# Patient Record
Sex: FEMALE | Race: OTHER | Employment: UNEMPLOYED | URBAN - METROPOLITAN AREA
[De-identification: names, ages, dates, MRNs, and addresses within clinical notes are randomized per-mention and may not be internally consistent; named-entity substitution may affect disease eponyms.]

---

## 2022-06-06 ENCOUNTER — OFFICE VISIT (OUTPATIENT)
Dept: ORTHOPEDIC SURGERY | Age: 37
End: 2022-06-06
Payer: COMMERCIAL

## 2022-06-06 VITALS — BODY MASS INDEX: 26.06 KG/M2 | WEIGHT: 166 LBS | HEIGHT: 67 IN

## 2022-06-06 DIAGNOSIS — S46.011A TRAUMATIC COMPLETE TEAR OF RIGHT ROTATOR CUFF, INITIAL ENCOUNTER: Primary | ICD-10-CM

## 2022-06-06 DIAGNOSIS — M19.011 DEGENERATIVE JOINT DISEASE OF RIGHT ACROMIOCLAVICULAR JOINT: ICD-10-CM

## 2022-06-06 DIAGNOSIS — S46.111A STRAIN OF MUSCLE, FASCIA AND TENDON OF LONG HEAD OF BICEPS, RIGHT ARM, INITIAL ENCOUNTER: ICD-10-CM

## 2022-06-06 PROCEDURE — 99204 OFFICE O/P NEW MOD 45 MIN: CPT | Performed by: ORTHOPAEDIC SURGERY

## 2022-06-06 RX ORDER — ALPRAZOLAM 0.5 MG/1
0.5 TABLET ORAL NIGHTLY PRN
COMMUNITY

## 2022-06-06 RX ORDER — ORPHENADRINE CITRATE 100 MG/1
100 TABLET, EXTENDED RELEASE ORAL 2 TIMES DAILY
COMMUNITY

## 2022-06-06 RX ORDER — OMEPRAZOLE 40 MG/1
40 CAPSULE, DELAYED RELEASE ORAL DAILY
COMMUNITY
Start: 2022-03-24
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
## 2022-06-07 PROBLEM — S46.011A TRAUMATIC COMPLETE TEAR OF RIGHT ROTATOR CUFF: Status: ACTIVE | Noted: 2022-06-07

## 2022-06-07 PROBLEM — M19.011 DEGENERATIVE JOINT DISEASE OF RIGHT ACROMIOCLAVICULAR JOINT: Status: ACTIVE | Noted: 2022-06-07

## 2022-06-07 PROBLEM — S46.111A STRAIN OF MUSCLE, FASCIA AND TENDON OF LONG HEAD OF BICEPS, RIGHT ARM, INITIAL ENCOUNTER: Status: ACTIVE | Noted: 2022-06-07

## 2022-06-10 ENCOUNTER — TELEPHONE (OUTPATIENT)
Dept: ORTHOPEDIC SURGERY | Age: 37
End: 2022-06-10

## 2022-06-10 NOTE — TELEPHONE ENCOUNTER
Patient calling to say was told surgery was 6/17 however hospital is telling her 16th and she can't do that day

## 2022-06-14 RX ORDER — LISDEXAMFETAMINE DIMESYLATE 10 MG/1
CAPSULE ORAL DAILY
COMMUNITY

## 2022-06-14 RX ORDER — NAPROXEN 500 MG/1
500 TABLET ORAL 2 TIMES DAILY WITH MEALS
COMMUNITY

## 2022-06-14 RX ORDER — HYDROXYZINE HYDROCHLORIDE 25 MG/1
TABLET, FILM COATED ORAL NIGHTLY
COMMUNITY
Start: 2022-06-02

## 2022-06-14 RX ORDER — ONDANSETRON 4 MG/1
4 TABLET, ORALLY DISINTEGRATING ORAL NIGHTLY
COMMUNITY

## 2022-06-14 RX ORDER — BUTALBITAL AND ACETAMINOPHEN 25; 325 MG/1; MG/1
TABLET ORAL AS NEEDED
COMMUNITY

## 2022-06-14 RX ORDER — FLUTICASONE PROPIONATE 50 MCG
SPRAY, SUSPENSION (ML) NASAL AS NEEDED
COMMUNITY
Start: 2021-10-28

## 2022-06-14 NOTE — PROGRESS NOTES
Patient verified name and . Order for consent  found in EHR and matches case posting; patient verifies procedure. Type 1b surgery, phone assessment complete. Orders  received. Labs per surgeon: none  Labs per anesthesia protocol: none    Patient answered medical/surgical history questions at their best of ability. All prior to admission medications documented in [facility]. Patient instructed to take the following medications the day of surgery according to anesthesia guidelines with a small sip of water: use Flonase nasal spray, if needed; take:  Omeprazole, Norflex, Gabapentin On the day before surgery please take Acetaminophen 1000mg in the morning and then again before bed. You may substitute for Tylenol 650 mg. Hold all vitamins 7 days prior to surgery and NSAIDS 5 days prior to surgery. Prescription meds to hold :  Naproxen hold for 5 days prior to surgery  Patient instructed on the following:    > Arrive at A Entrance, time of arrival to be called the day before by 1700  > NPO after midnight including gum, mints, and ice chips  > Responsible adult must drive patient to the hospital, stay during surgery, and patient will need supervision 24 hours after anesthesia  > Use antibacterial soap in shower the night before surgery and on the morning of surgery  > All piercings must be removed prior to arrival.    > Leave all valuables (money and jewelry) at home but bring insurance card and ID on DOS.   > You may be required to pay a deductible or co-pay on the day of your procedure. You can pre-pay by calling [phone] if your surgery is at the [facility] or [phone] if your surgery is at the [facility]. > Do not wear make-up, nail polish, lotions, cologne, perfumes, powders, or oil on skin. Artificial nails are not permitted.
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
## 2022-06-17 ENCOUNTER — PREP FOR PROCEDURE (OUTPATIENT)
Dept: ORTHOPEDIC SURGERY | Age: 37
End: 2022-06-17

## 2022-06-17 ENCOUNTER — APPOINTMENT (OUTPATIENT)
Dept: GENERAL RADIOLOGY | Age: 37
End: 2022-06-17
Attending: ORTHOPAEDIC SURGERY
Payer: COMMERCIAL

## 2022-06-17 ENCOUNTER — HOSPITAL ENCOUNTER (OUTPATIENT)
Age: 37
Setting detail: OUTPATIENT SURGERY
Discharge: HOME | End: 2022-06-17
Attending: ORTHOPAEDIC SURGERY | Admitting: ORTHOPAEDIC SURGERY
Payer: COMMERCIAL

## 2022-06-17 ENCOUNTER — ANESTHESIA (OUTPATIENT)
Dept: SURGERY | Age: 37
End: 2022-06-17
Payer: COMMERCIAL

## 2022-06-17 ENCOUNTER — ANESTHESIA EVENT (OUTPATIENT)
Dept: SURGERY | Age: 37
End: 2022-06-17
Payer: COMMERCIAL

## 2022-06-17 VITALS
WEIGHT: 164 LBS | BODY MASS INDEX: 25.74 KG/M2 | HEIGHT: 67 IN | OXYGEN SATURATION: 94 % | SYSTOLIC BLOOD PRESSURE: 119 MMHG | DIASTOLIC BLOOD PRESSURE: 68 MMHG | RESPIRATION RATE: 18 BRPM | HEART RATE: 75 BPM | TEMPERATURE: 97.8 F

## 2022-06-17 DIAGNOSIS — S46.011D TRAUMATIC COMPLETE TEAR OF RIGHT ROTATOR CUFF, SUBSEQUENT ENCOUNTER: Primary | ICD-10-CM

## 2022-06-17 DIAGNOSIS — S46.111A STRAIN OF MUSCLE, FASCIA AND TENDON OF LONG HEAD OF BICEPS, RIGHT ARM, INITIAL ENCOUNTER: ICD-10-CM

## 2022-06-17 DIAGNOSIS — S46.011A TRAUMATIC COMPLETE TEAR OF RIGHT ROTATOR CUFF, INITIAL ENCOUNTER: Primary | ICD-10-CM

## 2022-06-17 DIAGNOSIS — M19.011 DEGENERATIVE JOINT DISEASE OF RIGHT ACROMIOCLAVICULAR JOINT: ICD-10-CM

## 2022-06-17 PROBLEM — S43.431A SUPERIOR GLENOID LABRUM LESION OF RIGHT SHOULDER: Status: ACTIVE | Noted: 2022-06-17

## 2022-06-17 LAB
EST. AVERAGE GLUCOSE BLD GHB EST-MCNC: 108 MG/DL
GLUCOSE BLD STRIP.AUTO-MCNC: 100 MG/DL (ref 65–100)
HBA1C MFR BLD: 5.4 % (ref 4.2–6.3)
HCG UR QL: NEGATIVE
SERVICE CMNT-IMP: NORMAL

## 2022-06-17 PROCEDURE — 7100000000 HC PACU RECOVERY - FIRST 15 MIN: Performed by: ORTHOPAEDIC SURGERY

## 2022-06-17 PROCEDURE — 23412 REPAIR ROTATOR CUFF CHRONIC: CPT | Performed by: ORTHOPAEDIC SURGERY

## 2022-06-17 PROCEDURE — 29823 SHO ARTHRS SRG XTNSV DBRDMT: CPT | Performed by: ORTHOPAEDIC SURGERY

## 2022-06-17 PROCEDURE — 83036 HEMOGLOBIN GLYCOSYLATED A1C: CPT

## 2022-06-17 PROCEDURE — 82962 GLUCOSE BLOOD TEST: CPT

## 2022-06-17 PROCEDURE — 3700000000 HC ANESTHESIA ATTENDED CARE: Performed by: ORTHOPAEDIC SURGERY

## 2022-06-17 PROCEDURE — 2500000003 HC RX 250 WO HCPCS: Performed by: ORTHOPAEDIC SURGERY

## 2022-06-17 PROCEDURE — 3600000014 HC SURGERY LEVEL 4 ADDTL 15MIN: Performed by: ORTHOPAEDIC SURGERY

## 2022-06-17 PROCEDURE — 7100000001 HC PACU RECOVERY - ADDTL 15 MIN: Performed by: ORTHOPAEDIC SURGERY

## 2022-06-17 PROCEDURE — 3600000004 HC SURGERY LEVEL 4 BASE: Performed by: ORTHOPAEDIC SURGERY

## 2022-06-17 PROCEDURE — 2709999900 HC NON-CHARGEABLE SUPPLY: Performed by: ORTHOPAEDIC SURGERY

## 2022-06-17 PROCEDURE — 2500000003 HC RX 250 WO HCPCS: Performed by: NURSE ANESTHETIST, CERTIFIED REGISTERED

## 2022-06-17 PROCEDURE — 2580000003 HC RX 258: Performed by: ANESTHESIOLOGY

## 2022-06-17 PROCEDURE — 3700000001 HC ADD 15 MINUTES (ANESTHESIA): Performed by: ORTHOPAEDIC SURGERY

## 2022-06-17 PROCEDURE — 23430 REPAIR BICEPS TENDON: CPT | Performed by: ORTHOPAEDIC SURGERY

## 2022-06-17 PROCEDURE — 6360000002 HC RX W HCPCS: Performed by: NURSE ANESTHETIST, CERTIFIED REGISTERED

## 2022-06-17 PROCEDURE — 6360000002 HC RX W HCPCS: Performed by: ORTHOPAEDIC SURGERY

## 2022-06-17 PROCEDURE — 29824 SHO ARTHRS SRG DSTL CLAVICLC: CPT | Performed by: ORTHOPAEDIC SURGERY

## 2022-06-17 PROCEDURE — 81025 URINE PREGNANCY TEST: CPT

## 2022-06-17 PROCEDURE — C1713 ANCHOR/SCREW BN/BN,TIS/BN: HCPCS | Performed by: ORTHOPAEDIC SURGERY

## 2022-06-17 PROCEDURE — 6360000002 HC RX W HCPCS: Performed by: ANESTHESIOLOGY

## 2022-06-17 PROCEDURE — 7100000010 HC PHASE II RECOVERY - FIRST 15 MIN: Performed by: ORTHOPAEDIC SURGERY

## 2022-06-17 PROCEDURE — 6370000000 HC RX 637 (ALT 250 FOR IP): Performed by: ANESTHESIOLOGY

## 2022-06-17 PROCEDURE — 64415 NJX AA&/STRD BRCH PLXS IMG: CPT | Performed by: ANESTHESIOLOGY

## 2022-06-17 PROCEDURE — 73030 X-RAY EXAM OF SHOULDER: CPT

## 2022-06-17 DEVICE — OMEGA 4.75MM PEEK KNOTLESS ANCHOR SYSTEM, SINGLE
Type: IMPLANTABLE DEVICE | Site: SHOULDER | Status: FUNCTIONAL
Brand: OMEGA

## 2022-06-17 DEVICE — ICONIX 2 NEEDLES WITH INTELLIBRAID TECHNOLOGY, 2.3MM ANCHOR WITH 2 STRANDS #2 FORCE FIBER
Type: IMPLANTABLE DEVICE | Site: SHOULDER | Status: FUNCTIONAL
Brand: ICONIX

## 2022-06-17 DEVICE — 5.5MM PEEK ZIP SUTURE ANCHOR WITH ¿ CIRCLE TAPER NEEDLES, #2 FORCE FIBER
Type: IMPLANTABLE DEVICE | Site: SHOULDER | Status: FUNCTIONAL
Brand: PEEK ZIP

## 2022-06-17 RX ORDER — SODIUM CHLORIDE 9 MG/ML
INJECTION, SOLUTION INTRAVENOUS PRN
Status: DISCONTINUED | OUTPATIENT
Start: 2022-06-17 | End: 2022-06-17

## 2022-06-17 RX ORDER — HYDROMORPHONE HYDROCHLORIDE 2 MG/1
2 TABLET ORAL EVERY 6 HOURS PRN
Qty: 40 TABLET | Refills: 0 | Status: SHIPPED | OUTPATIENT
Start: 2022-06-17 | End: 2022-06-27

## 2022-06-17 RX ORDER — SODIUM CHLORIDE 0.9 % (FLUSH) 0.9 %
5-40 SYRINGE (ML) INJECTION PRN
Status: DISCONTINUED | OUTPATIENT
Start: 2022-06-17 | End: 2022-06-17 | Stop reason: HOSPADM

## 2022-06-17 RX ORDER — KETOROLAC TROMETHAMINE 10 MG/1
TABLET, FILM COATED ORAL
Qty: 20 TABLET | Refills: 0 | Status: SHIPPED | OUTPATIENT
Start: 2022-06-17

## 2022-06-17 RX ORDER — EPHEDRINE SULFATE/0.9% NACL/PF 50 MG/5 ML
SYRINGE (ML) INTRAVENOUS PRN
Status: DISCONTINUED | OUTPATIENT
Start: 2022-06-17 | End: 2022-06-17 | Stop reason: SDUPTHER

## 2022-06-17 RX ORDER — LIDOCAINE HYDROCHLORIDE AND EPINEPHRINE 10; 10 MG/ML; UG/ML
INJECTION, SOLUTION INFILTRATION; PERINEURAL PRN
Status: DISCONTINUED | OUTPATIENT
Start: 2022-06-17 | End: 2022-06-17 | Stop reason: ALTCHOICE

## 2022-06-17 RX ORDER — ROPIVACAINE HYDROCHLORIDE 5 MG/ML
INJECTION, SOLUTION EPIDURAL; INFILTRATION; PERINEURAL
Status: COMPLETED | OUTPATIENT
Start: 2022-06-17 | End: 2022-06-17

## 2022-06-17 RX ORDER — MIDAZOLAM HYDROCHLORIDE 2 MG/2ML
2 INJECTION, SOLUTION INTRAMUSCULAR; INTRAVENOUS
Status: COMPLETED | OUTPATIENT
Start: 2022-06-17 | End: 2022-06-17

## 2022-06-17 RX ORDER — SODIUM CHLORIDE 9 MG/ML
INJECTION, SOLUTION INTRAVENOUS PRN
Status: DISCONTINUED | OUTPATIENT
Start: 2022-06-17 | End: 2022-06-17 | Stop reason: HOSPADM

## 2022-06-17 RX ORDER — LIDOCAINE HYDROCHLORIDE 20 MG/ML
INJECTION, SOLUTION EPIDURAL; INFILTRATION; INTRACAUDAL; PERINEURAL PRN
Status: DISCONTINUED | OUTPATIENT
Start: 2022-06-17 | End: 2022-06-17 | Stop reason: SDUPTHER

## 2022-06-17 RX ORDER — NEOSTIGMINE METHYLSULFATE 1 MG/ML
INJECTION, SOLUTION INTRAVENOUS PRN
Status: DISCONTINUED | OUTPATIENT
Start: 2022-06-17 | End: 2022-06-17 | Stop reason: SDUPTHER

## 2022-06-17 RX ORDER — DEXAMETHASONE SODIUM PHOSPHATE 10 MG/ML
INJECTION INTRAMUSCULAR; INTRAVENOUS PRN
Status: DISCONTINUED | OUTPATIENT
Start: 2022-06-17 | End: 2022-06-17 | Stop reason: SDUPTHER

## 2022-06-17 RX ORDER — FENTANYL CITRATE 50 UG/ML
100 INJECTION, SOLUTION INTRAMUSCULAR; INTRAVENOUS
Status: COMPLETED | OUTPATIENT
Start: 2022-06-17 | End: 2022-06-17

## 2022-06-17 RX ORDER — HYDROMORPHONE HYDROCHLORIDE 1 MG/ML
0.5 INJECTION, SOLUTION INTRAMUSCULAR; INTRAVENOUS; SUBCUTANEOUS EVERY 5 MIN PRN
Status: DISCONTINUED | OUTPATIENT
Start: 2022-06-17 | End: 2022-06-17 | Stop reason: HOSPADM

## 2022-06-17 RX ORDER — SODIUM CHLORIDE 0.9 % (FLUSH) 0.9 %
5-40 SYRINGE (ML) INJECTION PRN
Status: CANCELLED | OUTPATIENT
Start: 2022-06-17

## 2022-06-17 RX ORDER — ONDANSETRON 2 MG/ML
INJECTION INTRAMUSCULAR; INTRAVENOUS PRN
Status: DISCONTINUED | OUTPATIENT
Start: 2022-06-17 | End: 2022-06-17 | Stop reason: SDUPTHER

## 2022-06-17 RX ORDER — SODIUM CHLORIDE, SODIUM LACTATE, POTASSIUM CHLORIDE, CALCIUM CHLORIDE 600; 310; 30; 20 MG/100ML; MG/100ML; MG/100ML; MG/100ML
INJECTION, SOLUTION INTRAVENOUS CONTINUOUS
Status: DISCONTINUED | OUTPATIENT
Start: 2022-06-17 | End: 2022-06-17 | Stop reason: HOSPADM

## 2022-06-17 RX ORDER — SODIUM CHLORIDE 0.9 % (FLUSH) 0.9 %
5-40 SYRINGE (ML) INJECTION EVERY 12 HOURS SCHEDULED
Status: DISCONTINUED | OUTPATIENT
Start: 2022-06-17 | End: 2022-06-17

## 2022-06-17 RX ORDER — ACETAMINOPHEN 500 MG
1000 TABLET ORAL ONCE
Status: COMPLETED | OUTPATIENT
Start: 2022-06-17 | End: 2022-06-17

## 2022-06-17 RX ORDER — SODIUM CHLORIDE 0.9 % (FLUSH) 0.9 %
5-40 SYRINGE (ML) INJECTION EVERY 12 HOURS SCHEDULED
Status: CANCELLED | OUTPATIENT
Start: 2022-06-17

## 2022-06-17 RX ORDER — PROCHLORPERAZINE EDISYLATE 5 MG/ML
5 INJECTION INTRAMUSCULAR; INTRAVENOUS
Status: DISCONTINUED | OUTPATIENT
Start: 2022-06-17 | End: 2022-06-17 | Stop reason: HOSPADM

## 2022-06-17 RX ORDER — OXYCODONE HYDROCHLORIDE 5 MG/1
5 TABLET ORAL
Status: DISCONTINUED | OUTPATIENT
Start: 2022-06-17 | End: 2022-06-17 | Stop reason: HOSPADM

## 2022-06-17 RX ORDER — SODIUM CHLORIDE 0.9 % (FLUSH) 0.9 %
5-40 SYRINGE (ML) INJECTION EVERY 12 HOURS SCHEDULED
Status: DISCONTINUED | OUTPATIENT
Start: 2022-06-17 | End: 2022-06-17 | Stop reason: HOSPADM

## 2022-06-17 RX ORDER — IPRATROPIUM BROMIDE AND ALBUTEROL SULFATE 2.5; .5 MG/3ML; MG/3ML
1 SOLUTION RESPIRATORY (INHALATION)
Status: DISCONTINUED | OUTPATIENT
Start: 2022-06-17 | End: 2022-06-17 | Stop reason: HOSPADM

## 2022-06-17 RX ORDER — SODIUM CHLORIDE 9 MG/ML
INJECTION, SOLUTION INTRAVENOUS PRN
Status: CANCELLED | OUTPATIENT
Start: 2022-06-17

## 2022-06-17 RX ORDER — PROPOFOL 10 MG/ML
INJECTION, EMULSION INTRAVENOUS PRN
Status: DISCONTINUED | OUTPATIENT
Start: 2022-06-17 | End: 2022-06-17 | Stop reason: SDUPTHER

## 2022-06-17 RX ORDER — HALOPERIDOL 5 MG/ML
1 INJECTION INTRAMUSCULAR
Status: DISCONTINUED | OUTPATIENT
Start: 2022-06-17 | End: 2022-06-17 | Stop reason: HOSPADM

## 2022-06-17 RX ORDER — GLYCOPYRROLATE 0.2 MG/ML
INJECTION INTRAMUSCULAR; INTRAVENOUS PRN
Status: DISCONTINUED | OUTPATIENT
Start: 2022-06-17 | End: 2022-06-17 | Stop reason: SDUPTHER

## 2022-06-17 RX ORDER — SODIUM CHLORIDE 0.9 % (FLUSH) 0.9 %
5-40 SYRINGE (ML) INJECTION PRN
Status: DISCONTINUED | OUTPATIENT
Start: 2022-06-17 | End: 2022-06-17

## 2022-06-17 RX ORDER — PROMETHAZINE HYDROCHLORIDE 25 MG/1
25 TABLET ORAL EVERY 6 HOURS PRN
Qty: 20 TABLET | Refills: 0 | Status: SHIPPED | OUTPATIENT
Start: 2022-06-17 | End: 2022-06-22

## 2022-06-17 RX ORDER — SCOLOPAMINE TRANSDERMAL SYSTEM 1 MG/1
1 PATCH, EXTENDED RELEASE TRANSDERMAL ONCE
Status: CANCELLED | OUTPATIENT
Start: 2022-06-17

## 2022-06-17 RX ORDER — ROCURONIUM BROMIDE 10 MG/ML
INJECTION, SOLUTION INTRAVENOUS PRN
Status: DISCONTINUED | OUTPATIENT
Start: 2022-06-17 | End: 2022-06-17 | Stop reason: SDUPTHER

## 2022-06-17 RX ADMIN — SODIUM CHLORIDE, SODIUM LACTATE, POTASSIUM CHLORIDE, AND CALCIUM CHLORIDE: 600; 310; 30; 20 INJECTION, SOLUTION INTRAVENOUS at 07:45

## 2022-06-17 RX ADMIN — PROPOFOL 150 MG: 10 INJECTION, EMULSION INTRAVENOUS at 09:07

## 2022-06-17 RX ADMIN — SODIUM CHLORIDE, SODIUM LACTATE, POTASSIUM CHLORIDE, AND CALCIUM CHLORIDE: 600; 310; 30; 20 INJECTION, SOLUTION INTRAVENOUS at 08:57

## 2022-06-17 RX ADMIN — ONDANSETRON 4 MG: 2 INJECTION INTRAMUSCULAR; INTRAVENOUS at 09:15

## 2022-06-17 RX ADMIN — MIDAZOLAM 2 MG: 1 INJECTION, SOLUTION INTRAMUSCULAR; INTRAVENOUS at 08:55

## 2022-06-17 RX ADMIN — Medication 3 MG: at 10:15

## 2022-06-17 RX ADMIN — ROCURONIUM BROMIDE 40 MG: 50 INJECTION, SOLUTION INTRAVENOUS at 09:07

## 2022-06-17 RX ADMIN — Medication 5 MG: at 09:21

## 2022-06-17 RX ADMIN — ACETAMINOPHEN 1000 MG: 500 TABLET, FILM COATED ORAL at 07:44

## 2022-06-17 RX ADMIN — DEXAMETHASONE SODIUM PHOSPHATE 8 MG: 10 INJECTION INTRAMUSCULAR; INTRAVENOUS at 09:15

## 2022-06-17 RX ADMIN — ROPIVACAINE HYDROCHLORIDE 30 ML: 5 INJECTION, SOLUTION EPIDURAL; INFILTRATION; PERINEURAL at 08:53

## 2022-06-17 RX ADMIN — ROCURONIUM BROMIDE 40 MG: 50 INJECTION, SOLUTION INTRAVENOUS at 09:08

## 2022-06-17 RX ADMIN — LIDOCAINE HYDROCHLORIDE 60 MG: 20 INJECTION, SOLUTION EPIDURAL; INFILTRATION; INTRACAUDAL; PERINEURAL at 09:07

## 2022-06-17 RX ADMIN — Medication 2 MG: at 10:16

## 2022-06-17 RX ADMIN — FENTANYL CITRATE 100 MCG: 50 INJECTION INTRAMUSCULAR; INTRAVENOUS at 08:55

## 2022-06-17 RX ADMIN — GLYCOPYRROLATE 0.2 MG: 0.2 INJECTION, SOLUTION INTRAMUSCULAR; INTRAVENOUS at 10:16

## 2022-06-17 RX ADMIN — Medication 2000 MG: at 09:05

## 2022-06-17 RX ADMIN — GLYCOPYRROLATE 0.4 MG: 0.2 INJECTION, SOLUTION INTRAMUSCULAR; INTRAVENOUS at 10:15

## 2022-06-17 ASSESSMENT — PAIN SCALES - GENERAL
PAINLEVEL_OUTOF10: 0
PAINLEVEL_OUTOF10: 0

## 2022-06-17 NOTE — ANESTHESIA PROCEDURE NOTES
Peripheral Block    Patient location during procedure: pre-op  Reason for block: post-op pain management and at surgeon's request  Start time: 6/17/2022 8:53 AM  End time: 6/17/2022 8:57 AM  Staffing  Performed: anesthesiologist   Anesthesiologist: Lord Antionette MD  Preanesthetic Checklist  Completed: patient identified, IV checked, site marked, risks and benefits discussed, surgical/procedural consents, equipment checked, pre-op evaluation, timeout performed, anesthesia consent given, oxygen available and monitors applied/VS acknowledged  Peripheral Block   Patient position: supine  Prep: ChloraPrep  Provider prep: mask  Patient monitoring: cardiac monitor, continuous pulse ox, frequent blood pressure checks, IV access and oxygen  Block type: Brachial plexus  Interscalene  Laterality: right  Injection technique: single-shot  Guidance: ultrasound guided  Local infiltration: decadron (4 mg adjuvant; and epi 150 mcg)  Local infiltration: decadron (4 mg adjuvant; and epi 150 mcg)    Needle   Needle type: insulated echogenic nerve stimulator needle   Needle gauge: 21 G  Needle localization: ultrasound guidance  Needle length: 10 cm  Assessment   Injection assessment: negative aspiration for heme, no paresthesia on injection, local visualized surrounding nerve on ultrasound and no intravascular symptoms  Slow fractionated injection: yes  Hemodynamics: stable  Real-time US image taken/store: yes  Outcomes: uncomplicated    Medications Administered  Ropivacaine (NAROPIN) injection 0.5%, 30 mL

## 2022-06-17 NOTE — ANESTHESIA POSTPROCEDURE EVALUATION
Department of Anesthesiology  Postprocedure Note    Patient: Marcello Randle  MRN: [id number]  YOB: 1985  Date of evaluation: 6/17/2022  Time:  10:46 AM     Procedure Summary     Date: 06/17/22 Room / Location: [facility] 06 / [facility]    Anesthesia Start: 0902 Anesthesia Stop: 1024    Procedure: ARTHROSCOPY RIGHT SHOULDER ARTHROSCOPIC SUBACROMIAL DECOMPRESSION, DISTAL CLAVICLE RESECTION, EXTENSIVE DEBRIDEMENT SLAP TEAR, BICEPS LABRAL COMPLEX, GLENOHUMERAL JOINT CAPSULE, SUBACROMIAL SPACE, MINI OPEN ROTATOR CUFF REPAIR, BICEPS TENODESIS (Right Shoulder) Diagnosis:       Traumatic complete tear of right rotator cuff, initial encounter      Strain of muscle, fascia and tendon of long head of biceps, right arm, initial encounter      Degenerative joint disease of right acromioclavicular joint      (Traumatic complete tear of right rotator cuff, initial encounter [S46.011A])    Surgeons: Mir Rosenbaum MD Responsible Provider: London Arce MD    Anesthesia Type: general ASA Status: 2          Anesthesia Type: No value filed. Arsenio Phase I: Arsenio Score: 8    Arsenio Phase II:      Last vitals: Reviewed and per EMR flowsheets.        Anesthesia Post Evaluation    Patient location during evaluation: PACU  Patient participation: complete - patient participated  Level of consciousness: awake  Airway patency: patent  Nausea & Vomiting: no nausea  Complications: no  Cardiovascular status: hemodynamically stable  Respiratory status: acceptable and nonlabored ventilation  Hydration status: stable  Multimodal analgesia pain management approach
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
## 2022-06-17 NOTE — H&P
Update History & Physical    The patient's History and Physical of June 6, 2022 was reviewed with the patient and I examined the patient. There was no change. The surgical site was confirmed by the patient and me. Plan: The risks, benefits, expected outcome, and alternative to the recommended procedure have been discussed with the patient. Patient understands and wants to proceed with the procedure.      Electronically signed by Isak Moore MD on 6/17/2022 at 7:04 AM

## 2022-06-17 NOTE — BRIEF OP NOTE
BRIEF OPERATIVE NOTE    Date of Procedure: 6/17/2022    Preoperative Diagnosis:  ROTATOR CUFF TEAR RIGHT SHOULDER      BICEPITAL STRAIN RIGHT SHOULDER      AC OA RIGHT SHOULDER    Postoperative Diagnosis:  SAME      SLAP TEAR RIGHT SHOULDER    Procedure(s):  ARTHROSCOPY RIGHT SHOULDER ARTHROSCOPIC SUBACROMIAL DECOMPRESSION, DISTAL CLAVICLE RESECTION, EXTENSIVE DEBRIDEMENT SLAP TEAR, BICEPS LABRAL COMPLEX, GLENOHUMERAL JOINT CAPSULE, SUBACROMIAL SPACE, MINI OPEN ROTATOR CUFF REPAIR, BICEPS TENODESIS    Surgeon(s) and Role:     * Chava Beckford MD - Primary         Assistant Staff:  NONE    Surgical Staff:  Circulator: Dhaval Maldonado RN  Surgical Assistant: Tiffanie Bonner  Scrub Person First: Mic Santana  Scrub Person Second: Hyacinth Varela      * Missing procedure start or end time(s) *    Anesthesia:  GENERAL WITH INTERSCALENE BLOCK    Estimated Blood Loss: 30 CC. Complications: NONE    Implants:   Implant Name Type Inv. Item Serial No.  Lot No. LRB No. Used Action   ANCHOR SUTURE SINGLE 4.75 MM PEEK KNOTLESS SYS OMEGA - [id number]  ANCHOR SUTURE SINGLE 4.75 MM PEEK KNOTLESS SYS OMEGA  [employer] [id number] Right 2 Implanted   ANCHOR SUT DIA5. 5MM PEEK ZIP W/ NDL - [id number]  ANCHOR SUT DIA5. 5MM PEEK ZIP W/ NDL  [employer] [id number] Right 4 Implanted   ANCHOR SUT DIA2.3MM W/ NDL 2 STRND NO2 FOR FBR - [id number]  ANCHOR SUT DIA2.3MM W/ NDL 2 STRND NO2 FORC FBR  [employer] [id number] Right 1 Implanted       Gloria Roberts MD

## 2022-06-23 ENCOUNTER — TELEPHONE (OUTPATIENT)
Dept: ORTHOPEDIC SURGERY | Age: 37
End: 2022-06-23

## 2022-06-23 NOTE — TELEPHONE ENCOUNTER
Her mom is calling regarding the surgery she had last Friday on her right shoulder. She would like a call back to discuss some questions. Pt out and pleasant on interaction with staff and peers  No irritability noted  Encouraged to wear own clothing  Attending groups

## 2022-06-23 NOTE — TELEPHONE ENCOUNTER
Harijt Leonard  1985  female     Patient is 6 days post op Right shoulder arthroscopy for RTC repair. History of DVT. Reports swelling bilateral lower extremities with leg pain. Advised to go to ED to have doppler ultrasound to eval for DVT. Neva Hurley PA-C  06/23/22      Elements of this note were created using speech recognition software. As such, errors of speech recognition may be present.

## 2022-06-24 NOTE — TELEPHONE ENCOUNTER
Called and spoke with pt who states that she went to ER in [facility] last night where they performed blood work. She was told that swelling was likely from fluid retention due to sodium levels and was sent home with Lasix for 5 days and told to follow up with her primary care. Pt states she has not picked up the Lasix yet. After speaking with LBF, I advised pt to  Lasix and begin taking it as prescribed. I advised her to call her PCP to set up a follow up appt. I also advised her to closely watch the swelling in her legs and if it seems to get worse or has not gotten better with Lasix to go back to ER for further evaluation. Pt agreed and will update us if anything happens.

## 2022-06-28 ENCOUNTER — OFFICE VISIT (OUTPATIENT)
Dept: ORTHOPEDIC SURGERY | Age: 37
End: 2022-06-28

## 2022-06-28 DIAGNOSIS — Z48.89 ENCOUNTER FOR POSTOPERATIVE CARE: Primary | ICD-10-CM

## 2022-06-28 DIAGNOSIS — S46.011A TRAUMATIC COMPLETE TEAR OF RIGHT ROTATOR CUFF, INITIAL ENCOUNTER: Primary | ICD-10-CM

## 2022-06-28 RX ORDER — HYDROMORPHONE HYDROCHLORIDE 2 MG/1
2 TABLET ORAL EVERY 6 HOURS PRN
Qty: 20 TABLET | Refills: 0 | Status: SHIPPED | OUTPATIENT
Start: 2022-06-28 | End: 2022-07-03

## 2022-06-28 NOTE — PROGRESS NOTES
Today: 22    Name: Harjit Leonard  : 1985  MRN: [id number]    Patient comes in today for her post-op appointment. She is 11 days status post arthroscopy of right shoulder, arthroscopic subacromial decompression, arthroscopic distal clavicle resection, extensive debridement of SLAP tear, biceps labral complex, glenohumeral joint, subacromial space, mini open rotator cuff repair and biceps tenodesis. Bandage was removed from the right shoulder, incisions healing nicely. Sutures were removed, incisions intact. Patient did feel a little nauseous after sutures were removed. Patient laid down, ice water given. After a few minutes patient felt better and was sitting upright. Patient was given post op wound instructions. Patient stated the sling has been a security blanket for her, encouraged her to be out of the sling as much as possible at home and to wear the sling when out in the community. Yamile Aguirre NP will send in a refill of Dilaudid for the patient. Patient will get started in outpatient physical therapy working on passive motion per Dr. Meena Fried protocol. She will return in 2 weeks. Patient instructed to call our office with any additional questions or needs. Patient was only seen by Meenakshi Lovelace CST to maintain post operative protocol, all orders for this visit were received verbally by Dr. Stanley Rachel prior to appointment.

## 2022-06-28 NOTE — LETTER
6/28/2022     Name: Marino Szymanski    Patient is unable to work until next appointment    Work Restrictions include:     Duration: 2 weeks    Ashok Cartagena.  Jesu Go MD        Electronically Signed

## 2022-06-29 ENCOUNTER — HOSPITAL ENCOUNTER (OUTPATIENT)
Dept: PHYSICAL THERAPY | Age: 37
Setting detail: RECURRING SERIES
Discharge: HOME | End: 2022-07-02
Payer: COMMERCIAL

## 2022-06-29 PROCEDURE — 97110 THERAPEUTIC EXERCISES: CPT

## 2022-06-29 PROCEDURE — 97162 PT EVAL MOD COMPLEX 30 MIN: CPT

## 2022-06-29 ASSESSMENT — PAIN SCALES - GENERAL: PAINLEVEL_OUTOF10: 6

## 2022-06-29 NOTE — PROGRESS NOTES
Em Patteneger  : 1985  Primary: Austin Aneta Allison  Secondary:  [address] [address]  Phone: [phone]  Fax: [phone] Plan Frequency: 2x/week (x2 weeks initially with plan to follow 12 weeks)    Plan of Care/Certification Expiration Date: 22      PT Visit Info: Total # of Visits to Date: 1      OUTPATIENT PHYSICAL THERAPY:OP NOTE TYPE: Treatment Note 2022       Episode  }Appt Desk       Treatment Diagnosis:  Pain in Right Shoulder (M25.511)  Stiffness of Right Shoulder, Not elsewhere classified (M25.611)   Strain of muscle(s) and tendon(s) of the rotator cuff of right shoulder, sequela (S46.011S)  Medical/Referring Diagnosis: s/p  Arthroscopy of right shoulder, arthroscopic subacromial decompression, arthroscopic distal clavicle resection, extensive debridement of SLAP tear, biceps labral complex, glenohumeral joint, subacromial space, mini-open rotator cuff repair and biceps tenodesis. Strain of muscle(s) and tendon(s) of the rotator cuff of right shoulder, initial encounter [S46.011A]  Referring Physician:  Elizabeth Zazueta MD MD Orders:  Eval & treat, home exercise program and passive motion; 2x/week x2 weeks. (22)  Date of Onset:  Onset Date: 22 (surgery; 22 injury)  Allergies:   Duloxetine and Erythromycin  Restrictions/Precautions: Post op restrictions and precautions R shoulder. Interventions Planned (Treatment may consist of any combination of the following):    Current Treatment Recommendations: Strengthening; ROM; Manual Therapy - Soft Tissue Mobilization; Manual Therapy - Joint Manipulation; Pain management; Home exercise program; Modalities     Subjective Comments:    Initial:}    6/10  Post Session:        /10  Medications Last Reviewed:  2022  Updated Objective Findings:    See evaluation note from today  Treatment   THERAPEUTIC EXERCISE: (15 minutes):  PROM R shoulder while in supine
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
## 2022-07-01 ENCOUNTER — HOSPITAL ENCOUNTER (OUTPATIENT)
Dept: PHYSICAL THERAPY | Age: 37
Setting detail: RECURRING SERIES
Discharge: HOME | End: 2022-07-04

## 2022-07-01 PROCEDURE — 97110 THERAPEUTIC EXERCISES: CPT

## 2022-07-01 ASSESSMENT — PAIN SCALES - GENERAL: PAINLEVEL_OUTOF10: 5

## 2022-07-01 NOTE — PROGRESS NOTES
Stephie Barrientos  : 1985  Primary: Pelayo Omar Allison  Secondary:  [address] @ [address] [address]  Phone: [phone]  Fax: [phone] Plan Frequency: 2x/week (x2 weeks initially with plan to follow 12 weeks)    Plan of Care/Certification Expiration Date: 22      PT Visit Info: Total # of Visits to Date: 2      OUTPATIENT PHYSICAL THERAPY:OP NOTE TYPE: Treatment Note 2022       Episode  }Appt Desk       Treatment Diagnosis:  Pain in Right Shoulder (M25.511)  Stiffness of Right Shoulder, Not elsewhere classified (M25.611)   Strain of muscle(s) and tendon(s) of the rotator cuff of right shoulder, sequela (S46.011S)  Medical/Referring Diagnosis: s/p  Arthroscopy of right shoulder, arthroscopic subacromial decompression, arthroscopic distal clavicle resection, extensive debridement of SLAP tear, biceps labral complex, glenohumeral joint, subacromial space, mini-open rotator cuff repair and biceps tenodesis. Strain of muscle(s) and tendon(s) of the rotator cuff of right shoulder, initial encounter [S46.011A]  Referring Physician:  Dustin Ceballos MD MD Orders:  Eval & treat, home exercise program and passive motion; 2x/week x2 weeks. (22)  Date of Onset:  Onset Date: 22 (surgery; 22 injury)  Allergies:   Duloxetine and Erythromycin  Restrictions/Precautions: Post op restrictions and precautions R shoulder. Interventions Planned (Treatment may consist of any combination of the following):    Current Treatment Recommendations: Strengthening; ROM; Manual Therapy - Soft Tissue Mobilization; Manual Therapy - Joint Manipulation; Pain management; Home exercise program; Modalities     Subjective Comments: Pt states \"The shoulder is tight. \"  Initial:}    5/10  Post Session:        /10, no increase  Medications Last Reviewed:  2022  Updated Objective Findings:    Pt is tight and guarded during ambulation.  She is wearing the sling but states that she is weaning from the sling. Treatment   THERAPEUTIC EXERCISE: (35 minutes): PROM R shoulder while in supine for motion within protocol limits. The scapula was stabilized for all motions and pt practice diaphramatic breathing during PROM. ER, scaption and flexion to protocol limits. Pendulums x10 each  Scapula retraction in sitting x20  Chin tucks x10 seated    MODALITIES: (15 minutes, unbilable): Cold and compression for post treatment soreness to R shoulder using GameReady, low pressure, 40-deg F, x10' while in sitting UE supported. Good relief with this. HEP: Continue current HEP. Treatment/Session Summary:    · Treatment Assessment: Pt has limited ER passively. She has scaption and flexion to protocol limits but she is tight and guarded. · Communication/Consultation:  None today  · Equipment provided today:  None  · Recommendations/Intent for next treatment session: We will continue with modalities for pain modulation, initial R shoulder PROM treatment. Will review and update HEP as appropriate.      Total Treatment Billable Duration:  35 minutes   Time In: 7222  Time Out: 1040    Heather Eye, PTA       Charge Capture  }Post Session Pain  PT Visit Info  [employer] Portal  MD Guidelines  Scanned Media  Benefits  MyChart    Future Appointments   Date Time Provider Naedr Jameson   7/5/2022  2:45 PM Laurine Hoit [place], PT [facility]   7/7/2022  1:00 PM Kurt Doshi, PT [facility]   7/11/2022 11:15 AM Heather Eye, PTA [facility]   7/11/2022  1:00 PM Kay Archer MD [facility]   7/13/2022  1:00 PM Kurt Doshi, PT [facility]   7/18/2022  2:30 PM Kurt Doshi, PT [facility]   7/21/2022  2:30 PM Heather Eye, PTA [facility]   7/25/2022  2:30 PM Heather Eye, PTA [facility]   7/28/2022  2:30 PM Heather Eye, PTA [facility]   8/1/2022  2:30 PM Kurt Doshi, PT [facility]

## 2022-07-05 ENCOUNTER — HOSPITAL ENCOUNTER (OUTPATIENT)
Dept: PHYSICAL THERAPY | Age: 37
Setting detail: RECURRING SERIES
Discharge: HOME | End: 2022-07-08

## 2022-07-05 PROCEDURE — 97110 THERAPEUTIC EXERCISES: CPT

## 2022-07-05 PROCEDURE — 97140 MANUAL THERAPY 1/> REGIONS: CPT

## 2022-07-05 NOTE — PROGRESS NOTES
Tish July  : 1985  Primary: Floyd Allison  Secondary:  Rosario Beltre @ [address] [address]  Phone: [phone]  Fax: [phone] Plan Frequency: 2x/week (x2 weeks initially with plan to follow 12 weeks)    Plan of Care/Certification Expiration Date: 22      PT Visit Info: Total # of Visits to Date: 3      OUTPATIENT PHYSICAL THERAPY:OP NOTE TYPE: Treatment Note 2022       Episode  }Appt Desk       Treatment Diagnosis:  Pain in Right Shoulder (M25.511)  Stiffness of Right Shoulder, Not elsewhere classified (M25.611)   Strain of muscle(s) and tendon(s) of the rotator cuff of right shoulder, sequela (S46.011S)  Medical/Referring Diagnosis: s/p  Arthroscopy of right shoulder, arthroscopic subacromial decompression, arthroscopic distal clavicle resection, extensive debridement of SLAP tear, biceps labral complex, glenohumeral joint, subacromial space, mini-open rotator cuff repair and biceps tenodesis. Strain of muscle(s) and tendon(s) of the rotator cuff of right shoulder, initial encounter [S46.011A]  Referring Physician:  Tres Acuña MD MD Orders:  Eval & treat, home exercise program and passive motion; 2x/week x2 weeks. (22)  Date of Onset:  Onset Date: 22 (surgery; 22 injury)  Allergies:   Duloxetine and Erythromycin  Restrictions/Precautions: Post op restrictions and precautions R shoulder. Interventions Planned (Treatment may consist of any combination of the following):    Current Treatment Recommendations: Strengthening; ROM; Manual Therapy - Soft Tissue Mobilization; Manual Therapy - Joint Manipulation; Pain management; Home exercise program; Modalities     Subjective Comments: Continues have pain, but managing with her meds and ice. Has been doing the HEP and has been getting out of the sling more through day. Initial:}     No VAS.    Post Session:        /  Medications Last Reviewed: 7/5/2022  Updated Objective Findings:   ROM:  PROM RUE (degrees)  R Shoulder Flex  0-180: 90 (forward elevation)  R Shoulder ABduction 0-180: 85 (with scapula stabilized)  R Shoulder Ext Rotation  0-90: 15 (in shoulder neutral and 45 deg abd)  Treatment   MANUAL THERAPY: (15 minutes): Positional Release technique to R upper trap, pec, subscapularis, and pronator teres for muscle restrictions to motion. Grade 1-2 physiologic mobilizations to R glenohumeral distraction and long axis traction for pain modulation,   THERAPEUTIC EXERCISE: (30 minutes): Exercise for R shoulder motion with PROM x10 each and gentle physiologic shoulder motions with hold/relax stretch 30\"x3 in supine to ER in shoulder neutral and 45 deg abd/scaption, abduction, IR stabilized at 30 and 45 deg abd/scaption, and forward elevation; assisted active stretching to elbow flexors, wrist flexors and extensors, and forearm pronators, 3\"x10 each. Added prone arm hang with shoulder at 90-deg flexion over side of bed. Reviewed HEP, including supine wand ER in shoulder neutral. Verbal and manual cues for corrected alignment and movement. MODALITIES: (15 minutes, unbilable): Cold and compression for post treatment soreness to R shoulder using GameReady, low pressure, 40-deg F, x15' while in sitting UE supported. Good relief with this. HEP: Provided a written HEP at her request for the initial program already discussed. She is to continue with these. She verbalizes understanding. [employer] Portal  7/5/22: [id number]    Treatment/Session Summary:    · Treatment Assessment: She is making gains in her R shoulder PROM. Quite tender with active trigger points to the muscles addressed with the positional release. Pain dominant and stiff to the shoulder. Appears to be compliant with her HEP. Good treatment relief with the cold/compression post treatment.    · Communication/Consultation:  None today  · Equipment provided today: None  · Recommendations/Intent for next treatment session: We will continue with modalities for pain modulation, initial R shoulder PROM treatment. Will review and update HEP as appropriate.      Total Treatment Billable Duration:  45 minutes   Time In: 0093  Time Out: 2415 Rober Stearns, PT       Charge Capture  }Post Session Pain  PT Visit Info  [employer] Portal  MD Guidelines  Scanned Media  Benefits  MyChart    Future Appointments   Date Time Provider Nader Jameson   7/7/2022  1:[facility] [address]   7/11/2022 11:15 AM Roly Clifton, PTA [facility]   7/11/2022  1:00 PM Lyndsay Ferguson MD [facility]   7/13/2022  1:[facility] [address], PT [facility]   7/18/2022  2:30  [facility] [address], PT [facility]   7/21/2022  2:30 PM Roly Clifton, PTA [facility]   7/25/2022  2:30 PM Roly Clifton, PTA [facility]   7/28/2022  2:30 PM Roly Clifton, PTA [facility]   8/1/2022  2:30  [facility] [address], PT [facility]

## 2022-07-07 ENCOUNTER — HOSPITAL ENCOUNTER (OUTPATIENT)
Dept: PHYSICAL THERAPY | Age: 37
Setting detail: RECURRING SERIES
Discharge: HOME | End: 2022-07-10

## 2022-07-07 PROCEDURE — 97110 THERAPEUTIC EXERCISES: CPT

## 2022-07-07 PROCEDURE — 97140 MANUAL THERAPY 1/> REGIONS: CPT

## 2022-07-07 NOTE — PROGRESS NOTES
Thalia Gilbert  : 1985  Primary: Dom Cervantes Sc  Secondary:  [address] @ [address] [address]  Phone: [phone]  Fax: [phone] Plan Frequency: 2x/week (x2 weeks initially with plan to follow 12 weeks)    Plan of Care/Certification Expiration Date: 22      PT Visit Info: Total # of Visits to Date: 4      OUTPATIENT PHYSICAL THERAPY:OP NOTE TYPE: Treatment Note 2022       Episode  }Appt Desk       Treatment Diagnosis:  Pain in Right Shoulder (M25.511)  Stiffness of Right Shoulder, Not elsewhere classified (M25.611)   Strain of muscle(s) and tendon(s) of the rotator cuff of right shoulder, sequela (S46.011S)  Medical/Referring Diagnosis: s/p  Arthroscopy of right shoulder, arthroscopic subacromial decompression, arthroscopic distal clavicle resection, extensive debridement of SLAP tear, biceps labral complex, glenohumeral joint, subacromial space, mini-open rotator cuff repair and biceps tenodesis. Strain of muscle(s) and tendon(s) of the rotator cuff of right shoulder, initial encounter [S46.011A]  Referring Physician:  Adriana Macias MD MD Orders:  Eval & treat, home exercise program and passive motion; 2x/week x2 weeks. (22)  Date of Onset:  Onset Date: 22 (surgery; 22 injury)  Allergies:   Duloxetine and Erythromycin  Restrictions/Precautions: Post op restrictions and precautions R shoulder. Interventions Planned (Treatment may consist of any combination of the following):    Current Treatment Recommendations: Strengthening; ROM; Manual Therapy - Soft Tissue Mobilization; Manual Therapy - Joint Manipulation; Pain management; Home exercise program; Modalities     Subjective Comments: Shoulder is still painful, but doing better. Has been working on her HEP, and trying to use her R hand with ADL's while her arm is by her side. Initial:}     No VAS.    Post Session:          Medications Last Reviewed: 7/7/2022  Updated Objective Findings:  Not measured. Treatment   MANUAL THERAPY: (15 minutes): Positional Release technique to R upper trap, pec, subscapularis, infraspinatus, and pronator teres for muscle restrictions to motion. Grade 1-2 physiologic mobilizations to R glenohumeral distraction and long axis traction for pain modulation,   THERAPEUTIC EXERCISE: (25 minutes): Exercise for R shoulder motion with PROM x10 each and gentle physiologic shoulder motions with hold/relax stretch 30\"x3 in supine to ER in shoulder neutral and 45 deg abd/scaption, abduction, IR stabilized at 30 and 45 deg abd/scaption, and forward elevation; assisted active stretching to elbow flexors, wrist flexors and extensors, and forearm pronators, 3\"x10 each. MODALITIES: (15 minutes, unbilable): Cold and compression for post treatment soreness to R shoulder using GameReady, low pressure, 40-deg F, x15' while in sitting UE supported. Good relief with this. HEP: She is to continue with her existing HEP. She verbalizes understanding. [employer]  7/5/22: [id number]    Treatment/Session Summary:    · Treatment Assessment: She appears to be improving with pain and shoulder PROM. She is 3 weeks post op. · Communication/Consultation:  None today  · Equipment provided today:  None  · Recommendations/Intent for next treatment session: We will continue with modalities for pain modulation, initial R shoulder PROM treatment. Will review and update HEP as appropriate.      Total Treatment Billable Duration:  40 minutes   Time In: 5285  Time Out: [address], PT       Charge Capture  }Post Session Pain  PT Visit Info  [employer]  MD Guidelines  Scanned Media  Benefits  MyChart    Future Appointments   Date Time Provider Nader Gisell   7/11/2022 11:15 AM Milton Chappell, PTA [facility]   7/11/2022  1:00 PM Leron Mohs., MD [facility] AMB   7/13/2022  1:00 PM Melissa Monet, PT [facility] 7/18/2022  2:30 PM Abby [place], PT [facility]   7/21/2022  2:30 PM Brenda Sicard, PTA [facility]   7/25/2022  2:30 PM Brenda Sicard, PTA [facility]   7/28/2022  2:30 PM Brenda Sicard, [place] [facility]   8/1/2022  2:30 PM Abby [place], PT [facility]

## 2022-07-11 ENCOUNTER — HOSPITAL ENCOUNTER (OUTPATIENT)
Dept: PHYSICAL THERAPY | Age: 37
Setting detail: RECURRING SERIES
Discharge: HOME | End: 2022-07-14

## 2022-07-11 ENCOUNTER — OFFICE VISIT (OUTPATIENT)
Dept: ORTHOPEDIC SURGERY | Age: 37
End: 2022-07-11

## 2022-07-11 DIAGNOSIS — Z48.89 ENCOUNTER FOR POSTOPERATIVE CARE: Primary | ICD-10-CM

## 2022-07-11 PROCEDURE — 99024 POSTOP FOLLOW-UP VISIT: CPT | Performed by: ORTHOPAEDIC SURGERY

## 2022-07-11 PROCEDURE — 97110 THERAPEUTIC EXERCISES: CPT

## 2022-07-11 NOTE — PROGRESS NOTES
Rosalie Cardosodonn  : 1985  Primary: Lisa Otero Sc  Secondary:  [address] @ [address] [address]  Phone: [phone]  Fax: [phone] Plan Frequency: 2x/week (x2 weeks initially with plan to follow 12 weeks)    Plan of Care/Certification Expiration Date: 22      PT Visit Info: Total # of Visits to Date: 5      OUTPATIENT PHYSICAL THERAPY:OP NOTE TYPE: Treatment Note 2022       Episode  }Appt Desk       Treatment Diagnosis:  Pain in Right Shoulder (M25.511)  Stiffness of Right Shoulder, Not elsewhere classified (M25.611)   Strain of muscle(s) and tendon(s) of the rotator cuff of right shoulder, sequela (S46.011S)  Medical/Referring Diagnosis: s/p  Arthroscopy of right shoulder, arthroscopic subacromial decompression, arthroscopic distal clavicle resection, extensive debridement of SLAP tear, biceps labral complex, glenohumeral joint, subacromial space, mini-open rotator cuff repair and biceps tenodesis. Strain of muscle(s) and tendon(s) of the rotator cuff of right shoulder, initial encounter [S46.011A]  Referring Physician:  Casey Wilkinson MD MD Orders:  Eval & treat, home exercise program and passive motion; 2x/week x2 weeks. (22)  Date of Onset:  Onset Date: 22 (surgery; 22 injury)  Allergies:   Duloxetine and Erythromycin  Restrictions/Precautions: Post op restrictions and precautions R shoulder. Interventions Planned (Treatment may consist of any combination of the following):    Current Treatment Recommendations: Strengthening; ROM; Manual Therapy - Soft Tissue Mobilization; Manual Therapy - Joint Manipulation; Pain management; Home exercise program; Modalities     Subjective Comments: Pt states \"I have a little bit of pain and tightness. \"  Initial:}     (Mild pain reported.)   Post Session:        no increase reported  Medications Last Reviewed:  2022  Updated Objective Findings:  Not measured.   Treatment THERAPEUTIC EXERCISE: (40 minutes): Exercise for R shoulder motion with PROM x10 each and gentle physiologic shoulder motions with hold/relax stretch 30\"x3 in supine to ER in shoulder neutral and 45 deg abd/scaption, abduction, IR stabilized at 30 and 45 deg abd/scaption, and forward elevation; assisted active stretching to elbow flexors, wrist flexors and extensors, and forearm pronators, 3\"x10 each. MODALITIES: (15 minutes, unbilable): Cold and compression for post treatment soreness to R shoulder using GameReady, low pressure, 40-deg F, x15' while in sitting UE supported. HEP: She is to continue with her existing HEP. She verbalizes understanding. [employer] Portal  7/5/22: [id number]    Treatment/Session Summary:    · Treatment Assessment: Pt has flexion witin protocol limits but ER is still limited passively. She will see the MD today. · Communication/Consultation:  None today  · Equipment provided today:  None  · Recommendations/Intent for next treatment session: We will continue with modalities for pain modulation, initial R shoulder PROM treatment. Will review and update HEP as appropriate.      Total Treatment Billable Duration:  40 minutes   Time In: 2396  Time Out: Lyric Cerrato, PTA       Charge Capture  }Post Session Pain  PT Visit Info  [employer] Portal  MD Guidelines  Scanned Media  Benefits  MyChart    Future Appointments   Date Time Provider Nader Jameson   7/11/2022  1:00 PM MD BUBBA Welch AMB   7/13/2022  1:00 PM Amos Moreira PT [facility]   7/18/2022  2:30 PM Amos Moreira PT [facility]   7/21/2022  2:30 PM Randi Hatfield PTA [facility]   7/25/2022  2:30 PM Randi Hatfield PTA [facility]   7/28/2022  2:30 PM Randi Hatfield PTA [facility]   8/1/2022  2:30 PM Amos Moreira PT [facility]

## 2022-07-11 NOTE — PROGRESS NOTES
Name: Audrey Blackburn  YOB: 1985  Gender: female  MRN: [id number]          HPI: Audrey Blackburn is a 40 y.o. right-hand-dominant female 3.5 weeks status post arthroscopy right shoulder ASD, ADCR, extensive debridement SLAP tear, biceps labral complex, glenohumeral joint, subacromial space, mini open rotator cuff repair and biceps tenodesis. Operative findings are notable for a 3 cm rotator cuff tear. She returns noting she is doing better      ROS/Meds/PSH/PMH/FH/SH: A ten system review of systems was performed and is negative other than what is in the HPI. Tobacco:  reports that she quit smoking about 9 months ago. Her smoking use included cigarettes. She has a 10.00 pack-year smoking history. She has never used smokeless tobacco.  There were no vitals taken for this visit. Physical Examination:  She is an awake alert pleasant female ambulating without difficulty  She has restricted range of cervical spine motion with bilateral trapezial tenderness right greater than left    The left shoulder has 0 to 180 degrees of active and 0 to 180 degrees passive forward elevation. Pain at extremes of motion and in the overhead position  Internal rotation is to T6. External rotation is to 60 degrees at the side. In the 90 degree abducted position 90 degrees of external and 90 degrees internal rotation  The AC joint is tender  SC joint is non-tender. Greater tuberosity is non-tender. negative biceps  Negative O'Briens sign  negative lift-off sign  Negative belly press sign  Negative bear huggers sign  negative drop sign  negative hornblower's sign  No posterior glenohumeral joint line tenderness. No evident excessive external rotation  Rotator cuff strength is 5/5.  negative external rotation stress test.   Negative empty can sign  There is no evident anterior or posterior apprehension with a negative sulcus sign.    No instability  negative external and internal Rotation lag sign  Neurovascularly intact. The right shoulder has well-healed incisions  Passive forward elevation is 0-1 30  ER to 30 degrees  Biceps has good cosmetic appearance  No erythema  She is neurovascularly intact      Data Reviewed:      Impression:   1. Encounter for postoperative care       · status post arthroscopy right shoulder ASD, ADCR, extensive debridement SLAP tear, biceps labral complex, glenohumeral joint, subacromial space, mini open rotator cuff repair and biceps tenodesis 3.5 weeks   Left shoulder pain   History of right-sided DVT  · History of seizure disorder   · history of RSD  · Throat pain  · Back pain  · Neck spasm      Plan:   I discussed the plan with the patient. She will continue physical therapy working on passive motion. I will recheck her back in 2 weeks    Follow up: Return in about 2 weeks (around 7/25/2022).              Hiro Ocampo MD

## 2022-07-13 ENCOUNTER — HOSPITAL ENCOUNTER (OUTPATIENT)
Dept: PHYSICAL THERAPY | Age: 37
Setting detail: RECURRING SERIES
Discharge: HOME | End: 2022-07-16

## 2022-07-13 PROCEDURE — 97140 MANUAL THERAPY 1/> REGIONS: CPT

## 2022-07-13 PROCEDURE — 97110 THERAPEUTIC EXERCISES: CPT

## 2022-07-13 NOTE — PROGRESS NOTES
Viviane Ermias  : 1985  Primary: Zahira Hodgkin Sc  Secondary:  [address] @ [address] [address]  Phone: [phone]  Fax: [phone] Plan Frequency: 2x/week (x2 weeks initially with plan to follow 12 weeks)    Plan of Care/Certification Expiration Date: 22      PT Visit Info: Total # of Visits to Date: 6      OUTPATIENT PHYSICAL THERAPY:OP NOTE TYPE: Treatment Note 2022       Episode  }Appt Desk       Treatment Diagnosis:  Pain in Right Shoulder (M25.511)  Stiffness of Right Shoulder, Not elsewhere classified (M25.611)   Strain of muscle(s) and tendon(s) of the rotator cuff of right shoulder, sequela (S46.011S)  Medical/Referring Diagnosis: s/p  Arthroscopy of right shoulder, arthroscopic subacromial decompression, arthroscopic distal clavicle resection, extensive debridement of SLAP tear, biceps labral complex, glenohumeral joint, subacromial space, mini-open rotator cuff repair and biceps tenodesis. Strain of muscle(s) and tendon(s) of the rotator cuff of right shoulder, initial encounter [S46.011A]  Referring Physician:  Stephanie Ceja MD MD Orders:  Eval & treat, home exercise program and passive motion; 2x/week x2 weeks. (22)  Date of Onset:  Onset Date: 22 (surgery; 22 injury)  Allergies:   Duloxetine and Erythromycin  Restrictions/Precautions: Post op restrictions and precautions R shoulder. Interventions Planned (Treatment may consist of any combination of the following):    Current Treatment Recommendations: Strengthening; ROM; Manual Therapy - Soft Tissue Mobilization; Manual Therapy - Joint Manipulation; Pain management; Home exercise program; Modalities     Subjective Comments: Pt reports mild pain. Initial:}     (Mild pain reported.)   Post Session:        no increase reported  Medications Last Reviewed:  2022  Updated Objective Findings:  Not measured.   Treatment   MANUAL THERAPY: (10 minutes): Soft tissue mobilization was utilized and necessary because of the patient's painful spasm and restricted motion of soft tissue. Pt was positioned in prone with soft tissue mobilization to R upper traps, teres and rhomboids. To decrease pain and muscle guarding. THERAPEUTIC EXERCISE: (30 minutes): Exercise for R shoulder motion with PROM x10 each and gentle physiologic shoulder motions with hold/relax stretch 30\"x3 in supine to ER in shoulder neutral and 45 deg abd/scaption, abduction, IR stabilized at 30 and 45 deg abd/scaption, and forward elevation; assisted active stretching to elbow flexors, wrist flexors and extensors, and forearm pronators, 3\"x10 each. Cervical AROM x10 ea direction, holding lateral flexion 10 sec to stretch R upper trapezius  Chin tucks x10  Pendulums x20 each direction  Table walk out x3 (into flexion)    MODALITIES: (15 minutes, unbilable): Cold and compression for post treatment soreness to R shoulder using GameReady, low pressure, 40-deg F, x15' while in sitting UE supported. HEP: She is to continue with her existing HEP. She verbalizes understanding. [employer]  7/5/22: [id number]    Treatment/Session Summary:    · Treatment Assessment: Pt is tight and guarded into ER passively. She had improved flexion with STM prior to PROM. Will continue to PROM per MD for 2 weeks. · Communication/Consultation:  None today  · Equipment provided today:  None  · Recommendations/Intent for next treatment session: We will continue with modalities for pain modulation, initial R shoulder PROM treatment. Will review and update HEP as appropriate.      Total Treatment Billable Duration:  40 minutes   Time In: 7252  Time Out: 240 [facility]       Charge Capture  }Post Session Pain  PT Visit Info  [employer] Portal  MD Guidelines  Scanned Media  Benefits  [employer]    Future Appointments   Date Time Provider Nader Gisell   7/18/2022  2:30 PM Roly Clifton PTA [facility]   7/21/2022  2:30 PM Jewel Shed, PTA [facility]   7/25/2022  2:30 PM Jewel Shed, PTA [facility]   7/25/2022  3:30 PM MD BUBBA DesouzaL AMB   7/28/2022  2:30 PM Jewel Shed, PTA [facility]   8/1/2022  2:30 PM [facility]

## 2022-07-18 ENCOUNTER — HOSPITAL ENCOUNTER (OUTPATIENT)
Dept: PHYSICAL THERAPY | Age: 37
Setting detail: RECURRING SERIES
Discharge: HOME | End: 2022-07-21

## 2022-07-18 PROCEDURE — 97110 THERAPEUTIC EXERCISES: CPT

## 2022-07-18 NOTE — PROGRESS NOTES
Michaelle Pena  : 1985  Primary: Wil Fox Sc  Secondary:  [address] @ [address] [address]  Phone: [phone]  Fax: [phone] Plan Frequency: 2x/week (x2 weeks initially with plan to follow 12 weeks)    Plan of Care/Certification Expiration Date: 22      PT Visit Info: Total # of Visits to Date: 7      OUTPATIENT PHYSICAL THERAPY:OP NOTE TYPE: Treatment Note 2022       Episode  }Appt Desk       Treatment Diagnosis:  Pain in Right Shoulder (M25.511)  Stiffness of Right Shoulder, Not elsewhere classified (M25.611)   Strain of muscle(s) and tendon(s) of the rotator cuff of right shoulder, sequela (S46.011S)  Medical/Referring Diagnosis: s/p  Arthroscopy of right shoulder, arthroscopic subacromial decompression, arthroscopic distal clavicle resection, extensive debridement of SLAP tear, biceps labral complex, glenohumeral joint, subacromial space, mini-open rotator cuff repair and biceps tenodesis. Strain of muscle(s) and tendon(s) of the rotator cuff of right shoulder, initial encounter [S46.011A]  Referring Physician:  Mike Uribe MD MD Orders:  Eval & treat, home exercise program and passive motion; 2x/week x2 weeks. (22)  Date of Onset:  Onset Date: 22 (surgery; 22 injury)  Allergies:   Duloxetine and Erythromycin  Restrictions/Precautions: Post op restrictions and precautions R shoulder. Interventions Planned (Treatment may consist of any combination of the following):    Current Treatment Recommendations: Strengthening; ROM; Manual Therapy - Soft Tissue Mobilization; Manual Therapy - Joint Manipulation; Pain management; Home exercise program; Modalities     Subjective Comments: Pt reports mild pain and tightness. Initial:}     (mild to moderate pain)   Post Session:        no increase reported  Medications Last Reviewed:  2022  Updated Objective Findings:  Not measured.   Treatment   MANUAL THERAPY: (5 minutes): Soft tissue mobilization was utilized and necessary because of the patient's painful spasm and restricted motion of soft tissue. Pt was positioned in sitting with soft tissue mobilization to R upper traps, teres and rhomboids. To decrease pain and muscle guarding. THERAPEUTIC EXERCISE: (30 minutes): Exercise for R shoulder motion with PROM x10 each and gentle physiologic shoulder motions with hold/relax stretch 30\"x3 in supine to ER in shoulder neutral and 45 deg abd/scaption, abduction, IR stabilized at 30 and 45 deg abd/scaption, and forward elevation; assisted active stretching to elbow flexors, wrist flexors and extensors, and forearm pronators, 3\"x10 each. Supine wand ER 2x10  Table walk out x3 (into flexion)    MODALITIES: (15 minutes, unbilable): Cold and compression for post treatment soreness to R shoulder using GameReady, low pressure, 40-deg F, x15' while in sitting UE supported. HEP: She is to continue with her existing HEP. She verbalizes understanding. [employer]  7/5/22: [id number]    Treatment/Session Summary:    Treatment Assessment: Pt was able to relax after soft tissue mobilization and the wand ER. She is still limited with external rotation. Continue to progress per MD protocol. Communication/Consultation:  None today  Equipment provided today:  None  Recommendations/Intent for next treatment session: We will continue with modalities for pain modulation, initial R shoulder PROM treatment. Will review and update HEP as appropriate.      Total Treatment Billable Duration:  35 minutes (pt was 10 min late)  Time In: 1440  Time Out: 1530    Roxine Meth, PTA       Charge Capture  }Post Session Pain  PT Visit Info  [employer]  MD Guidelines  Scanned Media  Benefits  MyChart    Future Appointments   Date Time Provider Nader Jameson   7/21/2022  2:30 PM Roxine Meth, [place] [facility]   7/25/2022  2:30 PM Roxine Meth, PTA [facility]   7/25/2022  3:30 PM MD BUBBA Bryson [facility]   7/28/2022  2:30 PM Bassam Hurt, [facility]   8/1/2022  2:30 PM Gokul Hahn, PT [facility]

## 2022-07-21 ENCOUNTER — HOSPITAL ENCOUNTER (OUTPATIENT)
Dept: PHYSICAL THERAPY | Age: 37
Setting detail: RECURRING SERIES
Discharge: HOME | End: 2022-07-24

## 2022-07-21 PROCEDURE — 97110 THERAPEUTIC EXERCISES: CPT

## 2022-07-21 NOTE — PROGRESS NOTES
Rosalie Warner  : 1985  Primary: Teddy Pb Sc  Secondary:  [address] @ [address] [address]  Phone: [phone]  Fax: [phone] Plan Frequency: 2x/week (x2 weeks initially with plan to follow 12 weeks)    Plan of Care/Certification Expiration Date: 22      PT Visit Info: Total # of Visits to Date: 8      OUTPATIENT PHYSICAL THERAPY:OP NOTE TYPE: Treatment Note 2022       Episode  }Appt Desk       Treatment Diagnosis:  Pain in Right Shoulder (M25.511)  Stiffness of Right Shoulder, Not elsewhere classified (M25.611)   Strain of muscle(s) and tendon(s) of the rotator cuff of right shoulder, sequela (S46.011S)  Medical/Referring Diagnosis: s/p  Arthroscopy of right shoulder, arthroscopic subacromial decompression, arthroscopic distal clavicle resection, extensive debridement of SLAP tear, biceps labral complex, glenohumeral joint, subacromial space, mini-open rotator cuff repair and biceps tenodesis. Strain of muscle(s) and tendon(s) of the rotator cuff of right shoulder, initial encounter [S46.011A]  Referring Physician:  Casey Wilkinson MD MD Orders:  Eval & treat, home exercise program and passive motion; 2x/week x2 weeks. (22)  Date of Onset:  Onset Date: 22 (surgery; 22 injury)  Allergies:   Duloxetine and Erythromycin  Restrictions/Precautions: Post op restrictions and precautions R shoulder. Interventions Planned (Treatment may consist of any combination of the following):    Current Treatment Recommendations: Strengthening; ROM; Manual Therapy - Soft Tissue Mobilization; Manual Therapy - Joint Manipulation; Pain management; Home exercise program; Modalities     Subjective Comments: Pt states \"The shoulder is really sore and achy. I didn't sleep well because it started to spasm. \"  Initial:}     (Moderate to high levels of pain)   Post Session:        no increase reported  Medications Last Reviewed: 7/21/2022  Updated Objective Findings:  Not measured. Treatment     THERAPEUTIC EXERCISE: (40 minutes): Exercise for R shoulder motion with PROM x10 each and gentle physiologic shoulder motions with hold/relax stretch 30\"x3 in supine to ER in shoulder neutral and 45 deg abd/scaption, abduction, IR stabilized at 30 and 45 deg abd/scaption, and forward elevation; assisted active stretching to elbow flexors, wrist flexors and extensors, and forearm pronators, 3\"x10 each. (Performed prior to passive ROM)  Supine wand ER 3x10  Table walk out x10 (into flexion)  Pendulums x 20 each    MODALITIES: (15 minutes, unbilable): Cold and compression for post treatment soreness to R shoulder using GameReady, low pressure, 40-deg F, x15' while in sitting UE supported. HEP: She is to continue with her existing HEP. She verbalizes understanding. [employer]  7/5/22: [id number]    Treatment/Session Summary:    Treatment Assessment: Pt has improved flexion but she is tight and guarded with ER. She has difficulty reaching ER past neutral. Pt reports icing when she has a muscle spasm. Communication/Consultation:  None today  Equipment provided today:  None  Recommendations/Intent for next treatment session: We will continue with modalities for pain modulation, initial R shoulder PROM treatment. Will review and update HEP as appropriate.      Total Treatment Billable Duration:  40 minutes  Time In: 1430  Time Out: 1530    Carlos Young PTA       Charge Capture  }Post Session Pain  PT Visit Info  [employer] Portal  MD Guidelines  Scanned Media  Benefits  MyChart    Future Appointments   Date Time Provider Nader Jameson   7/25/2022  2:30 PM Pricilla Khoury [facility]   7/25/2022  3:30 PM MD BUBBA Figueroa [facility]   7/28/2022  2:30 PM [facility]   8/1/2022  2:30 PM Emmanuel Reyes, PT [facility]

## 2022-07-25 ENCOUNTER — HOSPITAL ENCOUNTER (OUTPATIENT)
Dept: PHYSICAL THERAPY | Age: 37
Setting detail: RECURRING SERIES
Discharge: HOME | End: 2022-07-28

## 2022-07-25 ENCOUNTER — OFFICE VISIT (OUTPATIENT)
Dept: ORTHOPEDIC SURGERY | Age: 37
End: 2022-07-25

## 2022-07-25 DIAGNOSIS — Z48.89 ENCOUNTER FOR POSTOPERATIVE CARE: Primary | ICD-10-CM

## 2022-07-25 PROCEDURE — 97140 MANUAL THERAPY 1/> REGIONS: CPT

## 2022-07-25 PROCEDURE — 97110 THERAPEUTIC EXERCISES: CPT

## 2022-07-25 PROCEDURE — 99024 POSTOP FOLLOW-UP VISIT: CPT | Performed by: ORTHOPAEDIC SURGERY

## 2022-07-25 ASSESSMENT — PAIN SCALES - GENERAL: PAINLEVEL_OUTOF10: 3

## 2022-07-25 NOTE — PROGRESS NOTES
Perfecto Espinosa  : 1985  Primary: Cynthia Allison  Secondary:  [address] @ [address] [facility] [address]  [place] [address]  Phone: [phone]  Fax: [phone] Plan Frequency: 2x/week (x2 weeks initially with plan to follow 12 weeks)    Plan of Care/Certification Expiration Date: 22      PT Visit Info: Total # of Visits to Date: 9      OUTPATIENT PHYSICAL THERAPY:OP NOTE TYPE: Treatment Note 2022       Episode  }Appt Desk       Treatment Diagnosis:  Pain in Right Shoulder (M25.511)  Stiffness of Right Shoulder, Not elsewhere classified (M25.611)   Strain of muscle(s) and tendon(s) of the rotator cuff of right shoulder, sequela (S46.011S)  Medical/Referring Diagnosis: s/p  Arthroscopy of right shoulder, arthroscopic subacromial decompression, arthroscopic distal clavicle resection, extensive debridement of SLAP tear, biceps labral complex, glenohumeral joint, subacromial space, mini-open rotator cuff repair and biceps tenodesis. Strain of muscle(s) and tendon(s) of the rotator cuff of right shoulder, initial encounter [S46.011A]  Referring Physician:  Selena Bowers MD MD Orders:  Eval & treat, home exercise program and passive motion; 2x/week x2 weeks. (22)  Date of Onset:  Onset Date: 22 (surgery; 22 injury)  Allergies:   Duloxetine and Erythromycin  Restrictions/Precautions: Post op restrictions and precautions R shoulder. Interventions Planned (Treatment may consist of any combination of the following):    Current Treatment Recommendations: Strengthening; ROM; Manual Therapy - Soft Tissue Mobilization; Manual Therapy - Joint Manipulation; Pain management; Home exercise program; Modalities     Subjective Comments: Pt states \"The pain is a little better. \"  Initial:}    3 /10  Post Session:       3/10  Medications Last Reviewed:  2022  Updated Objective Findings:  Not measured.   Treatment   MANUAL THERAPY: (15 minutes): soft tissue mobilization to R upper trapezius, rhomboids and subscapularis to decrease pain and muscle guarding. Performed prior to PROM. THERAPEUTIC EXERCISE: (30 minutes): Exercise for R shoulder motion with PROM x10 each and gentle physiologic shoulder motions with hold/relax stretch 30\"x3 in supine to ER in shoulder neutral and 45 deg abd/scaption, abduction, IR stabilized at 30 and 45 deg abd/scaption, and forward elevation; assisted active stretching to elbow flexors, wrist flexors and extensors, and forearm pronators, 3\"x10 each. HEP: She is to continue with her existing HEP. She verbalizes understanding. [employer] Portal  7/5/22: [id number]    Treatment/Session Summary:    Treatment Assessment: Pt reported less tightness after manual therapy. She is still tight and guarded into external rotation. Communication/Consultation:  None today  Equipment provided today:  None  Recommendations/Intent for next treatment session: We will continue with modalities for pain modulation, initial R shoulder PROM treatment. Will review and update HEP as appropriate.      Total Treatment Billable Duration:  45 minutes  Time In: 1430  Time Out: 1515    Bassam Hurt PTA       Charge Capture  }Post Session Pain  PT Visit Info  [employer]  MD Guidelines  Scanned Media  Benefits  MyChart    Future Appointments   Date Time Provider Nader Jameson   7/28/2022  2:30 PM Pricilla Wilson [facility]   8/1/2022  2:30 PM Jone Galen [place], PT [facility]   8/30/2022  2:45 PM MD BUBBA Bryson [facility]

## 2022-07-25 NOTE — PROGRESS NOTES
sign  Neurovascularly intact. The right shoulder has well-healed incisions  Active forward elevation is 0-90  Passive forward elevation is 0-1 60  ER to 30 degrees  Biceps has good cosmetic appearance  No erythema  She is neurovascularly intact      Data Reviewed:      Impression:   1. Encounter for postoperative care       status post arthroscopy right shoulder ASD, ADCR, extensive debridement SLAP tear, biceps labral complex, glenohumeral joint, subacromial space, mini open rotator cuff repair and biceps tenodesis 5.5 weeks  Left shoulder pain  History of right-sided DVT  History of seizure disorder   history of RSD  Throat pain  Back pain  Neck spasm      Plan:   I discussed the plan with the patient. We will now advance her to full motion and full strength in physical therapy. I will recheck her back in 1 month    Follow up: Return in about 1 month (around 8/25/2022).              Suhail Daniel MD

## 2022-07-28 ENCOUNTER — HOSPITAL ENCOUNTER (OUTPATIENT)
Dept: PHYSICAL THERAPY | Age: 37
Setting detail: RECURRING SERIES
Discharge: HOME | End: 2022-07-31

## 2022-07-28 PROCEDURE — 97110 THERAPEUTIC EXERCISES: CPT

## 2022-07-28 PROCEDURE — 97140 MANUAL THERAPY 1/> REGIONS: CPT

## 2022-07-28 ASSESSMENT — PAIN SCALES - GENERAL: PAINLEVEL_OUTOF10: 5

## 2022-07-28 NOTE — PROGRESS NOTES
Leotis Meigs  : 1985  Primary: Geneva Chloeariel Sc  Secondary:  [address] @ [address] [address]  Phone: [phone]  Fax: [phone] Plan Frequency: 2x/week (x2 weeks initially with plan to follow 12 weeks)    Plan of Care/Certification Expiration Date: 22      PT Visit Info: Total # of Visits to Date: 10      OUTPATIENT PHYSICAL THERAPY:OP NOTE TYPE: Treatment Note 2022       Episode  }Appt Desk       Treatment Diagnosis:  Pain in Right Shoulder (M25.511)  Stiffness of Right Shoulder, Not elsewhere classified (M25.611)   Strain of muscle(s) and tendon(s) of the rotator cuff of right shoulder, sequela (S46.011S)  Medical/Referring Diagnosis: s/p  Arthroscopy of right shoulder, arthroscopic subacromial decompression, arthroscopic distal clavicle resection, extensive debridement of SLAP tear, biceps labral complex, glenohumeral joint, subacromial space, mini-open rotator cuff repair and biceps tenodesis. Strain of muscle(s) and tendon(s) of the rotator cuff of right shoulder, initial encounter [S46.011A]  Referring Physician:  Katie Tinsley.MD STOVALL Orders:  Eval & treat, home exercise program and passive motion; 2x/week x2 weeks. (22)  Date of Onset:  Onset Date: 22 (surgery; 22 injury)  Allergies:   Duloxetine and Erythromycin  Restrictions/Precautions: Post op restrictions and precautions R shoulder. Interventions Planned (Treatment may consist of any combination of the following):    Current Treatment Recommendations: Strengthening; ROM; Manual Therapy - Soft Tissue Mobilization; Manual Therapy - Joint Manipulation; Pain management; Home exercise program; Modalities     Subjective Comments: Pt states that she had a lot of pain yesterday. She took a pain pill and feels better. Initial:}    5 /10  Post Session:       5/10  Medications Last Reviewed:  2022  Updated Objective Findings:  Not measured.   Treatment   MANUAL THERAPY: (15 minutes): soft tissue mobilization to R upper trapezius, rhomboids and subscapularis to decrease pain and muscle guarding. Performed prior to PROM. THERAPEUTIC EXERCISE: (30 minutes): Exercise for R shoulder motion with PROM x10 each and gentle physiologic shoulder motions with hold/relax stretch 30\"x3 in supine to ER in shoulder neutral and 45 deg abd/scaption, abduction, IR stabilized at 30 and 45 deg abd/scaption, and forward elevation; assisted active stretching to elbow flexors, wrist flexors and extensors, and forearm pronators, 3\"x10 each. Pulleys x20 each flexion and scaption in front of mirror, seated  Rows with yellow band 2x10 B  Shoulder extension yellow 2x10 B   Side lying abduction 2x10 with assist  Side lying middle trap 2x10 with assist    HEP: Pt was issued pulleys for home. [employer]  7/5/22: [id number]    Treatment/Session Summary:    Treatment Assessment: Pt had increased ER passively and she states that she has been using the wand for ER at home. She required frequent verbal and visual cues to depress the scapula during exercise. Communication/Consultation:  None today  Equipment provided today: pulleys  Recommendations/Intent for next treatment session: We will continue with modalities for pain modulation, initial R shoulder PROM treatment. Will review and update HEP as appropriate.      Total Treatment Billable Duration:  45 minutes  Time In: 1430  Time Out: 1515    Leroy Chandler PTA       Charge Capture  }Post Session Pain  PT Visit Info  [employer] Portal  MD Guidelines  Scanned Media  Benefits  MyChart    Future Appointments   Date Time Provider Nader Jameson   8/1/2022  2:30 PM [facility]ing [facility]   8/4/2022  1:45 PM Leroy Chandler PTA [facility]   8/8/2022  1:45 PM Efren Files, PT [facility]   8/10/2022  1:45 PM Efren Files, PT [facility]   8/15/2022  1:45 PM Efren Files, PT [facility]   8/18/2022 1:45 PM [facility]   8/22/2022  1:45 PM Keegan Sears, PT [facility]   8/25/2022  1:45 PM Keegan Sears, PT [facility]   8/30/2022  1:45 PM [facility] Eastman[facility]   8/30/2022  2:45 PM MD BUBBA Morrison [facility]   9/2/2022 11:15 AM Keegan Sears, PT [facility]

## 2022-08-01 ENCOUNTER — HOSPITAL ENCOUNTER (OUTPATIENT)
Dept: PHYSICAL THERAPY | Age: 37
Setting detail: RECURRING SERIES
Discharge: HOME | End: 2022-08-04

## 2022-08-01 PROCEDURE — 97110 THERAPEUTIC EXERCISES: CPT

## 2022-08-01 ASSESSMENT — PAIN SCALES - GENERAL: PAINLEVEL_OUTOF10: 5

## 2022-08-01 NOTE — PROGRESS NOTES
Violeta Joni  : 1985  Primary: Nitza Sow Sc  Secondary:  [address] @ [address] [address]  Phone: [phone]  Fax: [phone] Plan Frequency: 2x/week (x2 weeks initially with plan to follow 12 weeks)    Plan of Care/Certification Expiration Date: 22      PT Visit Info: Total # of Visits to Date: 6      OUTPATIENT PHYSICAL THERAPY:OP NOTE TYPE: Progress Report 2022               Episode  Appt Desk         Treatment Diagnosis:  Pain in Right Shoulder (M25.511)  Stiffness of Right Shoulder, Not elsewhere classified (M25.611)   Strain of muscle(s) and tendon(s) of the rotator cuff of right shoulder, sequela (S46.011S)  Medical/Referring Diagnosis: s/p  Arthroscopy of right shoulder, arthroscopic subacromial decompression, arthroscopic distal clavicle resection, extensive debridement of SLAP tear, biceps labral complex, glenohumeral joint, subacromial space, mini-open rotator cuff repair and biceps tenodesis. Strain of muscle(s) and tendon(s) of the rotator cuff of right shoulder, initial encounter [S46.011A]  Referring Physician:  Collin Bae MD MD Orders:  Eval & treat, home exercise program and passive motion; 2x/week x2 weeks. (22)  Return MD Appt:  22  Date of Onset:  Onset Date: 22 (surgery; 22 injury)     Allergies:  Duloxetine and Erythromycin  Restrictions/Precautions: Post op restrictions and precautions R shoulder  Medications Last Reviewed:  2022      OBJECTIVE   Observation/Orthostatic Postural Assessment: Walks into the clinic with R UE in sling, no significant distress. Palpation: Tender to R superior and anterior shoulder, subscap, distal pecs, upper trap, scalenes.    ROM:  PROM RUE (degrees)  R Shoulder Flex  0-180: 105 (forward elevation)  R Shoulder ABduction 0-180: 80 (with scapula stabilized)  R Shoulder Int Rotation  0-70: 60 (stabilized at 45 and 60-80 deg abd)  R Shoulder Ext Rotation  0-90: 40 (in shoulder neutral, 55 at 45 and 60 abd)  AROM RUE (degrees)  R Shoulder Flexion 0-180: 80 (forward elevation)  R Shoulder Int Rotation  0-70:  (to posterior hip behind back)  R Shoulder Ext Rotation 0-90: 35 (by side and supported in abduction)  Strength:   R Shoulder: 3- against gravity to flex, abd, ER  ASSESSMENT   Progress Assessment: Ms. Nora Preciado is now 6 weeks s/p arthroscopy of right shoulder with arthroscopic subacromial decompression, arthroscopic distal clavicle resection, extensive debridement of a type 2 SLAP tear, biceps labral complex, glenohumeral joint, subacromial space, mini-open rotator cuff repair for a 3 cm full-thickness subscapularis and infraspinatus rotator cuff tear, and biceps tenodesis on 6/17/22. She is making slow, steady progress. She continues to be quite pain dominant and stiff to the shoulder. She is just starting into active phase after cuff repair, and is quite weak to the cuff and shoulder girdle as expected. She has not yet achieved her short term goal, but making gains toward them. 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 We will plan to continue with her existing treatment plan at the time. Problem List: (Impacting functional limitations): Body Structures, Functions, Activity Limitations Requiring Skilled Therapeutic Intervention: Decreased ROM; Decreased tolerance to work activity; Increased pain      Therapy Prognosis:   Therapy Prognosis: Fair (to good due to injury and co-morbidities)     PLAN   Effective Dates: TO Plan of Care/Certification Expiration Date: 09/27/22     Frequency/Duration: Plan Frequency: 2x/week with plan to follow 12 weeks     Interventions Planned (Treatment may consist of any combination of the following):    Current Treatment Recommendations: ROM;  Manual Therapy - Soft Tissue Mobilization; Manual Therapy - Joint Manipulation; Pain management; Home exercise program; Modalities; progressing to Strengthening when appropriate. Goals: (Goals have been discussed and agreed upon with patient.)  Short-Term Functional Goals: Time Frame: 4-6 weeks  Report no more than 5/10 intermittent pain to R shoulder with compensatory use during basic functional activities, and score less than 50% on the DASH. Ongoing 8/1/22  R shoulder PROM forward elevation greater than 135 degrees and external rotation greater than 60 degrees to progress into functional ranges. Ongoing, progressing 8/1/22  Demonstrate good R shoulder isometric strength with manual testing to progress into strength phase. Met 8/1/22  Independent with initial HEP. Met 8/1/22  Discharge Goals: Time Frame:12 weeks  No more than 3/10 intermittent pain R shoulder with return to normalized household and work-related activities, and score less than 30% on the DASH. R shoulder AROM forward elevation greater than 135 degrees, external rotation greater than 60 degrees, and strength to shoulder are grossly WNL's for safe use with normalized activities. Demonstrate good functional shoulder strength and endurance for return to normalized household and work activities. Independent with advanced shoulder HEP for continued self-management. Medical Necessity:   She is 6 weeks post op R shoulder. Impairments listed above are limiting comfort and basic functional use her R/dominant UE. Reason For Services/Other Comments:  Patient continues to require skilled intervention due to the complexity fo the surgical procedure and need for safe, progressive rehab to return to functional use of her R/dominant UE. Co-morbid cervical pain, strain and L shoulder pain and cuff pathology, and history of CRPS to B LE will mostly likely make progress slower.      Nupur Fierro, PT, MSPT, OCS       Post Session Pain  Charge Capture  PT Visit Info  POC Link  Treatment Note Link  MD Guidelines  Gail

## 2022-08-01 NOTE — PROGRESS NOTES
Uriel Splinter  : 1985  Primary: Gabriel García Sc  Secondary:  [address] @ [address] [address]  Phone: [phone]  Fax: [phone] Plan Frequency: 2x/week (x2 weeks initially with plan to follow 12 weeks)    Plan of Care/Certification Expiration Date: 22      PT Visit Info: Total # of Visits to Date: 11     Visit Count:  11   OUTPATIENT PHYSICAL THERAPY:OP NOTE TYPE: Treatment Note 2022       Episode  Appt Desk             Treatment Diagnosis:  Pain in Right Shoulder (M25.511)  Stiffness of Right Shoulder, Not elsewhere classified (M25.611)   Strain of muscle(s) and tendon(s) of the rotator cuff of right shoulder, sequela (S46.011S)  Medical/Referring Diagnosis: s/p  Arthroscopy of right shoulder, arthroscopic subacromial decompression, arthroscopic distal clavicle resection, extensive debridement of SLAP tear, biceps labral complex, glenohumeral joint, subacromial space, mini-open rotator cuff repair and biceps tenodesis. Strain of muscle(s) and tendon(s) of the rotator cuff of right shoulder, initial encounter [S46.011A]  Referring Physician:  Rico Flores MD MD Orders:  eval & treat, home exercise program and Full Motion, Full Strength; 2x/week 4 weeks (22)  Date of Onset:  Onset Date: 22 (surgery; 22 injury)  Allergies:   Duloxetine and Erythromycin  Restrictions/Precautions: Post op restrictions and precautions R shoulder. Interventions Planned (Treatment may consist of any combination of the following):    Current Treatment Recommendations: Strengthening; ROM; Manual Therapy - Soft Tissue Mobilization; Manual Therapy - Joint Manipulation; Pain management; Home exercise program; Modalities     Subjective Comments: Say her shoulder was very painful yesterday. Had to take her pain meds. Was not sure why it was more painful. Says she had her follow up with Dr. Rosette lira Inteirina.  He gave her a new order to move the active phase. Has been working on her HEP she has been given so far, especailly the pulleys. Initial:    5/10Post Session:       9/10  Medications Last Reviewed:  8/1/2022  Updated Objective Findings:    See MA (8/1/22)  Treatment   MANUAL THERAPY: (5 minutes): Positional Release technique to R subscapularis, distal pecs for muscle restriction. THERAPEUTIC EXERCISE: (40 minutes): Exercise for R shoulder motion with guided AAROM without and with light over-pressure, 3\"x10 each to ER at 30, 45, and 60-80 deg abd, abduction, IR stabilized at 45 and 60 deg abd, ER and IR at 90 deg scaption, and forward elevation; assisted Active Isolated Stretch to wrist flexors and extensors and modified Median and Radial nerve tensionors, 3\"x10 each. Reviewed and instructed in supine wand ER to be done at 30, 45, and 80 deg abd with HEP. Good understanding post.   Exercises for shoulder muscle activation with light resisted alternating isometrics in supine 0-deg ER and place and hold at  deg flex, x3 bouts each. MODALITIES: (15 minutes, un-billable): Cold and compression for post treatment soreness to R shoulder using GameReady, low pressure, 40-deg F, x15' while in sitting UE supported. Good relief with this. HEP: She is to continue with her existing HEP. Verbal instruction to work on pulleys in scapion, flexion, and modified abd as pain allows; and for the supine wan ER as above. She verbalizes understanding. [employer] Portal  7/5/22: [id number]    Treatment/Session Summary:    Treatment Assessment: She continues to be quite painful to the shoulder. Shoulder PROM is stiff with pain at end ranges and active guarding. Cuff weakness and pain related to muscle activation at this point is limiting. She is 6 weeks post op. Communication/Consultation:   New order from Dr. Nenita Justin to continue PT was reviewed. Equipment provided today:  None  Recommendations/Intent for next treatment session: We will continue with motion treatment R shoulder as pain and guarding allow; continue with initial, low level scapular and cuff muscle activation exercises; and modalities for pain as needed.     Total Treatment Billable Duration:  45 minutes  Time In: 1440  Time Out: [address], PT       Charge Capture Post Session Pain  PT Visit Info  [employer] Portal  MD Guidelines  Scanned Media  Benefits  MyChart    Future Appointments   Date Time Provider Nader Jameson   8/4/2022  1:45 PM [facility]   8/8/2022  1:45 PM Amos Hale, PT [facility]   8/10/2022  1:45 PM Amos Hale, PT [facility]   8/15/2022  1:45 PM Amos Hale, PT [facility]   8/18/2022  1:45 PM Amos Harish, PT [facility]   8/22/2022  1:45 PM Amos Harish, PT [facility]   8/25/2022  1:45 PM Amos Hale, PT [facility]   8/30/2022  1:45 PM Randi Hatfield, DERRICK [facility]   8/30/2022  2:45 PM MD BUBBA Welch [facility]   9/2/2022 11:15 AM Amos Harish, PT [facility]

## 2022-08-03 NOTE — PROGRESS NOTES
I am accessing Ms. Bhakta's chart as a part of our department's internal chart auditing process. I certify that Ms. Dwayne Patel is, or was, a patient in our department.   Thank you,  Jhon Chance, PT  8/3/2022

## 2022-08-04 ENCOUNTER — HOSPITAL ENCOUNTER (OUTPATIENT)
Dept: PHYSICAL THERAPY | Age: 37
Setting detail: RECURRING SERIES
Discharge: HOME | End: 2022-08-07

## 2022-08-04 PROCEDURE — 97110 THERAPEUTIC EXERCISES: CPT

## 2022-08-04 ASSESSMENT — PAIN SCALES - GENERAL: PAINLEVEL_OUTOF10: 6

## 2022-08-04 NOTE — PROGRESS NOTES
Thalia Gilbert  : 1985  Primary: Valentino Guambersalima Sc  Secondary:  [address] @ [address] [address]  Phone: [phone]  Fax: [phone] Plan Frequency: 2x/week (x2 weeks initially with plan to follow 12 weeks)    Plan of Care/Certification Expiration Date: 22      PT Visit Info: Total # of Visits to Date: 15      OUTPATIENT PHYSICAL THERAPY:OP NOTE TYPE: Treatment Note 2022       Episode  }Appt Desk       Treatment Diagnosis:  Pain in Right Shoulder (M25.511)  Stiffness of Right Shoulder, Not elsewhere classified (M25.611)   Strain of muscle(s) and tendon(s) of the rotator cuff of right shoulder, sequela (S46.011S)  Medical/Referring Diagnosis: s/p  Arthroscopy of right shoulder, arthroscopic subacromial decompression, arthroscopic distal clavicle resection, extensive debridement of SLAP tear, biceps labral complex, glenohumeral joint, subacromial space, mini-open rotator cuff repair and biceps tenodesis. Strain of muscle(s) and tendon(s) of the rotator cuff of right shoulder, initial encounter [S46.011A]  Referring Physician:  Adriana Macias MD MD Orders:  eval & treat, home exercise program and Full Motion, Full Strength; 2x/week 4 weeks (22  Date of Onset:  Onset Date: 22 (surgery; 22 injury)  Allergies:   Duloxetine and Erythromycin  Restrictions/Precautions: Post op restrictions and precautions R shoulder. Interventions Planned (Treatment may consist of any combination of the following):    Current Treatment Recommendations: Strengthening; ROM; Manual Therapy - Soft Tissue Mobilization; Manual Therapy - Joint Manipulation; Pain management; Home exercise program; Modalities     Subjective Comments: Pt reports increased soreness and pain. Initial:}    6 /10  Post Session:       6/10  Medications Last Reviewed:  2022  Updated Objective Findings:  Not measured.   Treatment     THERAPEUTIC EXERCISE: (40 minutes): Exercise for R shoulder motion with PROM x10 each and gentle physiologic shoulder motions with hold/relax stretch 30\"x3 in supine to ER in shoulder neutral and 45 deg abd/scaption, abduction, IR stabilized at 30 and 45 deg abd/scaption, and forward elevation; assisted active stretching to elbow flexors, wrist flexors and extensors, and forearm pronators, 3\"x10 each. Pulleys x20 each flexion and scaption, seated  Rows with yellow band 2x10 B  Shoulder extension yellow 2x10 B   Side lying abduction 2x10 with assist  Side lying middle trap 2x10 with assist  Side lying ER 2x10 with tactile cues    HEP: Pt was issued pulleys for home. [employer]  22: [id number]    Treatment/Session Summary:    Treatment Assessment: Pt requires tactile cues to perform activities with correct body mechanics. Communication/Consultation:  None today  Equipment provided today: pulleys  Recommendations/Intent for next treatment session: We will continue with modalities for pain modulation, initial R shoulder PROM treatment. Will review and update HEP as appropriate.      Total Treatment Billable Duration:  40 minutes  Time In: 3075  Time Out: [address], [facility]       Charge Capture  }Post Session Pain  PT Visit Info  [employer] Portal  MD Guidelines  Scanned Media  Benefits  MyChart    Future Appointments   Date Time Provider Nader Jameson   2022  1:45 PM Jone Galen Reyesside, PT [facility]   8/10/2022  1:45 PM Gara Lust, PT [facility]   8/15/2022 11:15 AM Gara Lust, PT [facility]   2022  1:45 PM Gara Lust, PT [facility]   2022  1:45 PM Gara Lust, PT [facility]   2022  1:45 PM Gara Lust, PT [facility]   2022  1:45 PM [facility]   2022  2:45 PM Onesimo Ferris MD [facility]   2022 11:15 AM Gara Lust, PT [facility]           Patsi Pin  : 1985  Primary: Simón Allison  Secondary:   [facility] @ [address] [place] [address]  Phone: [phone]  Fax: [phone] Plan Frequency: 2x/week (x2 weeks initially with plan to follow 12 weeks)    Plan of Care/Certification Expiration Date: 09/27/22      PT Visit Info: Total # of Visits to Date: 12     Visit Count:  12   OUTPATIENT PHYSICAL THERAPY:OP NOTE TYPE: Treatment Note 8/4/2022       Episode  Appt Desk             Treatment Diagnosis:  Pain in Right Shoulder (M25.511)  Stiffness of Right Shoulder, Not elsewhere classified (M25.611)   Strain of muscle(s) and tendon(s) of the rotator cuff of right shoulder, sequela (S46.011S)  Medical/Referring Diagnosis: s/p  Arthroscopy of right shoulder, arthroscopic subacromial decompression, arthroscopic distal clavicle resection, extensive debridement of SLAP tear, biceps labral complex, glenohumeral joint, subacromial space, mini-open rotator cuff repair and biceps tenodesis. Strain of muscle(s) and tendon(s) of the rotator cuff of right shoulder, initial encounter [S46.011A]  Referring Physician:  Kaleb Harvey MD MD Orders:  eval & treat, home exercise program and Full Motion, Full Strength; 2x/week 4 weeks (7/25/22)  Date of Onset:  Onset Date: 06/17/22 (surgery; 4/30/22 injury)  Allergies:   Duloxetine and Erythromycin  Restrictions/Precautions: Post op restrictions and precautions R shoulder. Interventions Planned (Treatment may consist of any combination of the following):    Current Treatment Recommendations: Strengthening; ROM; Manual Therapy - Soft Tissue Mobilization; Manual Therapy - Joint Manipulation; Pain management; Home exercise program; Modalities     Subjective Comments: Pt reports increased soreness and pain.   Initial:    6/10Post Session:       6/10  Medications Last Reviewed:  8/4/2022  Updated Objective Findings:    See ME (8/1/22)  Treatment   MANUAL THERAPY: (5 minutes): Positional Release technique to R subscapularis, distal pecs for muscle restriction. THERAPEUTIC EXERCISE: (40 minutes): Exercise for R shoulder motion with guided AAROM without and with light over-pressure, 3\"x10 each to ER at 30, 45, and 60-80 deg abd, abduction, IR stabilized at 45 and 60 deg abd, ER and IR at 90 deg scaption, and forward elevation; assisted Active Isolated Stretch to wrist flexors and extensors and modified Median and Radial nerve tensionors, 3\"x10 each. Reviewed and instructed in supine wand ER to be done at 30, 45, and 80 deg abd with HEP. Good understanding post.   Exercises for shoulder muscle activation with light resisted alternating isometrics in supine 0-deg ER and place and hold at  deg flex, x3 bouts each. MODALITIES: (15 minutes, un-billable): Cold and compression for post treatment soreness to R shoulder using GameReady, low pressure, 40-deg F, x15' while in sitting UE supported. Good relief with this. HEP: She is to continue with her existing HEP. Verbal instruction to work on pulleys in scapion, flexion, and modified abd as pain allows; and for the supine wan ER as above. She verbalizes understanding. [employer] Portal  7/5/22: [id number]    Treatment/Session Summary:    Treatment Assessment: Pt requires tactile cues to perform activities with correct body mechanics. Communication/Consultation:   New order from Dr. Víctor Cortez to continue PT was reviewed. Equipment provided today:  None  Recommendations/Intent for next treatment session: We will continue with motion treatment R shoulder as pain and guarding allow; continue with initial, low level scapular and cuff muscle activation exercises; and modalities for pain as needed.     Total Treatment Billable Duration:  45 minutes  Time In: 4472  Time Out: [address], [facility]       Charge Capture Post Session Pain  PT Visit Info  [employer] Portal  MD Guidelines  Scanned Media  Benefits  [employer]    Future Appointments   Date Time Provider Nader Gisell   8/8/2022  1:45 PM Carson Mason, PT [facility]   8/10/2022  1:45 PM Carson Mason, PT [facility]   8/15/2022 11:15 AM Carson Mason, PT [facility]   8/18/2022  1:45 PM [facility]   8/22/2022  1:45 PM Carson Mason, PT [facility]   8/25/2022  1:45 PM Carson Mason, PT [facility]   8/30/2022  1:45 PM Vidhya Parrish, PTA [facility]   8/30/2022  2:45 PM MD BUBBA Ferrer [facility]   9/2/2022 11:15 AM Carson Mason, PT [facility]

## 2022-08-08 ENCOUNTER — HOSPITAL ENCOUNTER (OUTPATIENT)
Dept: PHYSICAL THERAPY | Age: 37
Setting detail: RECURRING SERIES
Discharge: HOME | End: 2022-08-11

## 2022-08-08 PROCEDURE — 97110 THERAPEUTIC EXERCISES: CPT

## 2022-08-08 ASSESSMENT — PAIN SCALES - GENERAL: PAINLEVEL_OUTOF10: 6

## 2022-08-08 NOTE — PROGRESS NOTES
Dank Davila  : 1985  Primary: Jeana Soulier Sc  Secondary:  [address] @ [address] [address]  Phone: [phone]  Fax: [phone] Plan Frequency: 2x/week (x2 weeks initially with plan to follow 12 weeks)    Plan of Care/Certification Expiration Date: 22    PT Visit Info: Total # of Visits to Date: 15     Visit Count:  13   OUTPATIENT PHYSICAL THERAPY:OP NOTE TYPE: Treatment Note 2022       Episode  Appt Desk             Treatment Diagnosis:  Pain in Right Shoulder (M25.511)  Stiffness of Right Shoulder, Not elsewhere classified (M25.611)   Strain of muscle(s) and tendon(s) of the rotator cuff of right shoulder, sequela (S46.011S)  Medical/Referring Diagnosis: s/p  Arthroscopy of right shoulder, arthroscopic subacromial decompression, arthroscopic distal clavicle resection, extensive debridement of SLAP tear, biceps labral complex, glenohumeral joint, subacromial space, mini-open rotator cuff repair and biceps tenodesis. Strain of muscle(s) and tendon(s) of the rotator cuff of right shoulder, initial encounter [S46.011A]  Referring Physician:  Sammi Madison MD MD Orders:  eval & treat, home exercise program and Full Motion, Full Strength; 2x/week 4 weeks (22)  Date of Onset:  Onset Date: 22 (surgery; 22 injury)  Allergies:   Duloxetine and Erythromycin  Restrictions/Precautions: Post op restrictions and precautions R shoulder. Interventions Planned (Treatment may consist of any combination of the following):    Current Treatment Recommendations: Strengthening; ROM; Manual Therapy - Soft Tissue Mobilization; Manual Therapy - Joint Manipulation; Pain management; Home exercise program; Modalities     Subjective Comments: Says her shoulder has been \"hurting a lot\" over the weekend, espeially on  when people want to touch and grab her shoulder. Has been trying to use her R UE with ADL's as she can.  Very painful and diffuclt to do. Initial:    6/10Post Session:       6/10  Medications Last Reviewed:  8/8/2022  Updated Objective Findings:    No new measure. Treatment     MANUAL THERAPY: (5 minutes): Positional Release technique to R subscapularis for muscle restriction. THERAPEUTIC EXERCISE: (40 minutes): Initiated UBE or self-assisted motion warm up to R shoulder, level 1 x6'; followed by 2# arm hang with rotation and pendulums x30 total each. Exercise for R shoulder motion with PROM x10 each and gentle physiologic shoulder motions with hold/relax stretch 30\"x3 in supine to abduction, ER in shoulder neutral, 45 , and 60 deg abd, IR stabilized at 45 and 60 deg abd, and forward elevation. Exercises for shoulder muscle activation with manual resisted alternating isometrics in supine 0-deg ER; AAROM place and hold at  deg flexion with respiration and L LQ anterior interior muscle chain facilitation, 5x 3 breath holds. MODALITIES: (15 minutes, un-billable): Cold and compression for post treatment soreness to R shoulder using GameReady, low pressure, 40-deg F, x15' while in sitting UE supported. Good relief with this. HEP: She is to continue with her existing HEP. She verbalizes understanding. [employer] Portal  7/5/22: [id number]    Treatment/Session Summary:    Treatment Assessment: Continues to be pain dominant and irritable to the R shoulder. She is stiff with active guarding. Improved forwrad elevation movement quality in supine with the L lower quarter chain faciiltation during R EU reach. Communication/Consultation:   None  Equipment provided today:  None  Recommendations/Intent for next treatment session: We will continue with motion treatment R shoulder as pain and guarding allow; continue with initial, low level scapular and cuff muscle activation exercises; and modalities for pain as needed.     Total Treatment Billable Duration:  45 minutes  Time In: 1350  Time Out: [address] Val Fox, PT Charge Capture Post Session Pain  PT Visit Info  [facility] Portal  MD Guidelines  Scanned Media  Benefits  MyChart    Future Appointments   Date Time Provider Nader Jameson   8/10/2022  1:45 PM [facility]odo [facility]   8/15/2022 11:15 AM Christine Erwin, PT [facility]   8/18/2022  1:45 PM Loranabella Erwin, PT [facility]   8/22/2022  1:45 PM Loranabella Erwin, PT [facility]   8/25/2022  1:45 PM Loranabella Erwin, [facility]   8/30/2022  1:45 PM Claudia Reina, [facility]   8/30/2022  2:45 PM Amelie Merritt MD [facility] AM[facility]   9/2/2022 11:15 AM Christine Erwin, PT [facility]

## 2022-08-10 ENCOUNTER — HOSPITAL ENCOUNTER (OUTPATIENT)
Dept: PHYSICAL THERAPY | Age: 37
Setting detail: RECURRING SERIES
Discharge: HOME | End: 2022-08-13

## 2022-08-10 PROCEDURE — 97110 THERAPEUTIC EXERCISES: CPT

## 2022-08-10 NOTE — PROGRESS NOTES
Humberto Brown  : 1985  Primary: Chhayaon Italia Allison  Secondary:  [address] @ [address] [address]  Phone: [phone]  Fax: [phone] Plan Frequency: 2x/week (x2 weeks initially with plan to follow 12 weeks)    Plan of Care/Certification Expiration Date: 22      PT Visit Info: Total # of Visits to Date: 15     Visit Count:  14   OUTPATIENT PHYSICAL THERAPY:OP NOTE TYPE: Treatment Note 8/10/2022       Episode  Appt Desk             Treatment Diagnosis:  Pain in Right Shoulder (M25.511)  Stiffness of Right Shoulder, Not elsewhere classified (M25.611)   Strain of muscle(s) and tendon(s) of the rotator cuff of right shoulder, sequela (S46.011S)  Medical/Referring Diagnosis: s/p  Arthroscopy of right shoulder, arthroscopic subacromial decompression, arthroscopic distal clavicle resection, extensive debridement of SLAP tear, biceps labral complex, glenohumeral joint, subacromial space, mini-open rotator cuff repair and biceps tenodesis. Strain of muscle(s) and tendon(s) of the rotator cuff of right shoulder, initial encounter [S46.011A]  Referring Physician:  Osmin Ahn MD MD Orders:  eval & treat, home exercise program and Full Motion, Full Strength; 2x/week 4 weeks (22)  Date of Onset:  Onset Date: 22 (surgery; 22 injury)  Allergies: Duloxetine and Erythromycin  Restrictions/Precautions: Post op restrictions and precautions R shoulder. Interventions Planned (Treatment may consist of any combination of the following):    Current Treatment Recommendations: Strengthening; ROM; Manual Therapy - Soft Tissue Mobilization; Manual Therapy - Joint Manipulation; Pain management; Home exercise program; Modalities     Subjective Comments: Sore after last time. Has been trying to be out of the sling more. Still painful to the shoulder. Has been working on her HEP.   Initial: No VAS      Post Session:          Medications Last Reviewed: 8/10/2022  Updated Objective Findings:    ROM:  PROM RUE (degrees)  R Shoulder Flex  0-180: 135 (forward elevation. Pain.)  R Shoulder ABduction 0-180: 85 (with scapula stabilized)  R Shoulder Int Rotation  0-70: 60 (stabilized at 45 and 60 deg abd)  R Shoulder Ext Rotation  0-90: 45 (in shoulder neutral, 60 at 45 abd, 45 at 80 abd)    Treatment   Active warm up on UBE level 1 x6'. THERAPEUTIC EXERCISE: (50 minutes): Exercises for shoulder motion warm up with 2# arm hang with rotation and pendulums x15-20 total each; standing wand shoulder ext and sitting pulleys in scaption 2x10 each. Exercises for shoulder girdle muscle activation with side-lying guided AAROM scapular abd and add and with light manual resistance in middle and lower trap positions, x10 each;  and attempts at UE lift in middle trap position x3; side-lying ER AAROM/active eccentrics 1x10; supine manual resisted alternating isometrics in supine 0-deg ER; 2x10 each; and AAROM place and hold at  deg flexion with respiration and L LQ anterior interior muscle chain facilitation, 3x 3 breath holds. Added to HEP with seated short arc bilat ER with yellow band, and supine bilat shoulder flex/forward reach self-AAROM. Trial with kinesio taping to R shoulder with deltoid inhibition, mechanical correction to anterior humeral head, and AC joint correction techniques. Instruction in tape wear and removal if skin is irritated. She verbalizes understanding. MODALITIES: (15 minutes, un-billable): Cold and compression for post treatment soreness to R shoulder using GameReady, low pressure, 40-deg F, x15' while in sitting UE supported. Good relief with this. HEP: She is to continue with her existing HEP, and to add as instructed as above. She is to remove the tape in 24 hours. She verbalizes understanding. [employer] Portal  7/5/22: [id number]    Treatment/Session Summary:    Treatment Assessment: Slowly progressing in shoulder PROM. Still stiff. Still quite painful, irritable. Quite weak to the scapular and cuff muscles. Pain limits her. Trial with kinesio tape for support. Communication/Consultation:   None  Equipment provided today:  None  Recommendations/Intent for next treatment session: We will continue with motion treatment R shoulder as pain and guarding allow; continue with initial, low level scapular and cuff muscle activation exercises; and modalities for pain as needed.     Total Treatment Billable Duration: 50h minutes  Time In: 2760  Time Out: Oscar 860, PT       Charge Capture Post Session Pain  PT Visit Info  [employer] Portal  MD Guidelines  Scanned Media  Benefits  MyChart    Future Appointments   Date Time Provider Nader Jameson   8/15/2022 11:15 AM [facility]   8/18/2022  1:45 PM Arlyne Bel, PT [facility]   8/22/2022  1:45 PM Arlyne Bel, PT [facility]   8/25/2022  1:45 PM [facility] Bel[facility]   8/30/2022  1:45 PM [facility]is Teran[facility]   8/30/2022  2:45 PM MD BUBBA Quintana [facility]   9/2/2022 11:15 AM [facility] Bel[facility]

## 2022-08-15 ENCOUNTER — HOSPITAL ENCOUNTER (OUTPATIENT)
Dept: PHYSICAL THERAPY | Age: 37
Setting detail: RECURRING SERIES
Discharge: HOME | End: 2022-08-18

## 2022-08-15 PROCEDURE — 97110 THERAPEUTIC EXERCISES: CPT

## 2022-08-15 NOTE — PROGRESS NOTES
Abelardo Roberts  : 1985  Primary: Segundo [place] Sc  Secondary:  [address] @ [address] [address]  Phone: [phone]  Fax: [phone] Plan Frequency: 2x/week (x2 weeks initially with plan to follow 12 weeks)    Plan of Care/Certification Expiration Date: 22      PT Visit Info: Total # of Visits to Date: 15     Visit Count:  15   OUTPATIENT PHYSICAL THERAPY:OP NOTE TYPE: Treatment Note 8/15/2022       Episode  Appt Desk             Treatment Diagnosis:  Pain in Right Shoulder (M25.511)  Stiffness of Right Shoulder, Not elsewhere classified (M25.611)   Strain of muscle(s) and tendon(s) of the rotator cuff of right shoulder, sequela (S46.011S)  Medical/Referring Diagnosis: s/p  Arthroscopy of right shoulder, arthroscopic subacromial decompression, arthroscopic distal clavicle resection, extensive debridement of SLAP tear, biceps labral complex, glenohumeral joint, subacromial space, mini-open rotator cuff repair and biceps tenodesis. Strain of muscle(s) and tendon(s) of the rotator cuff of right shoulder, initial encounter [S46.011A]  Referring Physician:  Dio Clarke MD MD Orders:  eval & treat, home exercise program and Full Motion, Full Strength; 2x/week 4 weeks (22)  Date of Onset:  Onset Date: 22 (surgery; 22 injury)  Allergies: Duloxetine and Erythromycin  Restrictions/Precautions: Post op restrictions and precautions R shoulder. Interventions Planned (Treatment may consist of any combination of the following):    Current Treatment Recommendations: Strengthening; ROM; Manual Therapy - Soft Tissue Mobilization; Manual Therapy - Joint Manipulation; Pain management; Home exercise program; Modalities     Subjective Comments: Says the tape has been helpful. Still has it on. Pain is better. Has been working her HEP.   Initial: No VAS      Post Session:          Medications Last Reviewed:  8/15/2022  Updated Objective Findings: ROM:  AROM RUE (degrees)  R Shoulder Flexion 0-180: 110 (forward elevation)    Treatment   Active warm up on UBE level 1 x6'. THERAPEUTIC EXERCISE: (45 minutes): Exercises for shoulder motion warm up with 2# arm hang with rotation and pendulums x15-20 each way; standing wand shoulder ext 3x10, and added double hand ext, ext/add, and ext/add/IR with wand for HEP; and sitting pulleys in scaption 2x10. Exercises for shoulder girdle muscle activation with side-lying guided AAROM scapular abd and add and with light manual resistance in middle and lower trap positions, x10 each, then with guided UE lift off 1x5 each; side-lying ER AAROM/active eccentrics 1x10; and added standing single yellow tubing ER and IR with short arc 1x10+ each, standing bilat shoulder ext with yellow 1x15, and trial with standing UE [place] to forward elevation with assist and cues for scapular positioning, 1x5. MODALITIES: (15 minutes, un-billable): Cold and compression for post treatment soreness to R shoulder using GameReady, low pressure, 40-deg F, x15' while in sitting UE supported. Good relief with this. HEP: She is to remove the Kinesio tape by tomorrow morning at the latest. She can continue with her existing HEP. She verbalizes understanding. [employer]  7/5/22: [id number]    Treatment/Session Summary:    Treatment Assessment: Much better active shoulder motions noted today. The Kinesio tape appears to be beneficial.  Communication/Consultation:   None  Equipment provided today:  None  Recommendations/Intent for next treatment session: We will continue with motion treatment R shoulder as pain and guarding allow; continue with initial, low level scapular and cuff muscle activation exercises; and modalities for pain as needed.     Total Treatment Billable Duration: 45 minutes  Time In: 8085  Time Out: [address], PT       Charge Capture Post Session Pain  PT Visit Info  [employer]  MD Guidelines  Scanned Media  Benefits  MyChart    Future Appointments   Date Time Provider Nader Jameson   8/18/2022  1:45 PM Cleaster Finders, PT [facility]   8/22/2022  1:45 PM Cleaster Finders, PT [facility]   8/25/2022  1:45 PM Cleaster Finders, PT [facility]   8/30/2022  1:45 PM [facility]ann[facility] Cushing[facility]   8/30/2022  2:45 PM MD BUBBA Shah AMB   9/2/2022 11:15 AM Cleaster Finders, PT [facility]

## 2022-08-18 ENCOUNTER — HOSPITAL ENCOUNTER (OUTPATIENT)
Dept: PHYSICAL THERAPY | Age: 37
Setting detail: RECURRING SERIES
Discharge: HOME | End: 2022-08-21

## 2022-08-18 PROCEDURE — 97110 THERAPEUTIC EXERCISES: CPT

## 2022-08-18 NOTE — PROGRESS NOTES
Cas Ayala  : 1985  Primary: Darin Allison  Secondary:  [address] @ [address] [address]  Phone: [phone]  Fax: [phone] Plan Frequency: 2x/week (x2 weeks initially with plan to follow 12 weeks)    Plan of Care/Certification Expiration Date: 22      PT Visit Info: Total # of Visits to Date: 12     Visit Count:  16   OUTPATIENT PHYSICAL THERAPY:OP NOTE TYPE: Treatment Note 2022       Episode  Appt Desk             Treatment Diagnosis:  Pain in Right Shoulder (M25.511)  Stiffness of Right Shoulder, Not elsewhere classified (M25.611)   Strain of muscle(s) and tendon(s) of the rotator cuff of right shoulder, sequela (S46.011S)  Medical/Referring Diagnosis: s/p  Arthroscopy of right shoulder, arthroscopic subacromial decompression, arthroscopic distal clavicle resection, extensive debridement of SLAP tear, biceps labral complex, glenohumeral joint, subacromial space, mini-open rotator cuff repair and biceps tenodesis. Strain of muscle(s) and tendon(s) of the rotator cuff of right shoulder, initial encounter [S46.011A]  Referring Physician:  Kevin Choudhury MD MD Orders:  eval & treat, home exercise program and Full Motion, Full Strength; 2x/week 4 weeks (22)  Date of Onset:  Onset Date: 22 (surgery; 22 injury)  Allergies: Duloxetine and Erythromycin  Restrictions/Precautions: Post op restrictions and precautions R shoulder. Interventions Planned (Treatment may consist of any combination of the following):    Current Treatment Recommendations: Strengthening; ROM; Manual Therapy - Soft Tissue Mobilization; Manual Therapy - Joint Manipulation; Pain management; Home exercise program; Modalities     Subjective Comments: Says her shoulder was sore after last time. She removed the kinesio tape on Tuesday as instructed. Has been doing her HEP.   Initial: No VAS      Post Session:          Medications Last Reviewed: modalities for pain as needed.     Total Treatment Billable Duration: 45 minutes  Time In: 0290  Time Out: [address], PT       Charge Capture Post Session Pain  PT Visit Info  [facility] Portal  MD Guidelines  Scanned Media  Benefits  MyChart    Future Appointments   Date Time Provider Nader Gisell   8/22/2022  1:45 PM Barry Schilling, PT [facility]   8/25/2022  1:45 PM Barry Schilling PT [facility]   8/30/2022  1:45 PM Wilfred Bella PTA [facility]   8/30/2022  2:45 PM Perfecto Tatum MD POAP [facility] AMB   9/2/2022 11:15 AM [facility] Schilling, [facility]

## 2022-08-22 ENCOUNTER — HOSPITAL ENCOUNTER (OUTPATIENT)
Dept: PHYSICAL THERAPY | Age: 37
Setting detail: RECURRING SERIES
Discharge: HOME | End: 2022-08-25

## 2022-08-22 PROCEDURE — 97110 THERAPEUTIC EXERCISES: CPT

## 2022-08-22 NOTE — PROGRESS NOTES
Vandana Chaves  : 1985  Primary: Rani Allison  Secondary:  Wilmar Pierre @ [address] [place] [address] Way [address]  Phone: [phone]  Fax: [phone] Plan Frequency: 2x/week (x2 weeks initially with plan to follow 12 weeks)    Plan of Care/Certification Expiration Date: 22      PT Visit Info: Total # of Visits to Date: 16     Visit Count:  17   OUTPATIENT PHYSICAL THERAPY:OP NOTE TYPE: Treatment Note 2022       Episode  Appt Desk             Treatment Diagnosis:  Pain in Right Shoulder (M25.511)  Stiffness of Right Shoulder, Not elsewhere classified (M25.611)   Strain of muscle(s) and tendon(s) of the rotator cuff of right shoulder, sequela (S46.011S)  Medical/Referring Diagnosis: s/p  Arthroscopy of right shoulder, arthroscopic subacromial decompression, arthroscopic distal clavicle resection, extensive debridement of SLAP tear, biceps labral complex, glenohumeral joint, subacromial space, mini-open rotator cuff repair and biceps tenodesis. Strain of muscle(s) and tendon(s) of the rotator cuff of right shoulder, initial encounter [S46.011A]  Referring Physician:  Annette Lentz MD MD Orders:  eval & treat, home exercise program and Full Motion, Full Strength; 2x/week 4 weeks (22)  Date of Onset:  Onset Date: 22 (surgery; 22 injury)  Allergies: Duloxetine and Erythromycin  Restrictions/Precautions: Post op restrictions and precautions R shoulder. Interventions Planned (Treatment may consist of any combination of the following):    Current Treatment Recommendations: Strengthening; ROM; Manual Therapy - Soft Tissue Mobilization; Manual Therapy - Joint Manipulation; Pain management; Home exercise program; Modalities     Subjective Comments: Feels her shoulder is doing better. Able to reach a little better to shampoo her hair. the tape cointinues to feel helpful. Initial: No VAS      Post Session: No VAS.          Medications Last Reviewed: 8/22/2022  Updated Objective Findings:    No new measure. Treatment   Active warm up on UBE level 1 x6'. THERAPEUTIC EXERCISE: (45 minutes): Exercises for shoulder motion warm up with 2# arm hang rotation and pendulums 1x20 each way; standing wand shoulder ext 1x15-20, double hand ext, ext/add, and ext/add/IR with wand 1x10 each; sitting pulleys in scaption, flexion, abd, 1x15-20 each. In supine, Assisted Active Isolated Stretches to ER at 30, 45, and 80 degrees abduction, IR stabilized at 45 and 80 degrees abduction, ER and IR at 90 degrees flexion, abduction, and forward elevation, 3\"x10 each. Exercises for shoulder girdle muscle activation with side-lying guided AAROM forward reach at 90- and 120-deg flexion positions x10 each; scapular abd and add, add and with light manual resistance in middle and lower trap positions, x10 each, then with guided UE lift off 2x5 each to middel trap and 1x5 to lower trap positions; side-lying ER AROM with manual stab of scapula, 2x10; side-lying abd to 90-deg AAROM x5 and place and hold 5x10\"; in supine, bilat ball squeeze and forward reach 2x5; and in standing, AAROM with place and hold/active return forward elevation 1x5. MODALITIES: (15 minutes, un-billable): Cold and compression for post treatment soreness to R shoulder using GameReady, low pressure, 40-deg F, x15' while in sitting UE supported. Good relief with this. HEP: She is to continue with her existing HEP. She verbalizes understanding. [employer]  7/5/22: [id number]    Treatment/Session Summary:    Treatment Assessment: Good effort with the exercises done. Still quite painful, weak and stiff. Pain with active work to the cuff. Some improved activation, pain and weakness with downgraded humeral elevation moves. She is just over 2 months post op. Communication/Consultation:   None  Equipment provided today:  None  Recommendations/Intent for next treatment session: We will continue with motion treatment R shoulder as pain and guarding allow; continue with initial, low level scapular and cuff muscle activation exercises; and modalities for pain as needed.     Total Treatment Billable Duration: 45 minutes  Time In: 0169  Time Out: Oscar 860, PT       Charge Capture Post Session Pain  PT Visit Info  [facility] Portal  MD Guidelines  Scanned Media  Benefits  MyChart    Future Appointments   Date Time Provider Nader Gisell   8/25/2022  1:45 PM Debbi Costello, SHANNEN [facility]   8/30/2022  1:45 PM Raquel Mcgee PTA [facility]   8/30/2022  2:45 PM Adis Whitley MD POH[facility]CTOR [facility]   9/2/2022 11:15 AM Debbi Costello, SHANNEN [facility]

## 2022-08-25 ENCOUNTER — HOSPITAL ENCOUNTER (OUTPATIENT)
Dept: PHYSICAL THERAPY | Age: 37
Setting detail: RECURRING SERIES
Discharge: HOME | End: 2022-08-28

## 2022-08-25 PROCEDURE — 97110 THERAPEUTIC EXERCISES: CPT

## 2022-08-25 NOTE — PROGRESS NOTES
Kierra Daniel  : 1985  Primary: Jordyn Shanemyrtle Allison  Secondary:  [address] @ [address] [address]  Phone: [phone]  Fax: [phone] Plan Frequency: 2x/week (x2 weeks initially with plan to follow 12 weeks)    Plan of Care/Certification Expiration Date: 22      PT Visit Info: Total # of Visits to Date: 25     Visit Count:  18   OUTPATIENT PHYSICAL THERAPY:OP NOTE TYPE: Treatment Note 2022       Episode  Appt Desk             Treatment Diagnosis:  Pain in Right Shoulder (M25.511)  Stiffness of Right Shoulder, Not elsewhere classified (M25.611)   Strain of muscle(s) and tendon(s) of the rotator cuff of right shoulder, sequela (S46.011S)  Medical/Referring Diagnosis: s/p  Arthroscopy of right shoulder, arthroscopic subacromial decompression, arthroscopic distal clavicle resection, extensive debridement of SLAP tear, biceps labral complex, glenohumeral joint, subacromial space, mini-open rotator cuff repair and biceps tenodesis. Strain of muscle(s) and tendon(s) of the rotator cuff of right shoulder, initial encounter [S46.011A]  Referring Physician:  Chavez Hendricks MD MD Orders:  eval & treat, home exercise program and Full Motion, Full Strength; 2x/week 4 weeks (22)  Date of Onset:  Onset Date: 22 (surgery; 22 injury)  Allergies: Duloxetine and Erythromycin  Restrictions/Precautions: Post op restrictions and precautions R shoulder. Interventions Planned (Treatment may consist of any combination of the following):    Current Treatment Recommendations: Strengthening; ROM; Manual Therapy - Soft Tissue Mobilization; Manual Therapy - Joint Manipulation; Pain management; Home exercise program; Modalities     Subjective Comments: Says she has been having increased pain and a \"catching\" sensation to the anteriror humeral head the past couple days. Tape removed on Tuesday. There is some skin irritation noted.  Has been working on her HEP.  Initial: No VAS      Post Session: No VAS. Medications Last Reviewed:  8/25/2022  Updated Objective Findings:    No new measure. Treatment   Active warm up on UBE level 1 x6'. THERAPEUTIC EXERCISE: (45 minutes): Exercises for shoulder motion warm up with 2# arm hang rotation and pendulums 1x20 each way; standing wand shoulder ext 1x15-20, double hand ext, ext/add, and ext/add/IR with wand 1x10 each; sitting pulleys in scaption, flexion, abd, 1x15-20 each. In supine, Assisted Active Isolated Stretches to ER at 30, 45, and 80 degrees abduction, IR stabilized at 45 and 80 degrees abduction, ER and IR at 90 degrees flexion, abduction, and forward elevation, 3\"x10 each. Exercises for shoulder girdle muscle activation with side-lying guided AAROM forward reach at 90- and 120-deg flexion positions x10 each; scapular abd and add, add and with light manual resistance in middle and lower trap positions, x10 each, then with guided UE lift off 2x5 each to middel trap and lower trap positions; side-lying ER AROM with manual stab of scapula, 2x10; side-lying abd to 90-deg AAROM 2x10 and place and hold 3x10\"; in supine, bilat ER with yellow band 1x10, and bilat forward raise with yellow band pull apart 2x3. MODALITIES: (15 minutes, un-billable): Cold and compression for post treatment soreness to R shoulder using GameReady, low pressure, 40-deg F, x15' while in sitting UE supported. Good relief with this. HEP: She is to continue with her existing HEP. She is to respect the pain and may need to back off active exercises to allow recovery. She verbalizes understanding. [employer]  7/5/22: [id number]    Treatment/Session Summary:    Treatment Assessment: Good effort with the exercises done. Pain to the anterior humeral head. Stiff and weak to the shoulder. She is 2 months post op.   Communication/Consultation:   None  Equipment provided today:  None  Recommendations/Intent for next treatment session: We will continue with motion treatment R shoulder as pain and guarding allow; continue with initial, low level scapular and cuff muscle activation exercises; and modalities for pain as needed. She has her next follow up with Dr. Teague Left next week.      Total Treatment Billable Duration: 45 minutes  Time In: 6978  Time Out: [address], PT       Charge Capture Post Session Pain  PT Visit Info  [facility] Portal  MD Guidelines  Scanned Media  Benefits  MyChart    Future Appointments   Date Time Provider Nader Jameson   8/30/2022  1:45 PM Loree Sicard, [facility]   8/30/2022  2:45 PM Jemal Talavera MD POH[facility]CTOR [facility] AMB   9/2/2022 11:15 AM Abby Saavedra, PT [facility]

## 2022-08-30 ENCOUNTER — HOSPITAL ENCOUNTER (OUTPATIENT)
Dept: PHYSICAL THERAPY | Age: 37
Setting detail: RECURRING SERIES
Discharge: HOME | End: 2022-09-02

## 2022-08-30 ENCOUNTER — OFFICE VISIT (OUTPATIENT)
Dept: ORTHOPEDIC SURGERY | Age: 37
End: 2022-08-30

## 2022-08-30 DIAGNOSIS — Z48.89 ENCOUNTER FOR POSTOPERATIVE CARE: Primary | ICD-10-CM

## 2022-08-30 PROCEDURE — 99024 POSTOP FOLLOW-UP VISIT: CPT | Performed by: ORTHOPAEDIC SURGERY

## 2022-08-30 PROCEDURE — 97110 THERAPEUTIC EXERCISES: CPT

## 2022-08-30 NOTE — PROGRESS NOTES
Diaz Stage  : 1985  Primary: Osbaldo French Sc  Secondary:  [address] @ [address] [address]  Phone: [phone]  Fax: [phone] Plan Frequency: 2x/week (x2 weeks initially with plan to follow 12 weeks)    Plan of Care/Certification Expiration Date: 22      PT Visit Info: Total # of Visits to Date: 23     Visit Count:  23   OUTPATIENT PHYSICAL THERAPY:OP NOTE TYPE: Treatment Note 2022       Episode  Appt Desk             Treatment Diagnosis:  Pain in Right Shoulder (M25.511)  Stiffness of Right Shoulder, Not elsewhere classified (M25.611)   Strain of muscle(s) and tendon(s) of the rotator cuff of right shoulder, sequela (S46.011S)  Medical/Referring Diagnosis: s/p  Arthroscopy of right shoulder, arthroscopic subacromial decompression, arthroscopic distal clavicle resection, extensive debridement of SLAP tear, biceps labral complex, glenohumeral joint, subacromial space, mini-open rotator cuff repair and biceps tenodesis. Strain of muscle(s) and tendon(s) of the rotator cuff of right shoulder, initial encounter [S46.011A]  Referring Physician:  Jassi Pedroza MD MD Orders:  eval & treat, home exercise program and Full Motion, Full Strength; 2x/week 4 weeks (22)  Date of Onset:  Onset Date: 22 (surgery; 22 injury)  Allergies: Duloxetine and Erythromycin  Restrictions/Precautions: Post op restrictions and precautions R shoulder. Interventions Planned (Treatment may consist of any combination of the following):    Current Treatment Recommendations: Strengthening; ROM; Manual Therapy - Soft Tissue Mobilization; Manual Therapy - Joint Manipulation; Pain management; Home exercise program; Modalities     Subjective Comments: Pt reports the \"catching\" pain in the anterior shoulder.   Initial: No VAS    (No number give)  Post Session:        (No increase)  Medications Last Reviewed:  2022  Updated Objective Findings:    No new measure. Treatment   Active warm up on UBE level 1 x6'. THERAPEUTIC EXERCISE: (45 minutes): Exercises for shoulder motion warm up with 2# arm hang rotation and pendulums 1x20 each way. In supine, Assisted Active Isolated Stretches to ER at 30, 45, and 80 degrees abduction, IR stabilized at 45 and 80 degrees abduction, ER and IR at 90 degrees flexion, abduction, and forward elevation, 3\"x10 each. Exercises for shoulder girdle muscle activation with side-lying guided AAROM forward reach at 90- and 120-deg flexion positions x10 each; scapular abd and add, add and with light manual resistance in middle and lower trap positions, x10 each, then with guided UE lift off 2x10 each to middel trap and lower trap positions; side-lying ER AROM with manual stab of scapula, 2x10; side-lying abd to 90-deg AAROM 2x10 and place and hold 3x10\"; in supine, bilat ER with yellow band 1x10, and bilat forward raise with yellow band pull apart x10. MODALITIES: (15 minutes, un-billable): Cold and compression for post treatment soreness to R shoulder using GameReady, low pressure, 40-deg F, x15' while in sitting UE supported. Good relief with this. HEP: She is to continue with her existing HEP. She is to respect the pain and may need to back off active exercises to allow recovery. She verbalizes understanding. [employer] Portal  7/5/22: [id number]    Treatment/Session Summary:    Treatment Assessment: Pt is weak with middle and lower trap exercises and she reported increased fatigue. Pt is eager to increase resistance but was advised not to over do the exercises. She reported relief from taping last visit but wasn't able to tape because she has a court hearing. She had mild dryness/ irritation from the tape. Communication/Consultation:   None  Equipment provided today:  None  Recommendations/Intent for next treatment session: We will continue with motion treatment R shoulder as pain and guarding allow; continue with initial, low level scapular and cuff muscle activation exercises; and modalities for pain as needed. She had a follow up with Dr. Dewey Arita today.     Total Treatment Billable Duration: 45 minutes  Time In: 2861  Time Out: 2624    Martha Comment, PTA       Charge Capture Post Session Pain  PT Visit Info  [facility] Portal  MD Guidelines  Scanned Media  Benefits  MyChart    Future Appointments   Date Time Provider Nader Jameson   9/2/2022 11:15 AM Arelia Sorrel Reyesside, PT [facility]   10/11/2022  2:45 PM MD BUBBA Hernandez [facility] AMB

## 2022-08-30 NOTE — PROGRESS NOTES
sign  Neurovascularly intact. The right shoulder has well-healed incisions  Active forward elevation is 0-140  Passive forward elevation is 0-1 70  ER to 40 degrees  In the 90 degree abductor position she has 70 degrees of external and 60 degrees of internal rotation  Biceps has good cosmetic appearance  No erythema  She is neurovascularly intact      Data Reviewed:      Impression:   1. Encounter for postoperative care       status post arthroscopy right shoulder ASD, ADCR, extensive debridement SLAP tear, biceps labral complex, glenohumeral joint, subacromial space, mini open rotator cuff repair and biceps tenodesis 2.5 months  Left shoulder pain  History of right-sided DVT  History of seizure disorder   history of RSD  Throat pain  Back pain  Neck spasm      Plan:   I discussed the plan with the patient. We we will continue physical therapy working on full range of motion and strengthening. I will recheck her back in 6 weeks    Follow up: Return in about 6 weeks (around 10/11/2022).              Talia Stoddard MD

## 2022-09-02 ENCOUNTER — HOSPITAL ENCOUNTER (OUTPATIENT)
Dept: PHYSICAL THERAPY | Age: 37
Setting detail: RECURRING SERIES
Discharge: HOME | End: 2022-09-05

## 2022-09-02 PROCEDURE — 97110 THERAPEUTIC EXERCISES: CPT

## 2022-09-02 NOTE — PROGRESS NOTES
Uriel Splinter  : 1985  Primary: Gabriel García Sc  Secondary:  [address] @ [address] [address]  Phone: [phone]  Fax: [phone] Plan Frequency: 2x/week (x2 weeks initially with plan to follow 12 weeks)    Plan of Care/Certification Expiration Date: 22      PT Visit Info: Total # of Visits to Date: 21      OUTPATIENT PHYSICAL THERAPY:OP NOTE TYPE: Progress Report 2022               Episode  Appt Desk         Treatment Diagnosis:  Pain in Right Shoulder (M25.511)  Stiffness of Right Shoulder, Not elsewhere classified (M25.611)   Strain of muscle(s) and tendon(s) of the rotator cuff of right shoulder, sequela (S46.011S)  Medical/Referring Diagnosis: s/p  Arthroscopy of right shoulder, arthroscopic subacromial decompression, arthroscopic distal clavicle resection, extensive debridement of SLAP tear, biceps labral complex, glenohumeral joint, subacromial space, mini-open rotator cuff repair and biceps tenodesis. Strain of muscle(s) and tendon(s) of the rotator cuff of right shoulder, initial encounter [S46.011A]  Referring Physician:  Rico Flores MD MD Orders:  eval & treat, home exercise program and Full Motion, Full Strength, Aggressive; 2x/week x6 weeks. (22)   Return MD Appt:  10/11/22  Date of Onset:  Onset Date: 22 (surgery; 22 injury)      OBJECTIVE   Observation/Orthostatic Postural Assessment: Walks into the clinic with R UE in sling, no significant distress.    ROM:  AROM RUE (degrees)  R Shoulder Flexion 0-180: 125 (forward elevation)  R Shoulder Int Rotation  0-70: 40 (supported in abduction; to L3 behind back.)  R Shoulder Ext Rotation 0-90: 60 (by side, 75 supported in abduction.)  PROM LUE (degrees)  L Shoulder Flex  0-170: 155 (forward elevation)  L Shoulder ABduction 0-140: 95 (with scapula stabilized)  L Shoulder Int Rotation  0-70: 65 (stabilized at 45 abd, 55 at 80 abd)  L Shoulder Ext Rotation 0-90: 60 (in shoulder neutral, 70 at 45 abd, 75 at 80 abd)  Strength:   L Shoulder: strong with pain to abd, flex, ER>IR, strong and painless to remainder with Soft Tissue Differential testing; 3- against gravity to flex, abd, ER.   ASSESSMENT   Progress Assessment: Ms. Kaela Kumar is now 2.5 months s/p arthroscopy of right shoulder with arthroscopic subacromial decompression, arthroscopic distal clavicle resection, extensive debridement of a type 2 SLAP tear, biceps labral complex, glenohumeral joint, subacromial space, mini-open rotator cuff repair for a 3 cm full-thickness subscapularis and infraspinatus rotator cuff tear, and biceps tenodesis on 6/17/22. She continues to make slow, steady progress. She continues to have pain to the shoulder, especially with active movements. Shoulder ROM is improving. Shoulder strength is improving. The cuff and shoulder girdle are still weak lifting against gravity, but progressing wth the downgraded positions. She appears to be compliant with her HEP, and making progress toward her goals. Problem List: (Impacting functional limitations): Body Structures, Functions, Activity Limitations Requiring Skilled Therapeutic Intervention: Decreased ROM; Decreased tolerance to work activity; Increased pain      Therapy Prognosis:   Therapy Prognosis: Fair (to good due to injury and co-morbidities)     PLAN   Effective Dates: 6/29/22 TO Plan of Care/Certification Expiration Date: 09/27/22     Frequency/Duration: Plan Frequency: 2x/week with plan to follow 12 weeks     Interventions Planned (Treatment may consist of any combination of the following):    Current Treatment Recommendations: ROM; Manual Therapy - Soft Tissue Mobilization; Manual Therapy - Joint Manipulation; Pain management; Home exercise program; Modalities; progressing to Strengthening when appropriate.     Goals: (Goals have been discussed and agreed upon with patient.)  Short-Term Functional Goals: Time Frame: 4-6 weeks  Report no more than 5/10 intermittent pain to R shoulder with compensatory use during basic functional activities, and score less than 50% on the DASH. Progressed, ongoing 9/2/22  R shoulder PROM forward elevation greater than 135 degrees and external rotation greater than 60 degrees to progress into functional ranges. Met 9/2/22  Demonstrate good R shoulder isometric strength with manual testing to progress into strength phase. Met 8/1/22  Independent with initial HEP. Met 8/1/22  Discharge Goals: Time Frame:12 weeks  No more than 3/10 intermittent pain R shoulder with return to normalized household and work-related activities, and score less than 30% on the DASH. Progressed, ongoing 9/2/22  R shoulder AROM forward elevation greater than 135 degrees, external rotation greater than 60 degrees, and strength to shoulder are grossly WNL's for safe use with normalized activities. Progressed, ongoing 9/2/22  Demonstrate good functional shoulder strength and endurance for return to normalized household and work activities. Progressed, ongoing 9/2/22   Independent with advanced shoulder HEP for continued self-management. Progressed, ongoing 9/2/22       Medical Necessity:   She is 2.5 months post op R shoulder. Impairments listed above are limiting comfort and basic functional use her R/dominant UE. She is progressing. Reason For Services/Other Comments:  Patient continues to require skilled intervention due to the complexity fo the surgical procedure and need for safe, progressive rehab to return to functional use of her R/dominant UE. Co-morbid cervical pain, strain and L shoulder pain and cuff pathology and history of CRPS to B LE will mostly likely make progress slower.      Ashish Parra, PT, MSPT, OCS       Post Session Pain  Charge Capture  PT Visit Info  POC Link  Treatment Note Link  MD Guidelines  [place]

## 2022-09-02 NOTE — PROGRESS NOTES
Audrey Blackburn  : 1985  Primary: Darwin Allison  Secondary:  [address] @ [address] [address]  Phone: [phone]  Fax: [phone] Plan Frequency: 2x/week (x2 weeks initially with plan to follow 12 weeks)    Plan of Care/Certification Expiration Date: 22      PT Visit Info: Total # of Visits to Date: 21     Visit Count:  20   OUTPATIENT PHYSICAL THERAPY:OP NOTE TYPE: Treatment Note 2022       Episode  Appt Desk             Treatment Diagnosis:  Pain in Right Shoulder (M25.511)  Stiffness of Right Shoulder, Not elsewhere classified (M25.611)   Strain of muscle(s) and tendon(s) of the rotator cuff of right shoulder, sequela (S46.011S)  Medical/Referring Diagnosis: s/p  Arthroscopy of right shoulder, arthroscopic subacromial decompression, arthroscopic distal clavicle resection, extensive debridement of SLAP tear, biceps labral complex, glenohumeral joint, subacromial space, mini-open rotator cuff repair and biceps tenodesis. Strain of muscle(s) and tendon(s) of the rotator cuff of right shoulder, initial encounter [S46.011A]  Referring Physician:  Deven Briceño MD MD Orders:   eval & treat, home exercise program and Full Motion, Full Strength, Aggressive; 2x/week x6 weeks. (22)   Date of Onset:  Onset Date: 22 (surgery; 22 injury)  Allergies: Duloxetine and Erythromycin  Restrictions/Precautions: Post op restrictions and precautions R shoulder. Interventions Planned (Treatment may consist of any combination of the following):    Current Treatment Recommendations: Strengthening; ROM; Manual Therapy - Soft Tissue Mobilization; Manual Therapy - Joint Manipulation; Pain management; Home exercise program; Modalities     Subjective Comments: Reports having her follow up with Dr Víctor Cortez on Wednesday. He said she is progressing and wants her to continue with PT. He provided a new order to continue.  She is to return to him in 6 1x15, side-lying abd to 90-deg AROM 1x10, and standing scaption AAROM with active eccentrics 1x10. MODALITIES: (15 minutes, un-billable): Cold and compression for post treatment soreness to R shoulder using GameReady, low pressure, 40-deg F, x15' while in sitting UE supported. Good relief with this. HEP: She is to continue with her existing HEP. She verbalizes understanding. [employer] Portal  7/5/22: [id number]    Treatment/Session Summary:    Treatment Assessment: She is improving with R shoulder ROM and active strength. The cuff and shoulder girdle are still weak lifting against gravity, but progressing wth the downgraded positions. She appears to be compliatn with her HEP. Pain persists. She is now 2.5 months post op. Communication/Consultation:   None  Equipment provided today:  None  Recommendations/Intent for next treatment session: We will continue to progress R shoulder and re-strengthening as pain allows. Continue with modalities as needed. Update HEP as appropriate.    Total Treatment Billable Duration: 45 minutes  Time In: 5666  Time Out: 3 [facility], PT       Charge Capture Post Session Pain  PT Visit Info  [employer] Portal  MD Guidelines  Scanned Media  Benefits  MyChart    Future Appointments   Date Time Provider Nader Jameson   9/7/2022  3:15 PM Leandra Bars, PTA [facility]   9/9/2022 12:00 PM Yuri Celestina, PT [facility]   9/13/2022  1:45 PM Leandra Bars, PTA [facility]   9/15/2022  1:45 PM Yuri Celestina, PT [facility]   9/20/2022  1:45 PM Leandra Bars, PTA [facility]   9/22/2022  3:15 PM Yuri Celestina, PT [facility]   9/27/2022  1:45 PM Leandra Bars, PTA [facility]   9/29/2022  2:30 PM Yuri Celestina, PT [facility]   10/4/2022  1:45 PM Leandra Bars, PTA [facility]   10/6/2022  2:30 PM Yuri Celestina, PT [facility]   10/11/2022  1:45 PM Leandra Bars, PTA [facility]   10/11/2022  2:45 PM Grisel Arguello MD BUB[facility]   10/13/2022  2:30 PM Zachary Esparza, PT [facility]

## 2022-09-07 ENCOUNTER — HOSPITAL ENCOUNTER (OUTPATIENT)
Dept: PHYSICAL THERAPY | Age: 37
Setting detail: RECURRING SERIES
Discharge: HOME | End: 2022-09-10

## 2022-09-07 PROCEDURE — 97110 THERAPEUTIC EXERCISES: CPT

## 2022-09-09 ENCOUNTER — HOSPITAL ENCOUNTER (OUTPATIENT)
Dept: PHYSICAL THERAPY | Age: 37
Setting detail: RECURRING SERIES
Discharge: HOME | End: 2022-09-12

## 2022-09-09 PROCEDURE — 97110 THERAPEUTIC EXERCISES: CPT

## 2022-09-09 ASSESSMENT — PAIN SCALES - GENERAL: PAINLEVEL_OUTOF10: 3

## 2022-09-09 NOTE — PROGRESS NOTES
Felipe Campa  : 1985  Primary: Kaylee Junior Sc  Secondary:  [address] @ [address] [address]  Phone: [phone]  Fax: [phone] Plan Frequency: 2x/week (x2 weeks initially with plan to follow 12 weeks)    Plan of Care/Certification Expiration Date: 22      PT Visit Info: Total # of Visits to Date: 25     Visit Count:  25   OUTPATIENT PHYSICAL THERAPY:OP NOTE TYPE: Treatment Note 2022       Episode  Appt Desk             Treatment Diagnosis:  Pain in Right Shoulder (M25.511)  Stiffness of Right Shoulder, Not elsewhere classified (M25.611)   Strain of muscle(s) and tendon(s) of the rotator cuff of right shoulder, sequela (S46.011S)  Medical/Referring Diagnosis: s/p  Arthroscopy of right shoulder, arthroscopic subacromial decompression, arthroscopic distal clavicle resection, extensive debridement of SLAP tear, biceps labral complex, glenohumeral joint, subacromial space, mini-open rotator cuff repair and biceps tenodesis. Strain of muscle(s) and tendon(s) of the rotator cuff of right shoulder, initial encounter [S46.011A]  Referring Physician:  Claudio Page MD MD Orders:   eval & treat, home exercise program and Full Motion, Full Strength, Aggressive; 2x/week x6 weeks. (22)   Date of Onset:  Onset Date: 22 (surgery; 22 injury)  Allergies: Duloxetine and Erythromycin  Restrictions/Precautions: Post op restrictions and precautions R shoulder. Interventions Planned (Treatment may consist of any combination of the following):    Current Treatment Recommendations: Strengthening; ROM; Manual Therapy - Soft Tissue Mobilization; Manual Therapy - Joint Manipulation; Pain management; Home exercise program; Modalities     Subjective Comments: Pt reports mild shoulder soreness.   Initial:    3/10  Post Session:       3/10 R shoulder  Medications Last Reviewed:  2022  Updated Objective Findings:    ROM:     Strength:   L Shoulder: strong with pain to abd, flex, ER>IR, strong and painless to remainder with Soft Tissue Differential testing; 3- against gravity to flex, abd, ER. Treatment   Active warm up on UBE level 1 x6', then pulleys 3 ways x15-20 each, and standing wand ext/add/IR in front of the mirror. THERAPEUTIC EXERCISE: (45 minutes): Exercises to increased R shoulder AROM and strength. In supine, Assisted Active Isolated Stretches to ER at 30, 45, and 80 degrees abduction, IR stabilized at 45 and 80 degrees abduction, ER and IR at 90 degrees flexion, abduction, and forward elevation, 3\"x10 each. Exercises for shoulder girdle muscle activation, initial strength with side-lying guided UE lift off 2x10 each to middle trap and lower trap positions; side-lying ER AROM with manual stab of scapula, 1x15, then x15 with 1#. Side lying middle trap with 1# x15. Side lying abduction 3x10. MODALITIES: (15 minutes, un-billable): Cold and compression for post treatment soreness to R shoulder using GameReady, low pressure, 40-deg F, x15' while in sitting UE supported. Good relief with this. HEP: She is to continue with her existing HEP. She verbalizes understanding. [employer] Portal  7/5/22: [id number]    Treatment/Session Summary:    Treatment Assessment: Pt has improved PROM and she did not report increased pain with exercise. Communication/Consultation:   None  Equipment provided today:  None  Recommendations/Intent for next treatment session: We will continue to progress R shoulder and re-strengthening as pain allows. Continue with modalities as needed. Update HEP as appropriate.    Total Treatment Billable Duration: 45 minutes + 15 min unbilled  Time In: 6812  Time Out: 1115    Spherix Shed, PTA       Charge Capture Post Session Pain  PT Visit Info  [employer] Portal  MD [address]    Future Appointments   Date Time Provider Nader Jameson   9/13/2022  1:45 PM Jewel Shed,

## 2022-09-13 ENCOUNTER — HOSPITAL ENCOUNTER (OUTPATIENT)
Dept: PHYSICAL THERAPY | Age: 37
Setting detail: RECURRING SERIES
Discharge: HOME | End: 2022-09-16

## 2022-09-13 PROCEDURE — 97110 THERAPEUTIC EXERCISES: CPT

## 2022-09-13 ASSESSMENT — PAIN SCALES - GENERAL: PAINLEVEL_OUTOF10: 5
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
## 2022-09-15 ENCOUNTER — HOSPITAL ENCOUNTER (OUTPATIENT)
Dept: PHYSICAL THERAPY | Age: 37
Setting detail: RECURRING SERIES
Discharge: HOME | End: 2022-09-18

## 2022-09-15 PROCEDURE — 97110 THERAPEUTIC EXERCISES: CPT

## 2022-09-15 PROCEDURE — 97140 MANUAL THERAPY 1/> REGIONS: CPT

## 2022-09-15 ASSESSMENT — PAIN SCALES - GENERAL: PAINLEVEL_OUTOF10: 5
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
## 2022-09-20 ENCOUNTER — HOSPITAL ENCOUNTER (OUTPATIENT)
Dept: PHYSICAL THERAPY | Age: 37
Setting detail: RECURRING SERIES
Discharge: HOME | End: 2022-09-23

## 2022-09-20 PROCEDURE — 97110 THERAPEUTIC EXERCISES: CPT

## 2022-09-20 ASSESSMENT — PAIN SCALES - GENERAL: PAINLEVEL_OUTOF10: 5

## 2022-09-20 NOTE — PROGRESS NOTES
Uriel Splinter  : 1985  Primary: Gabriel García Sc  Secondary:  [address] @ [address] [address]  Phone: [phone]  Fax: [phone] Plan Frequency: 2x/week (x2 weeks initially with plan to follow 12 weeks)    Plan of Care/Certification Expiration Date: 22      PT Visit Info: Total # of Visits to Date: 25     Visit Count:  25   OUTPATIENT PHYSICAL THERAPY:OP NOTE TYPE: Treatment Note 2022       Episode  Appt Desk             Treatment Diagnosis:  Pain in Right Shoulder (M25.511)  Stiffness of Right Shoulder, Not elsewhere classified (M25.611)   Strain of muscle(s) and tendon(s) of the rotator cuff of right shoulder, sequela (S46.011S)  Medical/Referring Diagnosis: s/p  Arthroscopy of right shoulder, arthroscopic subacromial decompression, arthroscopic distal clavicle resection, extensive debridement of SLAP tear, biceps labral complex, glenohumeral joint, subacromial space, mini-open rotator cuff repair and biceps tenodesis. Strain of muscle(s) and tendon(s) of the rotator cuff of right shoulder, initial encounter [S46.011A]  Referring Physician:  Rico Flores MD MD Orders:   eval & treat, home exercise program and Full Motion, Full Strength, Aggressive; 2x/week x6 weeks. (22)   Date of Onset:  Onset Date: 22 (surgery; 22 injury)  Allergies: Duloxetine and Erythromycin  Restrictions/Precautions: Post op restrictions and precautions R shoulder. Interventions Planned (Treatment may consist of any combination of the following):    Current Treatment Recommendations: Strengthening; ROM; Manual Therapy - Soft Tissue Mobilization; Manual Therapy - Joint Manipulation; Pain management; Home exercise program; Modalities     Subjective Comments: Pt reports mild soreness. Initial:    5-6/10  Post Session:       /10  Medications Last Reviewed:  2022  Updated Objective Findings:    No new measure.    Treatment   Active warm up on UBE level 1 x10', rpm 40-50 range. THERAPEUTIC EXERCISE: (40 minutes): Exercises for R shoulder motion in supine with Assisted Active Isolated Stretches to ER at 30, 45, and 80 degrees abduction, IR stabilized at 45 and 80 degrees abduction, ER and IR at 90 degrees flexion, abduction, and forward elevation, 3\"x10 each. Exercises for shoulder girdle strength and active motion with guided AAROM to AROM for horiz abd/middle trap, scaption/lower trap, side lying ER with 1# and side lying shoulder abduction 1#, 3x10 each. rep count to each with emphasis on scapulohumeral mechanics and control of movement vs reps. MODALITIES: (15 minutes, un-billable): Cold and compression for post treatment soreness to R shoulder using GameReady, low pressure, 40-deg F, x15' while in sitting UE supported. Good relief with this. HEP: She is to allow a little recovery the rest of today. She can continue with her existing HEP over the weekend as she feels able. She verbalizes understanding. [employer] Portal  7/5/22: [id number]    Treatment/Session Summary:    Treatment Assessment: Pt had much less restiction with PROM today and she did not report increased pain with activity. She requires tactile cues to perform exercises with correct body mechanics. Communication/Consultation:   None  Equipment provided today:  None  Recommendations/Intent for next treatment session: We will continue to progress R shoulder and re-strengthening as pain allows. Continue with modalities as needed. Update HEP as appropriate.    Total Treatment Billable Duration: 40 minutes   Time In: 8234  Time Out: 5608    Ibis Morales PTA       Charge Capture Post Session Pain  PT Visit Info  [employer] Portal  MD Guidelines  Scanned Media  Benefits  MyChart    Future Appointments   Date Time Provider Nader Jameson   9/22/2022  3:15 PM Fiona Castaneda, PT [facility]   9/27/2022  1:45 PM Ibis Morales PTA [facility]   9/29/2022  2:30 PM Vessie Red, PT [facility]   10/4/2022  1:45 PM Laymon Loron, PTA [facility]   10/6/2022  2:30 PM Vessie Red, PT [facility]   10/11/2022  1:45 PM Laymon Loron, PTA [facility]   10/11/2022  2:45 PM Ignacio Louis, MD [facility]   10/13/2022  2:30 PM Vessie Red, PT [facility]   10/17/2022  1:45 PM Vessie Red, PT [facility]   10/20/2022  1:45 PM Laymon Loron, PTA [facility]   10/24/2022  1:45 PM Vessie Red, PT [facility]   10/27/2022  1:45 PM Laymon Loron, PTA [facility]   10/31/2022  1:45 PM Laymon Loron, PTA [facility]   11/3/2022  1:45 PM Vessie Red, PT [facility]

## 2022-09-22 ENCOUNTER — HOSPITAL ENCOUNTER (OUTPATIENT)
Dept: PHYSICAL THERAPY | Age: 37
Setting detail: RECURRING SERIES
Discharge: HOME | End: 2022-09-25

## 2022-09-22 PROCEDURE — 97110 THERAPEUTIC EXERCISES: CPT

## 2022-09-22 ASSESSMENT — PAIN SCALES - GENERAL: PAINLEVEL_OUTOF10: 5

## 2022-09-22 NOTE — PROGRESS NOTES
Kary Villarreal  : 1985  Primary: Allyssa Going Sc  Secondary:  [address] @ [address] [address]  Phone: [phone]  Fax: [phone] Plan Frequency: 2x/week (x2 weeks initially with plan to follow 12 weeks)    Plan of Care/Certification Expiration Date: 22      PT Visit Info: Total # of Visits to Date: 32     Visit Count:  26   OUTPATIENT PHYSICAL THERAPY:OP NOTE TYPE: Treatment Note 2022       Episode  Appt Desk             Treatment Diagnosis:  Pain in Right Shoulder (M25.511)  Stiffness of Right Shoulder, Not elsewhere classified (M25.611)   Strain of muscle(s) and tendon(s) of the rotator cuff of right shoulder, sequela (S46.011S)  Medical/Referring Diagnosis: s/p  Arthroscopy of right shoulder, arthroscopic subacromial decompression, arthroscopic distal clavicle resection, extensive debridement of SLAP tear, biceps labral complex, glenohumeral joint, subacromial space, mini-open rotator cuff repair and biceps tenodesis. Strain of muscle(s) and tendon(s) of the rotator cuff of right shoulder, initial encounter [S46.011A]  Referring Physician:  Kaleb Harvey MD MD Orders:   eval & treat, home exercise program and Full Motion, Full Strength, Aggressive; 2x/week x6 weeks. (22)   Date of Onset:  Onset Date: 22 (surgery; 22 injury)  Allergies: Duloxetine and Erythromycin  Restrictions/Precautions: Post op restrictions and precautions R shoulder. Interventions Planned (Treatment may consist of any combination of the following):    Current Treatment Recommendations: Strengthening; ROM; Manual Therapy - Soft Tissue Mobilization; Manual Therapy - Joint Manipulation; Pain management; Home exercise program; Modalities     Subjective Comments: Says she had a rough day yesterday wtih pain so did not do her HEP. Still painful today. Not taking her muscle relaxors or pain meds any longer.   Initial:    5/10  Post Session: Medications Last Reviewed: Naproxen; gabapen 9/22/2022  Updated Objective Findings:    ROM:  PROM RUE (degrees)  R Shoulder Flex  0-180: 160 (forward elevation)  R Shoulder ABduction 0-180: 95 (with scapula stabilized)  R Shoulder Int Rotation  0-70: 70 (stabilized at 45 abd, 65 at 90 abd)  R Shoulder Ext Rotation  0-90: 75 (in shoulder neutral, 90 at 45 and 90 abd)  AROM RUE (degrees)  R Shoulder Flexion 0-180: 150 (forward elevation)  Treatment   Active warm up on UBE level 1 x10', rpm 40-50 range. THERAPEUTIC EXERCISE: (40 minutes): Positional Release to R upper trap, pec major, subscapularis for muscle restrictions to motion. Exercises for R shoulder motion in supine with Assisted Active Isolated Stretches to ER at 30, 45, and 80 degrees abduction, IR stabilized at 45 and 80 degrees abduction, ER and IR at 90 degrees flexion, abduction, forward elevation, horiz abd/add, D1 and D2 flexion, 3\"x10 each. Exercises for shoulder girdle humeral elevation mechanics in standing with manual resisted serratus push with active elevation 3x10. MODALITIES: (15 minutes, un-billable): Cold and compression for post treatment soreness to R shoulder using GameReady, low pressure, 40-deg F, x15' while in sitting UE supported. Good relief with this. HEP: She can continue with her HEP as she feels able over the weekend. She verbalizes understanding. [employer] Portal  7/5/22: [id number]    Treatment/Session Summary:    Treatment Assessment: Continues with pain to the shoulder so less overall muscle work load again today. Good shoulder ROM. She is improving with active humeral elevation. Communication/Consultation:   None  Equipment provided today:  None  Recommendations/Intent for next treatment session: We will continue to progress R shoulder and re-strengthening as pain allows. Continue with modalities as needed. Update HEP as appropriate.    Total Treatment Billable Duration: 40 minutes   Time In: 6546  Time Out: [address], PT       Charge Capture Post Session Pain  PT Visit Info  [employer] Portal  MD Guidelines  Scanned Media  Benefits  MyChart    Future Appointments   Date Time Provider Nader Gisell   9/27/2022  1:45 PM Carlo Ropes, [facility]   9/29/2022  2:30 PM Jassi Lala, PT [facility]   10/4/2022  1:45 PM Carlo Ropes, PTA [facility]   10/6/2022  2:30 PM Jassi Lala, PT [facility]   10/11/2022  1:45 PM Carlo Ropes, PTA [facility]   10/11/2022  2:45 PM Franci Young MD [facility]   10/13/2022  2:30 PM Jassi Lala, PT [facility]   10/17/2022  1:45 PM Jassi Lala, PT [facility]   10/20/2022  1:45 PM Carlo Ropes, PTA [facility]   10/24/2022  1:45 PM Gaileandrez Lala, PT [facility]   10/27/2022  1:45 PM Carlo Ropes, PTA [facility]   10/31/2022  1:45 PM Carlo Ropes, PTA [facility]   11/3/2022  1:45 PM Jassi Lala, PT [facility]

## 2022-09-27 ENCOUNTER — HOSPITAL ENCOUNTER (OUTPATIENT)
Dept: PHYSICAL THERAPY | Age: 37
Setting detail: RECURRING SERIES
Discharge: HOME | End: 2022-09-30

## 2022-09-27 PROCEDURE — 97110 THERAPEUTIC EXERCISES: CPT

## 2022-09-27 ASSESSMENT — PAIN SCALES - GENERAL: PAINLEVEL_OUTOF10: 7

## 2022-09-27 NOTE — PROGRESS NOTES
Makayla lAberto  : 1985  Primary: Baudilio Allison  Secondary:  [address] @ [address] [address]  Phone: [phone]  Fax: [phone] Plan Frequency: 2x/week (x2 weeks initially with plan to follow 12 weeks)    Plan of Care/Certification Expiration Date: 22      PT Visit Info: Total # of Visits to Date: 32     Visit Count:  27   OUTPATIENT PHYSICAL THERAPY:OP NOTE TYPE: Treatment Note 2022       Episode  Appt Desk             Treatment Diagnosis:  Pain in Right Shoulder (M25.511)  Stiffness of Right Shoulder, Not elsewhere classified (M25.611)   Strain of muscle(s) and tendon(s) of the rotator cuff of right shoulder, sequela (S46.011S)  Medical/Referring Diagnosis: s/p  Arthroscopy of right shoulder, arthroscopic subacromial decompression, arthroscopic distal clavicle resection, extensive debridement of SLAP tear, biceps labral complex, glenohumeral joint, subacromial space, mini-open rotator cuff repair and biceps tenodesis. Strain of muscle(s) and tendon(s) of the rotator cuff of right shoulder, initial encounter [S46.011A]  Referring Physician:  Stacie Bush MD MD Orders:   eval & treat, home exercise program and Full Motion, Full Strength, Aggressive; 2x/week x6 weeks. (22)   Date of Onset:  Onset Date: 22 (surgery; 22 injury)  Allergies: Duloxetine and Erythromycin  Restrictions/Precautions: Post op restrictions and precautions R shoulder. Interventions Planned (Treatment may consist of any combination of the following):    Current Treatment Recommendations: Strengthening; ROM; Manual Therapy - Soft Tissue Mobilization; Manual Therapy - Joint Manipulation; Pain management; Home exercise program; Modalities     Subjective Comments: Pt reports more pain today and states that she took a muscle relaxer prior to PT today.   Initial:    7/10  Post Session:        no increase  Medications Last Reviewed: Naproxen; gabapen 9/27/2022  Updated Objective Findings:    ROM:     Treatment   Active warm up on UBE level 1 x10', rpm 40-50 range. THERAPEUTIC EXERCISE: (40 minutes):  Exercises for R shoulder motion in supine with Assisted Active Isolated Stretches to ER at 30, 45, and 80 degrees abduction, IR stabilized at 45 and 80 degrees abduction, ER and IR at 90 degrees, flexion, abduction, forward elevation, horiz abd/add, D1 and D2 flexion, 3\"x10 each. The scapula was stabilized for all motions. Standing against wall- active assisted flexion 2x10, and ER at 0 degrees of ABD 2x10. Wall wash into flexion and horizontal ADD/ABD x20 each. MODALITIES: (15 minutes, un-billable): Cold and compression for post treatment soreness to R shoulder using GameReady, low pressure, 40-deg F, x15' while in sitting UE supported. Good relief with this. HEP: She can continue with her HEP as she feels able over the weekend. She verbalizes understanding. [employer] Portal  7/5/22: [id number]    Treatment/Session Summary:    Treatment Assessment:   Pt has almost full PROM and her active flexion has also improved. Pt requires intermittent tactile cues to depress the R scapula. She remains tight and limited with active external rotation. No reports of increased pain. Communication/Consultation:   None  Equipment provided today:  None  Recommendations/Intent for next treatment session: We will continue to progress R shoulder and re-strengthening as pain allows. Continue with modalities as needed. Update HEP as appropriate.    Total Treatment Billable Duration: 40 minutes   Time In: 0355  Time Out: [address], hospitals       Charge Capture Post Session Pain  PT Visit Info  [employer] Portal  MD Guidelines  Scanned Media  Benefits  MyChart    Future Appointments   Date Time Provider Nader Jameson   9/29/2022  2:30 PM [address] [place] [address], PT [facility]   10/4/2022  1:45 PM Marlee Foss PTA [facility]   10/6/2022  2:30 PM Citlali Drake Lauren, PT [facility]   10/11/2022  1:45 PM Gearjose g Tunisian, PTA [facility]   10/11/2022  2:45 PM MD BUBBA Reyes [facility]   10/13/2022  2:30 PM [facility]   10/17/2022  1:45 PM [facility] Wendy[facility]   10/20/2022  1:45 PM Gearold Tunisian, [facility]   10/24/2022  1:45 PM [facility]ia Wendy[facility]   10/27/2022  1:45 PM Gearold Tunisian, [facility]   10/31/2022  1:45 PM Gearold Tunisian, [facility]   11/3/2022  1:45 PM [facility]

## 2022-09-29 ENCOUNTER — HOSPITAL ENCOUNTER (OUTPATIENT)
Dept: PHYSICAL THERAPY | Age: 37
Setting detail: RECURRING SERIES
Discharge: HOME | End: 2022-10-02

## 2022-09-29 PROCEDURE — 97110 THERAPEUTIC EXERCISES: CPT
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
## 2022-09-29 NOTE — PROGRESS NOTES
Warden Nunez  : 1985  Primary: Darling Tuyet Allison  Secondary:  [address] @ [address] [address]  Phone: [phone]  Fax: [phone] Plan Frequency: 2x/week (x2 weeks initially with plan to follow 12 weeks)    Plan of Care/Certification Expiration Date: 22      PT Visit Info: Total # of Visits to Date:      Visit Count:  29   OUTPATIENT PHYSICAL THERAPY:OP NOTE TYPE: Treatment Note 2022       Episode  Appt Desk             Treatment Diagnosis:  Pain in Right Shoulder (M25.511)  Stiffness of Right Shoulder, Not elsewhere classified (M25.611)   Strain of muscle(s) and tendon(s) of the rotator cuff of right shoulder, sequela (S46.011S)  Medical/Referring Diagnosis: s/p  Arthroscopy of right shoulder, arthroscopic subacromial decompression, arthroscopic distal clavicle resection, extensive debridement of SLAP tear, biceps labral complex, glenohumeral joint, subacromial space, mini-open rotator cuff repair and biceps tenodesis. Strain of muscle(s) and tendon(s) of the rotator cuff of right shoulder, initial encounter [S46.011A]  Referring Physician:  Neva Mi MD MD Orders:   eval & treat, home exercise program and Full Motion, Full Strength, Aggressive; 2x/week x6 weeks. (22)   Date of Onset:  Onset Date: 22 (surgery; 22 injury)  Allergies: Duloxetine and Erythromycin  Restrictions/Precautions: Post op restrictions and precautions R shoulder. Interventions Planned (Treatment may consist of any combination of the following):    Current Treatment Recommendations: Strengthening; ROM; Manual Therapy - Soft Tissue Mobilization; Manual Therapy - Joint Manipulation; Pain management; Home exercise program; Modalities     Subjective Comments: Says her shoulder is sore today. Feels the weather coming in affects her pain. Feels her shoulder is doing better overall. Has been working hard on her HEP.   Initial:     /10  Post Session: Medications Last Reviewed: Naproxen; gabapen 9/29/2022  Updated Objective Findings:    ROM:  PROM RUE (degrees)  R Shoulder Flex  0-180: 160 (forward elevation)  R Shoulder ABduction 0-180: 95 (with scapula stabilized)  R Shoulder Int Rotation  0-70: 70 (stabilized in shoulder neutral, 60 at 80-abd)  R Shoulder Ext Rotation  0-90: 70 (in shoulder neutral, 80 at 45 abd, 90 at 80 abd)  AROM RUE (degrees)  R Shoulder Flexion 0-180: 150 (forward elevation)  R Shoulder Int Rotation  0-70: 55 (in abduction; to T10 behind back)  R Shoulder Ext Rotation 0-90: 60 (by side, 80 at 80 abd)  Strength:   R Shoulder: mild weakness to abd, ER, and strong and painless to remainder with Soft Tissue Differential testing; 4 to 4+ to R shoulder with positional manual testing, supraspinatus/Empty can and ER. Treatment   Active warm up on UBE level 1 x10', rpm 40-50 range. THERAPEUTIC EXERCISE: (40 minutes):  Exercises for R shoulder motion with 4 way wall slides x15 each; active reciprocal inhibition end range motion with shoulder 90/90 active ER with forearm on wall 3\"x10, and full scaption UE lift off 3\"x10; back to wall assisted Active Isolated stretch to ER by side and supported at 90 deg abd, IR supported in 90-deg ER, and forward elevation, 3\"x10 each with cues for spine and rib cage alignment; standing assisted Active Isolated Stretch to shoulder ext and ext/ADD/IR behind back, 3\"x10; in supine with Assisted Active Isolated Stretches to ER at 30, 45, and 80 degrees abduction, IR stabilized at 45 and 80 degrees abduction, ER and IR at 90 degrees, flexion, abduction, forward elevation, horiz abd/add, 3\"x10 each. Exercises for shoulder strength with prone unilat shoulder ext, Horiz abd, and scaption 1# 2x10 each; prone ER and IR 1# 2x10 each; standing scaption to 90-deg 1# 2x10.    MODALITIES: (15 minutes, un-billable): Cold and compression for post treatment soreness to R shoulder using GameReady, low pressure, 40-deg F, x15' while in sitting UE supported. Good relief with this. HEP: She can continue with her existing HEP. She verbalizes understanding. [employer] Portal  7/5/22: [id number]    Treatment/Session Summary:    Treatment Assessment: She is 3 months post op. She continues to make steady progress. Pain persists. There is mild stiffness and cuff weakness limiting normal use of her R UE. But these are making gains. She is progressing toward her goals. Communication/Consultation:   None  Equipment provided today:  None  Recommendations/Intent for next treatment session: We will continue to progress R shoulder motion and re-strengthening as pain allows. Continue with modalities as needed. Update HEP as appropriate.    Total Treatment Billable Duration: 40 minutes   Time In: 2340  Time Out: [address], PT       Charge Capture Post Session Pain  PT Visit Info  [employer] Portal  MD Guidelines  Scanned Media  Benefits  MyChart    Future Appointments   Date Time Provider Nader Jameson   10/4/2022  1:45 PM Gearold Togolese, PTA [facility]   10/6/2022  2:30 PM Alvenia Wendy, PT [facility]   10/11/2022  1:45 PM Gearold Togolese, PTA [facility]   10/11/2022  2:45 PM Warden Svitlana MD [facility]   10/13/2022  2:30 PM Alvenia Wendy, PT [facility]   10/17/2022  1:45 PM Alvenia Wendy, PT [facility]   10/20/2022  1:45 PM Gearold Togolese, PTA [facility]   10/24/2022  1:45 PM Alvenia Wendy, PT [facility]   10/27/2022  1:45 PM Gearold Togolese, PTA [facility]   10/31/2022  1:45 PM Gearold Togolese, PTA [facility]   11/3/2022  1:45 PM Alvenia Wendy, PT [facility]

## 2022-10-04 ENCOUNTER — HOSPITAL ENCOUNTER (OUTPATIENT)
Dept: PHYSICAL THERAPY | Age: 37
Setting detail: RECURRING SERIES
Discharge: HOME | End: 2022-10-07
Payer: COMMERCIAL

## 2022-10-04 PROCEDURE — 97110 THERAPEUTIC EXERCISES: CPT

## 2022-10-04 ASSESSMENT — PAIN SCALES - GENERAL: PAINLEVEL_OUTOF10: 3

## 2022-10-04 NOTE — PROGRESS NOTES
Kimmy Santillan  : 1985  Primary: Robert Prairie Sc  Secondary:  Lupis Vargas @ [address] [place] [address] [address] [address]  Phone: [phone]  Fax: [phone] Plan Frequency: 2x/week for an additional 12 weeks    Plan of Care/Certification Expiration Date: 22      PT Visit Info: Total # of Visits to Date: 34     Visit Count:  34   OUTPATIENT PHYSICAL THERAPY:OP NOTE TYPE: Treatment Note 10/4/2022       Episode  Appt Desk             Treatment Diagnosis:  Pain in Right Shoulder (M25.511)  Stiffness of Right Shoulder, Not elsewhere classified (M25.611)   Strain of muscle(s) and tendon(s) of the rotator cuff of right shoulder, sequela (S46.011S)  Medical/Referring Diagnosis: s/p  Arthroscopy of right shoulder, arthroscopic subacromial decompression, arthroscopic distal clavicle resection, extensive debridement of SLAP tear, biceps labral complex, glenohumeral joint, subacromial space, mini-open rotator cuff repair and biceps tenodesis. Strain of muscle(s) and tendon(s) of the rotator cuff of right shoulder, initial encounter [S46.011A]  Referring Physician:  Deepti Ugalde MD MD Orders:   eval & treat, home exercise program and Full Motion, Full Strength, Aggressive; 2x/week x6 weeks. (22)   Date of Onset:  Onset Date: 22 (surgery; 22 injury)  Allergies: Duloxetine and Erythromycin  Restrictions/Precautions: Post op restrictions and precautions R shoulder. Interventions Planned (Treatment may consist of any combination of the following):    Current Treatment Recommendations: Strengthening; ROM; Manual Therapy - Soft Tissue Mobilization; Manual Therapy - Joint Manipulation; Pain management; Home exercise program; Modalities     Subjective Comments: Pt reports mild sorness in the R shoulder. External rotation was the most painful exercise today.   Initial:    3/10  Post Session:       3/10  Medications Last Reviewed: Naproxen; gabapen 10/4/2022  Updated Objective Findings:    ROM:     Strength:   9/29/22  R Shoulder: mild weakness to abd, ER, and strong and painless to remainder with Soft Tissue Differential testing; 4 to 4+ to R shoulder with positional manual testing, supraspinatus/Empty can and ER. Treatment   Active warm up on UBE level 3 x 5 min, rpm 40-50 range. THERAPEUTIC EXERCISE: (40 minutes):  Exercises for R shoulder motion with 4 way wall slides x15 each; active reciprocal inhibition end range motion with shoulder 90/90 active ER with forearm on wall 3\"x10, and full scaption UE lift off 3\"x10; back to wall assisted Active Isolated stretch to ER by side (with chin tuck into towel) and supported at 90 deg abd, IR supported in 90-deg ER, and forward elevation, 3\"x10 each with cues for spine and rib cage alignment. standing assisted Active Isolated Stretch to shoulder IR behind back, 3\"x10, in front of mirror for visual cues. Pt in supine with Assisted Active Isolated Stretches to ER at 30, 45, and 80 degrees abduction, IR stabilized at 45 and 80 degrees abduction, ER and IR at 90 degrees, flexion, abduction, forward elevation, horiz abd/add, 3\"x10 each. MODALITIES: (15 minutes, un-billable): Cold and compression for post treatment soreness to R shoulder using GameReady, low pressure, 40-deg F, x15' while in sitting UE supported. Good relief with this. HEP: She can continue with her existing HEP. She verbalizes understanding. [employer] Portal  7/5/22: [id number]    Treatment/Session Summary:    Treatment Assessment:  Pt has improved flexion but she is still tight into external rotation. She reported the most pain with external rotation and she requires tactile cues to depress the R scapula during exercise. Communication/Consultation:   None  Equipment provided today:  None  Recommendations/Intent for next treatment session: We will continue to progress R shoulder motion and re-strengthening as pain allows.  Continue with modalities as needed. Update HEP as appropriate.      Total Treatment Billable Duration: 40 minutes   Time In: 6697  Time Out: Saul [address], PTA       Charge Capture Post Session Pain  PT Visit Info  [employer] Portal  MD Guidelines  Scanned Media  Benefits  MyChart    Future Appointments   Date Time Provider Nader Gisell   10/6/2022  2:[facility]   10/11/2022  1:45 PM Sita Pill, PTA [facility]   10/11/2022  2:45 PM Fernando Gomez MD [facility]   [facility]13[facility]2022  [facility]   10[facility]17/2022  [facility]   10/20/2022  1:45 PM Sita Pill, PTA [facility]   10/24/2022  1:45  [facility] [address], PT [facility]   10/27/2022  1:45 PM Sita Pill, PTA [facility]   10/31/2022  1:45 PM Sita Pill, PTA [facility]   11/3/2022  1:45  [facility], PT [facility]

## 2022-10-06 ENCOUNTER — HOSPITAL ENCOUNTER (OUTPATIENT)
Dept: PHYSICAL THERAPY | Age: 37
Setting detail: RECURRING SERIES
Discharge: HOME | End: 2022-10-09
Payer: COMMERCIAL

## 2022-10-06 PROCEDURE — 97110 THERAPEUTIC EXERCISES: CPT

## 2022-10-06 ASSESSMENT — PAIN SCALES - GENERAL: PAINLEVEL_OUTOF10: 3

## 2022-10-06 NOTE — PROGRESS NOTES
Nora Mathur  : 1985  Primary: Roger Hunter Sc  Secondary:  [address] @ [address] [address]  Phone: [phone]  Fax: [phone] Plan Frequency: 2x/week for an additional 12 weeks    Plan of Care/Certification Expiration Date: 22      PT Visit Info: Total # of Visits to Date: 27     Visit Count:  27   OUTPATIENT PHYSICAL THERAPY:OP NOTE TYPE: Treatment Note 10/6/2022       Episode  Appt Desk             Treatment Diagnosis:  Pain in Right Shoulder (M25.511)  Stiffness of Right Shoulder, Not elsewhere classified (M25.611)   Strain of muscle(s) and tendon(s) of the rotator cuff of right shoulder, sequela (S46.011S)  Medical/Referring Diagnosis: s/p  Arthroscopy of right shoulder, arthroscopic subacromial decompression, arthroscopic distal clavicle resection, extensive debridement of SLAP tear, biceps labral complex, glenohumeral joint, subacromial space, mini-open rotator cuff repair and biceps tenodesis. Strain of muscle(s) and tendon(s) of the rotator cuff of right shoulder, initial encounter [S46.011A]  Referring Physician:  Demetrius Calderon MD MD Orders:   eval & treat, home exercise program and Full Motion, Full Strength, Aggressive; 2x/week x6 weeks. (22)   Date of Onset:  Onset Date: 22 (surgery; 22 injury)  Allergies: Duloxetine and Erythromycin  Restrictions/Precautions: Post op restrictions and precautions R shoulder. Interventions Planned (Treatment may consist of any combination of the following):    Current Treatment Recommendations: Strengthening; ROM; Manual Therapy - Soft Tissue Mobilization; Manual Therapy - Joint Manipulation; Pain management; Home exercise program; Modalities     Subjective Comments: Say her shoulder is a little sore today. Initial:    3/10  Post Session:        /10  Medications Last Reviewed: Naproxen; gabapen 10/6/2022  Updated Objective Findings:    ROM:   Not measured.    Strength:   Not measured. Treatment   Active warm up on UBE level 1 x10'. THERAPEUTIC EXERCISE: (50 minutes): Exercises for R shoulder motion with Muscle Balance moves for upper quarter, including standing with back against wall bilateral ER in shoulder neutral, ER and IR supported in abduction, facing wall bilat ER at 90 deg flexion, facing wall bilat flexion forearm wall slide, and back to wall shoulder flexion, 3\"x10 each with assisted over pressure for end range with back to wall moves, and emphasis on stable spine/scapula with mobile shoulder and disassociation of movement at shoulder; standing assisted Active Isolated Stretch to ext/add/IR behind back 3\"x10 and with reciprocal inhibition lift off 10\"x5. Exercises for shoulder girdle stabilization with active red flex bar oscil at 0-deg ER, 90/90 ER, straight arm neutral, and full scaption, 2x20\" L and R to each; quadruped respiratory belly lift 2x5 breath holds with instruction for HEP practice. Good return demonstration but will need to review. MODALITIES: (15 minutes, un-billable): Cold and compression for post treatment soreness to R shoulder using GameReady, low pressure, 40-deg F, x15' while in sitting UE supported. Good relief with this. HEP: She can continue with her existing HEP for motion. She is to add the wall slide ER at 90 deg flexion and forearm wall slides facing the wall, active lift off behind her back and the quadruped closed chain drill done today . She verbalizes understanding. [employer] Portal  7/5/22: [id number]    Treatment/Session Summary:    Treatment Assessment: Good effort and performance of the exercises done today. Worked on active shoulder moves with emphasis on mechanics and decrasing compenstations. Continues to have shoulder, cuff weakness, mild stiffness. Pain and fatigue with the exercises. Good control with ice post treatment.   Communication/Consultation:   None  Equipment provided today:  None  Recommendations/Intent for next treatment session: We will continue to progress R shoulder motion and re-strengthening as pain allows. Continue with modalities as needed. Update HEP next week.      Total Treatment Billable Duration: 50 minutes   Time In: 1440  Time Out: [address], PT       Charge Capture Post Session Pain  PT Visit Info  [employer] Portal  MD Guidelines  Scanned Media  Benefits  MyChart    Future Appointments   Date Time Provider Nader Gisell   10/11/2022  1:45 PM [facility]   10/11/2022  2:45 PM MD BUBBA Phelan [facility]   10/13/2022  2:30 PM Margart Edge, PT [facility]   10/17/2022  1:45 PM Margart Edge, PT [facility]   10/20/2022  1:45 PM Tiffany Storey, PTA [facility]   10/24/2022  1:45 PM Margart Edge, PT [facility]   10/27/2022  1:45 PM Tiffany Storey, PTA [facility]   10/31/2022  1:45 PM Radhae Cordelia, PTA [facility]   11/3/2022  1:45 PM Margart Edge, PT [facility]

## 2022-10-11 ENCOUNTER — HOSPITAL ENCOUNTER (OUTPATIENT)
Dept: PHYSICAL THERAPY | Age: 37
Setting detail: RECURRING SERIES
Discharge: HOME | End: 2022-10-14
Payer: COMMERCIAL

## 2022-10-11 ENCOUNTER — OFFICE VISIT (OUTPATIENT)
Dept: ORTHOPEDIC SURGERY | Age: 37
End: 2022-10-11

## 2022-10-11 DIAGNOSIS — Z47.89 ORTHOPEDIC AFTERCARE: Primary | ICD-10-CM

## 2022-10-11 PROCEDURE — 99212 OFFICE O/P EST SF 10 MIN: CPT | Performed by: ORTHOPAEDIC SURGERY

## 2022-10-11 PROCEDURE — 97110 THERAPEUTIC EXERCISES: CPT

## 2022-10-11 NOTE — PROGRESS NOTES
Velma Zeeshan  : 1985  Primary: Hue Mainpeyton Allison  Secondary:  Desirae Coronado @ [address] [address]  Phone: [phone]  Fax: [phone] Plan Frequency: 2x/week for an additional 12 weeks    Plan of Care/Certification Expiration Date: 22      PT Visit Info: Total # of Visits to Date: 32     Visit Count:  32   OUTPATIENT PHYSICAL THERAPY:OP NOTE TYPE: Treatment Note 10/11/2022       Episode  Appt Desk             Treatment Diagnosis:  Pain in Right Shoulder (M25.511)  Stiffness of Right Shoulder, Not elsewhere classified (M25.611)   Strain of muscle(s) and tendon(s) of the rotator cuff of right shoulder, sequela (S46.011S)  Medical/Referring Diagnosis: s/p  Arthroscopy of right shoulder, arthroscopic subacromial decompression, arthroscopic distal clavicle resection, extensive debridement of SLAP tear, biceps labral complex, glenohumeral joint, subacromial space, mini-open rotator cuff repair and biceps tenodesis. Strain of muscle(s) and tendon(s) of the rotator cuff of right shoulder, initial encounter [S46.011A]  Referring Physician:  Carrie Gao MD MD Orders:   eval & treat, home exercise program and Full Motion, Full Strength, Aggressive; 2x/week x6 weeks. (22)   Date of Onset:  Onset Date: 22 (surgery; 22 injury)  Allergies: Duloxetine and Erythromycin  Restrictions/Precautions: Post op restrictions and precautions R shoulder. Interventions Planned (Treatment may consist of any combination of the following):    Current Treatment Recommendations: Strengthening; ROM; Manual Therapy - Soft Tissue Mobilization; Manual Therapy - Joint Manipulation; Pain management; Home exercise program; Modalities     Subjective Comments:  Pt reports soreness in the anterior shoulder when she reached behind her back. She stepped on her cat and extended the R arm but she did not fall.   Initial:     5/10 R shoulder Post Session:        no increase  Medications Last Reviewed: Naproxen; gabapen 10/11/2022  Updated Objective Findings:    ROM:   Not measured. Strength:   Not measured. Treatment   Active warm up on UBE level 1 x10'. THERAPEUTIC EXERCISE: (40 minutes): To increase AROM and strength of the R UE. Pt required verbal and tactile cues to depress the R scapula with activity. Wall slides into flexion 3 sec hold x 15. Pt was positioned in supine for PROM with over pressure to R shoulder. Flexion, scaption, ER at 90 degrees and IR at 90 degrees. The scapula was stabilized for all motions. Rhythmic stabilization with red bar at scaption 30 sec x2  Prone rows 3x10, 2#  Prone shoulder extension 2# 3x10  Prone middle trap 2x10, 1#  Prone lower trap 2x10    MODALITIES: (15 minutes, un-billable): Cold and compression for post treatment soreness to R shoulder using GameReady, low pressure, 40-deg F, x15' while in sitting UE supported. Good relief with this. HEP: She can continue with her existing HEP for motion. She is to add the wall slide ER at 90 deg flexion and forearm wall slides facing the wall, active lift off behind her back and the quadruped closed chain drill done today . She verbalizes understanding. [employer] Portal  7/5/22: [id number]    Treatment/Session Summary:    Treatment Assessment:  Pt will see the MD today for a follow up. She is making good progress with AROM but is weak with middle and lower trap exercises. She reported anterior shoulder pain but attributes the pain to reaching behind her body quickly. Communication/Consultation:   None  Equipment provided today:  None  Recommendations/Intent for next treatment session: We will continue to progress R shoulder motion and re-strengthening as pain allows. Continue with modalities as needed. Update HEP next week.      Total Treatment Billable Duration: 40 minutes   Time In: 7700  Time Out: [address], [facility]       Charge Capture Post Session Pain  PT Visit Info [facility]  MD Guidelines  Scanned Media  Benefits  MyChart    Future Appointments   Date Time Provider Nader Gisell   [facility]2022  [facility]   10/17/2022  1:45  [facility], PT [facility]   10/20/2022  1:45 PM Arnulfo Gunderson, PTA [facility]   10/24/2022  1:45  [facility], PT [facility]   10/27/2022  1:45 PM Arnulfo Gunderson, PTA [facility]   10/31/2022  1:45 PM Arnulfo Gunderson, PTA [facility]   11/3/2022  1:45  [facility], PT [facility]   12/13/2022  2:45 PM Zulema Andrade MD [facility]

## 2022-10-11 NOTE — PROGRESS NOTES
Name: Lucille Ivy  YOB: 1985  Gender: female  MRN: [id number]          HPI: Lucille Ivy is a 40 y.o. right-hand-dominant female 4 months status post arthroscopy right shoulder ASD, ADCR, extensive debridement SLAP tear, biceps labral complex, glenohumeral joint, subacromial space, mini open rotator cuff repair and biceps tenodesis. Operative findings were notable for a 3 cm rotator cuff tear. She returns noting she is doing better      ROS/Meds/PSH/PMH/FH/SH: A ten system review of systems was performed and is negative other than what is in the HPI. Tobacco:  reports that she quit smoking about a year ago. Her smoking use included cigarettes. She has a 10.00 pack-year smoking history. She has never used smokeless tobacco.  There were no vitals taken for this visit. Physical Examination:  She is an awake alert pleasant female ambulating without difficulty  She has restricted range of cervical spine motion with bilateral trapezial tenderness right greater than left    The left shoulder has 0 to 180 degrees of active and 0 to 180 degrees passive forward elevation. Pain at extremes of motion and in the overhead position  Internal rotation is to T6. External rotation is to 60 degrees at the side. In the 90 degree abducted position 90 degrees of external and 90 degrees internal rotation  The AC joint is tender  SC joint is non-tender. Greater tuberosity is non-tender. negative biceps  Negative O'Briens sign  negative lift-off sign  Negative belly press sign  Negative bear huggers sign  negative drop sign  negative hornblower's sign  No posterior glenohumeral joint line tenderness. No evident excessive external rotation  Rotator cuff strength is 5/5.  negative external rotation stress test.   Negative empty can sign  There is no evident anterior or posterior apprehension with a negative sulcus sign.    No instability  negative external and internal Rotation lag sign  Neurovascularly intact. The right shoulder has well-healed incisions  She has pain and weakness at end range of motion  The right shoulder has 0 to 180 degrees of active and 0 to 180 degrees passive forward elevation. Internal rotation is to T6. External rotation is to 60 degrees at the side. In the 90 degree abducted position 90 degrees of external and 80 degrees internal rotation  The AC joint is non-tender  SC joint is non-tender. Greater tuberosity is non-tender. negative biceps  Negative O'Briens sign  negative lift-off sign  Negative belly press sign  Negative bear huggers sign  negative drop sign  negative hornblower's sign  No posterior glenohumeral joint line tenderness. No evident excessive external rotation  Rotator cuff strength is 5/5.  negative external rotation stress test.   Negative empty can sign  There is no evident anterior or posterior apprehension with a negative sulcus sign. No instability  negative external and internal Rotation lag sign  Neurovascularly intact. Data Reviewed:      Impression:   1. Orthopedic aftercare       status post arthroscopy right shoulder ASD, ADCR, extensive debridement SLAP tear, biceps labral complex, glenohumeral joint, subacromial space, mini open rotator cuff repair and biceps tenodesis 4 months  Left shoulder pain  History of right-sided DVT  History of seizure disorder   history of RSD  Throat pain  Back pain  Neck spasm      Plan:   I discussed the plan with the patient. She is doing very well. we we will continue physical therapy working on full range of motion and strengthening. I will recheck her back in 2 months. 2.  Self-limited problem    Follow up: Return in about 2 months (around 12/11/2022).              Holden Schulz MD

## 2022-10-13 ENCOUNTER — HOSPITAL ENCOUNTER (OUTPATIENT)
Dept: PHYSICAL THERAPY | Age: 37
Setting detail: RECURRING SERIES
Discharge: HOME | End: 2022-10-16
Payer: COMMERCIAL

## 2022-10-13 PROCEDURE — 97110 THERAPEUTIC EXERCISES: CPT

## 2022-10-13 ASSESSMENT — PAIN SCALES - GENERAL: PAINLEVEL_OUTOF10: 4

## 2022-10-13 NOTE — PROGRESS NOTES
Cj Barth  : 1985  Primary: Kurt Will Sc  Secondary:  [address] @ [address] [address]  Phone: [phone]  Fax: [phone] Plan Frequency: 2x/week for an additional 12 weeks    Plan of Care/Certification Expiration Date: 22      PT Visit Info: Total # of Visits to Date: 28     Visit Count:  28   OUTPATIENT PHYSICAL THERAPY:OP NOTE TYPE: Treatment Note 10/13/2022       Episode  Appt Desk             Treatment Diagnosis:  Pain in Right Shoulder (M25.511)  Stiffness of Right Shoulder, Not elsewhere classified (M25.611)   Strain of muscle(s) and tendon(s) of the rotator cuff of right shoulder, sequela (S46.011S)  Medical/Referring Diagnosis: s/p  Arthroscopy of right shoulder, arthroscopic subacromial decompression, arthroscopic distal clavicle resection, extensive debridement of SLAP tear, biceps labral complex, glenohumeral joint, subacromial space, mini-open rotator cuff repair and biceps tenodesis. Strain of muscle(s) and tendon(s) of the rotator cuff of right shoulder, initial encounter [S46.011A]  Referring Physician:  Tata Calderón MD MD Orders:   eval & treat, home exercise program and Full Motion, Full Strength, Aggressive; 2x/week x6 weeks. (22)   Date of Onset:  Onset Date: 22 (surgery; 22 injury)  Allergies: Duloxetine and Erythromycin  Restrictions/Precautions: Post op restrictions and precautions R shoulder. Interventions Planned (Treatment may consist of any combination of the following):    Current Treatment Recommendations: Strengthening; ROM; Manual Therapy - Soft Tissue Mobilization; Manual Therapy - Joint Manipulation; Pain management; Home exercise program; Modalities     Subjective Comments: Reports having her follow up with Dr. Kun Villarreal Tuesday. He is pleased with her progress. He wants her to continue with PT for 6 weeks, thne follow up again. Says her shoulder is doing better. Still with pain to it. Using the R arm more, but pain and difficutly--folding clothes, washing dishes by hand. Initial:    4/10 R shoulder Post Session:          Medications Last Reviewed: Naproxen; gabapen 10/13/2022  Updated Objective Findings:    ROM:   Not measured. Strength:   Not measured. Treatment   Active warm up on UBE level 1 x5'. THERAPEUTIC EXERCISE: (45 minutes): Exercises for R shoulder motion with Assisted Active Isolated Stretches to ER at 30, 45, and 90 degrees abduction, IR stabilized at 45 and 90 degrees abduction, ER and IR at 90 degrees flexion, abduction, forward elevation, horizontal abduction/adduction, D1 and D2 flexion--3\"x10 each. Exercises for shoulder strength as per grid. Exercises modified as indicated. Verbal and tactile cues for these as needed for correct mechanics, and instruction for HEP practice. MODALITIES: (15 minutes, un-billable): Cold and compression for post treatment soreness to R shoulder using GameReady, low pressure, 40-deg F, x15' while in sitting. Good relief with this. Date:  10/13/22 Date:   Date:     Activity/Exercise Parameters Parameters Parameters   UBE L1 x5'     ROM/Flexibility Assisted as above     Rhythmic Stabilization: -     Strength:   Shoulder ext Prone unilat  1# 1x10     Horiz abd/Middle Trap Prone unilat  1# 1x10     Scaption/Lower Trap Prone unilat  1# 1x10     ER/IR-- 90 deg Prone unilat  1# 1x10 ea     Serratus Press Supine press, bilat  3# 1x15     Row Bent row  3# 1x15 ea     ER -- plane of scapula 2# 1x15 ea     Overhead press 45-deg incline press, bilat  2# 1x15       HEP: Provided written HEP for the scapulo-cuff strength exercises done today. She is to work on these as able. She verbalizes understanding. [employer] Portal  7/5/22: [id number]  10/13/22: [id number] Initial scapulo-cuff strrength    Treatment/Session Summary:    Treatment Assessment: Very good R shoulder ROM. Good strength with quick manual testing, but weakness to the shouder girdle.  Good performance of the exercises done. Communication/Consultation:   None  Equipment provided today:  None  Recommendations/Intent for next treatment session: We will continue to progress R shoulder motion and re-strengthening as pain allows. Continue with modalities as needed. Review updated HEP.      Total Treatment Billable Duration: 45 minutes   Time In: 0163  Time Out: 2415 Rober Stearns, PT       Charge Capture Post Session Pain  PT Visit Info  [employer] Portal  MD Guidelines  Scanned Media  Benefits  MyChart    Future Appointments   Date Time Provider Nader Jameson   10/17/2022  1:45 PM Steve Mata, PT [facility]   10/20/2022  1:45 PM Ariana Yo, PTA [facility]   10/24/2022  1:45 PM Steve Mata, PT [facility]   10/27/2022  1:45 PM Ariana Yo, PTA [facility]   10/31/2022  1:45 PM Ariana Yo, PTA [facility]   11/3/2022  1:45 PM Steve Mata, PT [facility]   12/13/2022  2:45 PM MD BUBBA Arnold [facility]

## 2022-10-17 ENCOUNTER — HOSPITAL ENCOUNTER (OUTPATIENT)
Dept: PHYSICAL THERAPY | Age: 37
Setting detail: RECURRING SERIES
Discharge: HOME | End: 2022-10-20
Payer: COMMERCIAL

## 2022-10-17 PROCEDURE — 97110 THERAPEUTIC EXERCISES: CPT

## 2022-10-17 NOTE — PROGRESS NOTES
Misbah Thomson  : 1985  Primary: Daniel Allison  Secondary:  [address] @ [address] [address]  Phone: [phone]  Fax: [phone] Plan Frequency: 2x/week for an additional 12 weeks    Plan of Care/Certification Expiration Date: 22      PT Visit Info: Total # of Visits to Date: 35     Visit Count:  35   OUTPATIENT PHYSICAL THERAPY:OP NOTE TYPE: Treatment Note 10/17/2022       Episode  Appt Desk             Treatment Diagnosis:  Pain in Right Shoulder (M25.511)  Stiffness of Right Shoulder, Not elsewhere classified (M25.611)   Strain of muscle(s) and tendon(s) of the rotator cuff of right shoulder, sequela (S46.011S)  Medical/Referring Diagnosis: s/p  Arthroscopy of right shoulder, arthroscopic subacromial decompression, arthroscopic distal clavicle resection, extensive debridement of SLAP tear, biceps labral complex, glenohumeral joint, subacromial space, mini-open rotator cuff repair and biceps tenodesis. Strain of muscle(s) and tendon(s) of the rotator cuff of right shoulder, initial encounter [S46.011A]  Referring Physician:  Pham Farias MD MD Orders:   eval & treat, home exercise program and Full Motion, Full Strength, Aggressive; 2x/week x6 weeks. (22)   Date of Onset:  Onset Date: 22 (surgery; 22 injury)  Allergies: Duloxetine and Erythromycin  Restrictions/Precautions: Post op restrictions and precautions R shoulder. Interventions Planned (Treatment may consist of any combination of the following):    Current Treatment Recommendations: Strengthening; ROM; Manual Therapy - Soft Tissue Mobilization; Manual Therapy - Joint Manipulation; Pain management; Home exercise program; Modalities     Subjective Comments: Pretty sore after last time. Was not able to do too much HEP over the weekend.   Initial:     /10 R shoulder Post Session:          Medications Last Reviewed: Naproxen; gabapen 10/17/2022  Updated Objective Findings:    ROM:   Not measured. Strength:   Not measured. Treatment   Active warm up on UBE level 1 x10'. THERAPEUTIC EXERCISE: (45 minutes): Exercises for R shoulder motion with active Muscle Balance moves for upper quarter, including standing with back against wall bilateral ER in shoulder neutral and supported in abduction (close to 90 deg as possible), IR supported in abduction, facing wall bilat ER at 90 deg flexion, facing wall bilat forearm wall slide, and back to wall unilat shoulder flex with manual over-pressure, 3\"x10 each with emphasis on stable spine/scapula with mobile shoulder and disassociation of movement at shoulder; assisted stretch to extension/add/IR 3\"x10 and reciprocal inhibition lift off behind back, 10\"x5. Exercises for shoulder girdle stabilization drill with red flex bar oscillation in shoulder neutral with sagittal plane motion and clockwise and counter-clockwise motions; wall dribbles at for forward elevation; and plantigrade at wall with scap abd/add and shoulder taps moving L only. Exercises done as per grid. Verbal and tactile cues for these as needed for correct mechanics. MODALITIES: (15 minutes, un-billable): Cold and compression for post treatment soreness to R shoulder using GameReady, low pressure, 40-deg F, x15' while in sitting. Good relief with this.    Date:  10/13/22 Date:  10/17/22 Date:     Activity/Exercise Parameters Parameters Parameters   UBE L1 x5' x10'    ROM/Flexibility Assisted as above At wall as above    Rhythmic Stabilization: - Red Flex bar oscil   Shoulder neutral 3x20-30\" ea;  Wall dribble  500-g ball  12 o'clock   3x30 each    Strength:   Shoulder ext Prone unilat  1# 1x10 -    Horiz abd/Middle Trap Prone unilat  1# 1x10 -    Scaption/Lower Trap Prone unilat  1# 1x10 -    ER/IR-- 90 deg Prone unilat  1# 1x10 ea -    Serratus Press Supine press, bilat  3# 1x15 -    Row Bent row  3# 1x15 ea -    ER -- plane of scapula 2# 1x15 ea -    Overhead press 45-deg incline press, bilat  2# 1x15 -    CKC UE  Wall push  Scap abd/add x10,  Shoulder Tap moving L only  2x5      HEP: She is to work on her motion exercises and using her arm with ADL and household activities tomorrow. She is to ice as needed. She verbalizes understanding. [employer] Portal  7/5/22: [id number]  10/13/22: [id number] Initial scapulo-cuff strrength    Treatment/Session Summary:    Treatment Assessment: Good effort with the exercises today. Pain and shoulder girdle weakness limits her. Making gains on active shoulder girdle stabilization. Cuff is weak. Her humeral elevation strength is slowly progressing. She was quite apprehensive with the closed chain shoulder tap move do to not trusting if the R arm can hold her in that position. She improved with practice. Communication/Consultation:   None  Equipment provided today:  None  Recommendations/Intent for next treatment session: We will continue to progress R shoulder motion and re-strengthening as pain allows. Continue with modalities as needed. Review updated HEP.      Total Treatment Billable Duration: 45 minutes   Time In: 1400  Time Out: 500 Catherine Pete, PT       Charge Capture Post Session Pain  PT Visit Info  [employer] Portal  MD Guidelines  Scanned Media  Benefits  MyChart    Future Appointments   Date Time Provider Nader Jameson   10/20/2022  1:45 PM Pricilla [facility]   10/24/2022  1:45 PM Earl , PT [facility]   10/27/2022  1:45 PM Moriah Lopez PTA [facility]   10/31/2022  1:45 PM Moriah Lopez PTA [facility]   11/3/2022  1:45 PM Earl , PT [facility]   12/13/2022  2:45 PM Selene Glynn.MD [facility]

## 2022-10-20 ENCOUNTER — HOSPITAL ENCOUNTER (OUTPATIENT)
Dept: PHYSICAL THERAPY | Age: 37
Setting detail: RECURRING SERIES
Discharge: HOME | End: 2022-10-23

## 2022-10-20 PROCEDURE — 97110 THERAPEUTIC EXERCISES: CPT

## 2022-10-20 NOTE — PROGRESS NOTES
Yuly Nova  : 1985  Primary: Leila Jacobs Sc  Secondary:  [address] @ [address] [address]  Phone: [phone]  Fax: [phone] Plan Frequency: 2x/week for an additional 12 weeks    Plan of Care/Certification Expiration Date: 22      PT Visit Info: Total # of Visits to Date: 29     Visit Count:  29   OUTPATIENT PHYSICAL THERAPY:OP NOTE TYPE: Treatment Note 10/20/2022       Episode  Appt Desk             Treatment Diagnosis:  Pain in Right Shoulder (M25.511)  Stiffness of Right Shoulder, Not elsewhere classified (M25.611)   Strain of muscle(s) and tendon(s) of the rotator cuff of right shoulder, sequela (S46.011S)  Medical/Referring Diagnosis: s/p  Arthroscopy of right shoulder, arthroscopic subacromial decompression, arthroscopic distal clavicle resection, extensive debridement of SLAP tear, biceps labral complex, glenohumeral joint, subacromial space, mini-open rotator cuff repair and biceps tenodesis. Strain of muscle(s) and tendon(s) of the rotator cuff of right shoulder, initial encounter [S46.011A]  Referring Physician:  Madyson Poole MD MD Orders:   eval & treat, home exercise program and Full Motion, Full Strength, Aggressive; 2x/week x6 weeks. (22)   Date of Onset:  Onset Date: 22 (surgery; 22 injury)  Allergies: Duloxetine and Erythromycin  Restrictions/Precautions: Post op restrictions and precautions R shoulder. Interventions Planned (Treatment may consist of any combination of the following):    Current Treatment Recommendations: Strengthening; ROM; Manual Therapy - Soft Tissue Mobilization; Manual Therapy - Joint Manipulation; Pain management; Home exercise program; Modalities     Subjective Comments:  Pt reports increased soreness after the last session.   Initial:     mild to moderate pain in R shoulder Post Session:        no increase  Medications Last Reviewed: Naproxen; gabapen 10/20/2022  Updated Objective Findings:    ROM:   Not measured. Strength:   Not measured. Treatment   Active warm up on UBE level 3 x 5 min. THERAPEUTIC EXERCISE: (40 minutes): Exercises for R shoulder motion with active Muscle Balance moves for upper quarter, including standing with back against wall bilateral ER in shoulder neutral and supported in abduction (close to 90 deg as possible), IR supported in abduction, facing wall bilat ER at 90 deg flexion, facing wall bilat forearm wall slide, 3\"x10 each with emphasis on stable spine/scapula. Exercises for shoulder girdle stabilization drill- wall dribbles at for forward elevation with red ball; and plantigrade at wall with scap abd/add and shoulder taps moving L only. Exercises done as per grid. Verbal and tactile cues for these as needed for correct mechanics. MODALITIES: (15 minutes, un-billable): Cold and compression for post treatment soreness to R shoulder using GameReady, low pressure, 40-deg F, x15' while in sitting. Good relief with this. Date:  10/13/22 Date:  10/17/22 Date:  10/20/22   Activity/Exercise Parameters Parameters Parameters   UBE L1 x5' x10' X 5 min   ROM/Flexibility Assisted as above At wall as above See above   Rhythmic Stabilization: - Red Flex bar oscil   Shoulder neutral 3x20-30\" ea;  Wall dribble  500-g ball  12 o'clock   3x30 each    Strength:   Shoulder ext Prone unilat  1# 1x10 -    Horiz abd/Middle Trap Prone unilat  1# 1x10 -    Scaption/Lower Trap Prone unilat  1# 1x10 -    ER/IR-- 90 deg Prone unilat  1# 1x10 ea -    Serratus Press Supine press, bilat  3# 1x15 -    Row Bent row  3# 1x15 ea -    ER -- plane of scapula 2# 1x15 ea -    Overhead press 45-deg incline press, bilat  2# 1x15 -    CKC UE  Wall push  Scap abd/add x10,  Shoulder Tap moving L only  2x5 Wall push up with scapula retraction 2x10    Shoulder tap moving 2x5     HEP: She is to work on her motion exercises and using her arm with ADL and household activities tomorrow.  She is to ice as needed. She verbalizes understanding. [email or web address] Portal  7/5/22: [id number]  10/13/22: [id number] Initial scapulo-cuff strrength    Treatment/Session Summary:    Treatment Assessment:  Pt fatigues easily with closed kinetic chain activities but she is becoming more aware of her body mechanics during exercise. Pt's external rotation is improving. Communication/Consultation:   None  Equipment provided today:  None  Recommendations/Intent for next treatment session: We will continue to progress R shoulder motion and re-strengthening as pain allows. Continue with modalities as needed. Review updated HEP.      Total Treatment Billable Duration: 40 minutes   Time In: 4774  Time Out: Atamaria 52, PTA       Charge Capture Post Session Pain  PT Visit Info  [email or web address] Portal  MD Guidelines  Scanned Media  Benefits  MyChart    Future Appointments   Date Time Provider Nader Gisell   10/24/2022  1:45 PM [employer] [place], PT [facility]   10/27/2022  1:45 PM Babara Loveless, PTA [facility]   10/31/2022  1:45 PM Babara Loveless, PTA [facility]   11/3/2022  1:45 PM Demetrius Gustavo, PT [facility]   11/7/2022  2:30 PM Babara Loveless, PTA [facility]   11/10/2022  1:45 PM Demetrius Gustavo, PT [facility]   11/14/2022  1:45 PM Babara Loveless, PTA [facility]   11/17/2022  3:15 PM Demetrisu Gustavo, PT [facility]   11/28/2022  1:45 PM Babara Loveless, PTA [facility]   12/1/2022  3:15 PM Demetrius Gustavo, PT [facility]   12/5/2022  1:45 PM Demetrius Gustavo, PT [facility]   12/8/2022  1:45 PM Demetrius Gustavo, PT [facility]   12/13/2022  1:45 PM Babara Loveless, PTA [facility]   12/13/2022  2:45 PM Jessie Dash MD [facility]

## 2022-10-24 ENCOUNTER — HOSPITAL ENCOUNTER (OUTPATIENT)
Dept: PHYSICAL THERAPY | Age: 37
Setting detail: RECURRING SERIES
Discharge: HOME | End: 2022-10-27

## 2022-10-24 PROCEDURE — 97110 THERAPEUTIC EXERCISES: CPT

## 2022-10-24 NOTE — PROGRESS NOTES
Donnell Fer  : 1985  Primary: Alfonso Allison  Secondary:  Deion Ear @ [address] [facility] [address]  [place] [address]  Phone: [phone]  Fax: [phone] Plan Frequency: 2x/week for an additional 12 weeks    Plan of Care/Certification Expiration Date: 22      PT Visit Info: Total # of Visits to Date: 28     Visit Count:  28   OUTPATIENT PHYSICAL THERAPY:OP NOTE TYPE: Treatment Note 10/24/2022       Episode  Appt Desk             Treatment Diagnosis:  Pain in Right Shoulder (M25.511)  Stiffness of Right Shoulder, Not elsewhere classified (M25.611)   Strain of muscle(s) and tendon(s) of the rotator cuff of right shoulder, sequela (S46.011S)  Medical/Referring Diagnosis: s/p  Arthroscopy of right shoulder, arthroscopic subacromial decompression, arthroscopic distal clavicle resection, extensive debridement of SLAP tear, biceps labral complex, glenohumeral joint, subacromial space, mini-open rotator cuff repair and biceps tenodesis. Strain of muscle(s) and tendon(s) of the rotator cuff of right shoulder, initial encounter [S46.011A]  Referring Physician:  Mary Sullivan MD MD Orders:  eval & treat, home exercise program and Full Motion, Full Strength, Aggressive; 2x/week x6 weeks. (10/11/22)   Date of Onset:  Onset Date: 22 (surgery; 22 injury)  Allergies: Duloxetine and Erythromycin  Restrictions/Precautions: Post op restrictions and precautions R shoulder. Interventions Planned (Treatment may consist of any combination of the following):    Current Treatment Recommendations: Strengthening; ROM; Manual Therapy - Soft Tissue Mobilization; Manual Therapy - Joint Manipulation; Pain management; Home exercise program; Modalities     Subjective Comments: Shoulder is sore today. Has been working on her HEP.   Initial:     mild to moderate pain in R shoulder Post Session:        no increase  Medications Last Reviewed: Naproxen; gabapen 10/24/2022  Updated Objective Findings:    ROM:  PROM RUE (degrees)  R Shoulder Flex  0-180: 170 (forward elevation)  R Shoulder ABduction 0-180: 95 (with scapula stabilized)  R Shoulder Int Rotation  0-70: 70 (stabilized at 45 abd, 60 at 80 abd)  R Shoulder Ext Rotation  0-90: 75 (in shoulder neutral, 90 at 45 and 80 abd)  AROM RUE (degrees)  R Shoulder Flexion 0-180: 160 (forward elevation)  R Shoulder Int Rotation  0-70: 40 (in abduction; to T9 behind back.)  R Shoulder Ext Rotation 0-90: 70 (by side, 80 deg when in abduction)  Strength:   R Shoulder: weak with pain to ER, abd/flex, strong and painless to remainder with Soft Tissue Differential testing; 4+ to flex, abd, ER, and IR with manual testing  Treatment   Active warm up on UBE. THERAPEUTIC EXERCISE: (45 minutes): Exercises for R shoulder motion with active Muscle Balance moves for upper quarter, including standing with back against wall bilateral ER in shoulder neutral and in abduction (close to 90 deg as possible), IR in abduction, facing wall bilat ER at 90 deg flexion, facing wall bilat forearm wall slide, 3\"x10 each with emphasis on stable spine/scapula; active reciprocal inhibition for ER 90/90 and full scaption at wall, 3\"x10 each; assisted active isolated to ext, ext/add, ext/add/IR, 3\"x10 each. Exercises for shoulder girdle strength and stabilization done as per grid. Verbal and tactile cues for these as needed for correct mechanics. MODALITIES: (15 minutes, un-billable): Cold and compression for post treatment soreness to R shoulder using GameReady, low pressure, 40-deg F, x15' while in sitting. Good relief with this.    Date:  10/13/22 Date:  10/17/22 Date:  10/20/22 Date  10/24/22   Activity/Exercise Parameters Parameters Parameters    UBE L1 x5' x10' X 5 min L 1 x10'   ROM/Flexibility Assisted as above At wall as above See above As above   Rhythmic Stabilization: - Red Flex bar oscil   Shoulder neutral 3x20-30\" ea;  Wall dribble  500-g ball  12 o'clock   3x30 each  Red Flex bar oscil   Shoulder neutral, ER by side 1x30\" ea;  Wall dribble  1-kg ball  12 o'clock   3x30 each   Strength:   Shoulder ext Prone unilat  1# 1x10 -  -   Horiz abd/Middle Trap Prone unilat  1# 1x10 -  -   Scaption/Lower Trap Prone unilat  1# 1x10 -  -   ER/IR-- 90 deg Prone unilat  1# 1x10 ea -  -   Serratus Press Supine press, bilat  3# 1x15 -  -   Row Bent row  3# 1x15 ea -  -   ER -- plane of scapula 2# 1x15 ea -  -   Overhead press 45-deg incline press, bilat  2# 1x15 -  45-deg incline  3#+green band pull apart  2x10   CKC UE  Wall push  Scap abd/add x10,  Shoulder Tap moving L only  2x5 Wall push up with scapula retraction 2x10    Shoulder tap moving 2x5 Quadruped   Respiratory belly lift bilat UE 1x5 breath,  R UE only 2x5 breath    Quadruped shoulder taps  1x5     HEP: She is to work on her HEP. She verbalizes understanding. [employer] Portal  7/5/22: [id number]  10/13/22: [id number] Initial scapulo-cuff strrength    Treatment/Session Summary:    Treatment Assessment: Good shoulder ROM. Weak to the cuff, but strength is improving. Still wtih pain to the shoulder. Good effort and performance of the exercises today. Communication/Consultation:   Progress note done. Equipment provided today:  None  Recommendations/Intent for next treatment session: We will continue to progress R shoulder motion and re-strengthening as pain allows. Continue with modalities as needed. Review updated HEP.      Total Treatment Billable Duration: 45 minutes   Time In: 1386  Time Out: [address], PT       Charge Capture Post Session Pain  PT Visit Info  [employer] Portal  MD Guidelines  Scanned Media  Benefits  MyChart    Future Appointments   Date Time Provider Nader Jameson   10/27/2022  1:45 PM Pricilla [facility]   10/31/2022  1:45 PM Cooper Cabello PTA [facility]   11/3/2022  1:45 PM SHANNEN Obregon [facility]   11/7/2022  2:30 PM Cooper Cabello PTA [facility] 11/10/2022  1:45 PM Philly Bal, PT [facility]   11/14/2022  1:45 PM Willie Rosenthal, PTA [facility]   11/17/2022  3:15 PM Philly Bal, PT [facility]   11/28/2022  1:45 [facility]   12/1/2022  3:15 PM [facility]   12/5/2022  1:45 PM [facility]   12/8/2022  1:45 PM Philly Bal, PT [facility]   12/13/2022  1:45 PM Willie Rosenthal, PTA [facility]   12/13/2022  2:45 PM MD BUBBA Wagoner [facility]

## 2022-10-24 NOTE — PROGRESS NOTES
Katie Childs  : 1985  Primary: Alex Janet Allison  Secondary:  [address] @ [address] [address]  Phone: [phone]  Fax: [phone] Plan Frequency: 2x/week for an additional 12 weeks    Plan of Care/Certification Expiration Date: 22      PT Visit Info: Total # of Visits to Date: 28      OUTPATIENT PHYSICAL THERAPY:OP NOTE TYPE: Progress Report 10/24/2022               Episode  Appt Desk         Treatment Diagnosis:  Pain in Right Shoulder (M25.511)  Stiffness of Right Shoulder, Not elsewhere classified (M25.611)   Strain of muscle(s) and tendon(s) of the rotator cuff of right shoulder, sequela (S46.011S)  Medical/Referring Diagnosis: s/p  Arthroscopy of right shoulder, arthroscopic subacromial decompression, arthroscopic distal clavicle resection, extensive debridement of SLAP tear, biceps labral complex, glenohumeral joint, subacromial space, mini-open rotator cuff repair and biceps tenodesis. Strain of muscle(s) and tendon(s) of the rotator cuff of right shoulder, initial encounter [S46.011A]  Referring Physician:  Chuy Mcneal MD MD Orders: eval & treat, home exercise program and Full Motion, Full Strength, Aggressive; 2x/week x6 weeks. (10/11/22)   Return MD Appt:    Date of Onset:  Onset Date: 22 (surgery; 22 injury)      OBJECTIVE   Observation/Orthostatic Postural Assessment: Walks into the clinic with R UE in sling, no significant distress.    ROM:  PROM RUE (degrees)  R Shoulder Flex  0-180: 170 (forward elevation)  R Shoulder ABduction 0-180: 95 (with scapula stabilized)  R Shoulder Int Rotation  0-70: 70 (stabilized at 45 abd, 60 at 80 abd)  R Shoulder Ext Rotation  0-90: 75 (in shoulder neutral, 90 at 45 and 80 abd)  AROM RUE (degrees)  R Shoulder Flexion 0-180: 160 (forward elevation)  R Shoulder Int Rotation  0-70: 40 (in abduction; to T9 behind back.)  R Shoulder Ext Rotation 0-90: 70 (by side, 80 deg when in abduction)  Strength:   R Shoulder: weak with pain to ER, abd/flex, strong and painless to remainder with Soft Tissue Differential testing; 4+ to flex, abd, ER, and IR with manual testing  ASSESSMENT   Progress Assessment: Ms. Chiquis Song is now 4 months s/p arthroscopy of right shoulder with arthroscopic subacromial decompression, arthroscopic distal clavicle resection, extensive debridement of a type 2 SLAP tear, biceps labral complex, glenohumeral joint, subacromial space, mini-open rotator cuff repair for a 3 cm full-thickness subscapularis and infraspinatus rotator cuff tear, and biceps tenodesis on 6/17/22. She continues to make steady progress. She continues to have pain to the shoulder. She has good R ROM . The shoulder is weak but is progressing. We will continue to work on her existing treatment plan to further progress toward her goals. Problem List: (Impacting functional limitations): Body Structures, Functions, Activity Limitations Requiring Skilled Therapeutic Intervention: Decreased ROM; Decreased tolerance to work activity; Increased pain      Therapy Prognosis:   Therapy Prognosis: Good     PLAN   Effective Dates: 9/29/22 TO Plan of Care/Certification Expiration Date: 12/28/22     Frequency/Duration: Plan Frequency: 2x/week for an additional 12 weeks     Interventions Planned (Treatment may consist of any combination of the following):    Current Treatment Recommendations: ROM; Manual Therapy - Soft Tissue Mobilization; Manual Therapy - Joint Manipulation; Pain management; Home exercise program; Modalities; progressing to Strengthening when appropriate. Goals: (Goals have been discussed and agreed upon with patient.)  Short-Term Functional Goals: Time Frame: 4-6 weeks  Report no more than 5/10 intermittent pain to R shoulder with compensatory use during basic functional activities, and score less than 50% on the DASH.   Met 9/29/22  R shoulder PROM forward elevation greater than 135 degrees and external rotation greater than 60 degrees to progress into functional ranges. Met 9/2/22  Demonstrate good R shoulder isometric strength with manual testing to progress into strength phase. Met 8/1/22  Independent with initial HEP. Met 8/1/22  Discharge Goals: Time Frame:12 weeks  No more than 3/10 intermittent pain R shoulder with return to normalized household and work-related activities, and score less than 30% on the DASH. Progressed, ongoing 10/24/22  R shoulder AROM forward elevation greater than 135 degrees, external rotation greater than 60 degrees, and strength to shoulder are grossly WNL's for safe use with normalized activities. Met 10/24/22  Demonstrate good functional shoulder strength and endurance for return to normalized household and work activities. Progressed, ongoing 10/24/22   Independent with advanced shoulder HEP for continued self-management. Progressed, ongoing 10/2422       Outcome Measure: Tool Used: Disabilities of the Arm, Shoulder and Hand (DASH) Questionnaire - Quick Version  Score  Initial: 44/55 or 75% disability Most Recent: 29/55 or 41% disability (9/29/22)   Interpretation of Score: The DASH is designed to measure the activities of daily living in person's with upper extremity dysfunction or pain. Each section is scored on a 1-5 scale, 5 representing the greatest disability. The scores of each section are added together for a total score of 55. Medical Necessity:   She is 4 months post op R shoulder. Impairments listed above are limiting comfort and basic functional use her R/dominant UE. She is progressing. Reason For Services/Other Comments:  Patient continues to require skilled intervention due to the complexity fo the surgical procedure and need for safe, progressive rehab to return to functional use of her R/dominant UE. Co-morbid cervical pain, strain and L shoulder pain and cuff pathology and history of CRPS to B LE will mostly likely make progress slower. Johnna Marley, PT , MSPT, OCS           Post Session Pain  Charge Capture  PT Visit Info  POC Link  Treatment Note Link  MD Guidelines  [place]

## 2022-10-27 ENCOUNTER — HOSPITAL ENCOUNTER (OUTPATIENT)
Dept: PHYSICAL THERAPY | Age: 37
Setting detail: RECURRING SERIES
Discharge: HOME | End: 2022-10-30

## 2022-10-27 PROCEDURE — 97110 THERAPEUTIC EXERCISES: CPT

## 2022-10-27 NOTE — PROGRESS NOTES
Kim Ryan  : 1985  Primary: Joselyn Allison  Secondary:  [address] @ [address] [address]  Phone: [phone]  Fax: [phone] Plan Frequency: 2x/week for an additional 12 weeks    Plan of Care/Certification Expiration Date: 22      PT Visit Info: Total # of Visits to Date: 39     Visit Count:  39   OUTPATIENT PHYSICAL THERAPY:OP NOTE TYPE: Treatment Note 10/27/2022       Episode  Appt Desk             Treatment Diagnosis:  Pain in Right Shoulder (M25.511)  Stiffness of Right Shoulder, Not elsewhere classified (M25.611)   Strain of muscle(s) and tendon(s) of the rotator cuff of right shoulder, sequela (S46.011S)  Medical/Referring Diagnosis: s/p  Arthroscopy of right shoulder, arthroscopic subacromial decompression, arthroscopic distal clavicle resection, extensive debridement of SLAP tear, biceps labral complex, glenohumeral joint, subacromial space, mini-open rotator cuff repair and biceps tenodesis. Strain of muscle(s) and tendon(s) of the rotator cuff of right shoulder, initial encounter [S46.011A]  Referring Physician:  Nereida Estrada MD MD Orders:  eval & treat, home exercise program and Full Motion, Full Strength, Aggressive; 2x/week x6 weeks. (10/11/22)   Date of Onset:  Onset Date: 22 (surgery; 22 injury)  Allergies: Duloxetine and Erythromycin  Restrictions/Precautions: Post op restrictions and precautions R shoulder. Interventions Planned (Treatment may consist of any combination of the following):    Current Treatment Recommendations: Strengthening; ROM; Manual Therapy - Soft Tissue Mobilization; Manual Therapy - Joint Manipulation; Pain management; Home exercise program; Modalities     Subjective Comments:  Pt reports pain and soreness in the anterior shoulder from exercise.   Initial:     mild to moderate pain in R shoulder Post Session:        no increase post modalities  Medications Last Reviewed: Naproxen; gregg 10/27/2022  Updated Objective Findings:    ROM:     Strength:   R Shoulder: weak with pain to ER, abd/flex, strong and painless to remainder with Soft Tissue Differential testing; 4+ to flex, abd, ER, and IR with manual testing  Treatment   Active warm up on UBE. THERAPEUTIC EXERCISE: (40 minutes): Exercises for R shoulder motion with active Muscle Balance moves for upper quarter, including standing with back against wall bilateral ER in shoulder neutral and in abduction (close to 90 deg as possible), IR in abduction, facing wall bilat ER at 90 deg flexion, facing wall bilat forearm wall slide, 3\"x10 each with emphasis on stable spine/scapula. Exercises for shoulder girdle strength and stabilization done as per grid. Verbal and tactile cues for these as needed for correct mechanics. MODALITIES: (15 minutes, un-billable): Cold and compression for post treatment soreness to R shoulder using GameReady, low pressure, 40-deg F, x15' while in sitting. Good relief with this.    Date:  10/13/22 Date:  10/17/22 Date:  10/20/22 Date  10/24/22 Date  10/27/22   Activity/Exercise Parameters Parameters Parameters     UBE L1 x5' x10' X 5 min L 1 x10' L3 x 5 min   ROM/Flexibility Assisted as above At wall as above See above As above    Rhythmic Stabilization: - Red Flex bar oscil   Shoulder neutral 3x20-30\" ea;  Wall dribble  500-g ball  12 o'clock   3x30 each  Red Flex bar oscil   Shoulder neutral, ER by side 1x30\" ea;  Wall dribble  1-kg ball  12 o'clock   3x30 each Red flex bar oscillations    Shoulder neutral 30 sec x 2  Flexion at 90 30 sec x 3    Wall dribble with 1 kg ball 12 o'clock 3 x30 each B shoulders   Strength:   Shoulder ext Prone unilat  1# 1x10 -  -    Horiz abd/Middle Trap Prone unilat  1# 1x10 -  -    Scaption/Lower Trap Prone unilat  1# 1x10 -  -    ER/IR-- 90 deg Prone unilat  1# 1x10 ea -  -    Serratus Press Supine press, bilat  3# 1x15 -  -    Row Bent row  3# 1x15 ea -  -    ER -- plane of scapula 2# 1x15 ea -  -    Overhead press 45-deg incline press, bilat  2# 1x15 -  45-deg incline  3#+green band pull apart  2x10 45 degree incline 3# B with green band pull apart 2x10   CKC UE  Wall push  Scap abd/add x10,  Shoulder Tap moving L only  2x5 Wall push up with scapula retraction 2x10    Shoulder tap moving 2x5 Quadruped   Respiratory belly lift bilat UE 1x5 breath,  R UE only 2x5 breath    Quadruped shoulder taps  1x5 Quadriped respiratory bell lift B UE  X10    Quadriped shoulder taps x10     HEP: She is to work on her HEP. She verbalizes understanding. [employer] Portal  7/5/22: [id number]  10/13/22: [id number] Initial scapulo-cuff strrength    Treatment/Session Summary:    Treatment Assessment:  Pt demonstrates improved body mechanics and requires less cueing to depress the R scapula. She has difficulty controlling extension during the over head press. Communication/Consultation:  None today  Equipment provided today:  None  Recommendations/Intent for next treatment session: We will continue to progress R shoulder motion and re-strengthening as pain allows. Continue with modalities as needed. Review updated HEP.      Total Treatment Billable Duration: 40 minutes   Time In: 8286  Time Out: 1445    Rhoda Ochoa PTA       Charge Capture Post Session Pain  PT Visit Info  Eachpal Portal  MD Guidelines  Scanned Media  Benefits  MyChart    Future Appointments   Date Time Provider Nader Jameson   10/31/2022  1:45 PM Rhoda Ochoa PTA [facility]   11/3/2022  1:45 PM Celester Gantaras, PT [facility]   11/7/2022  2:30 PM Rhoda Ochoa PTA [facility]   11/10/2022  1:45 PM Celester Ganong, PT [facility]   11/14/2022  1:45 PM Rhoda Ochoa PTA [facility]   11/17/2022  3:15 PM Celester Ganong, PT [facility]   11/28/2022  1:45 PM Rhoda Ochoa PTA [facility]   12/1/2022  3:15 PM Celester Gantaras, PT [facility]   12/5/2022  1:45 PM Tano Potter PT [facility]   12/8/2022  1:45 PM Maggie Fuentes, PT [facility]   12/13/2022  1:45 PM Maggie Fuentes, PT [facility]   12/13/2022  2:45 PM Ryanne Murry., MD [facility]

## 2022-10-31 ENCOUNTER — HOSPITAL ENCOUNTER (OUTPATIENT)
Dept: PHYSICAL THERAPY | Age: 37
Setting detail: RECURRING SERIES
End: 2022-10-31

## 2022-11-03 ENCOUNTER — HOSPITAL ENCOUNTER (OUTPATIENT)
Dept: PHYSICAL THERAPY | Age: 37
Setting detail: RECURRING SERIES
Discharge: HOME | End: 2022-11-06
Payer: COMMERCIAL

## 2022-11-03 PROCEDURE — 97110 THERAPEUTIC EXERCISES: CPT

## 2022-11-03 NOTE — PROGRESS NOTES
Lizzy Hernandez  : 1985  Primary: Syd Kofimario Sc  Secondary:  [address] @ [address] [address]  Phone: [phone]  Fax: [phone] Plan Frequency: 2x/week for an additional 12 weeks    Plan of Care/Certification Expiration Date: 22      PT Visit Info: Total # of Visits to Date: 40     Visit Count:  40   OUTPATIENT PHYSICAL THERAPY:OP NOTE TYPE: Treatment Note 11/3/2022       Episode  Appt Desk             Treatment Diagnosis:  Pain in Right Shoulder (M25.511)  Stiffness of Right Shoulder, Not elsewhere classified (M25.611)   Strain of muscle(s) and tendon(s) of the rotator cuff of right shoulder, sequela (S46.011S)  Medical/Referring Diagnosis: s/p  Arthroscopy of right shoulder, arthroscopic subacromial decompression, arthroscopic distal clavicle resection, extensive debridement of SLAP tear, biceps labral complex, glenohumeral joint, subacromial space, mini-open rotator cuff repair and biceps tenodesis. Strain of muscle(s) and tendon(s) of the rotator cuff of right shoulder, initial encounter [S46.011A]  Referring Physician:  Emery Mary MD MD Orders:  eval & treat, home exercise program and Full Motion, Full Strength, Aggressive; 2x/week x6 weeks. (10/11/22)   Date of Onset:  Onset Date: 22 (surgery; 22 injury)  Allergies: Duloxetine and Erythromycin  Restrictions/Precautions: Post op restrictions and precautions R shoulder. Interventions Planned (Treatment may consist of any combination of the following):    Current Treatment Recommendations: Strengthening; ROM; Manual Therapy - Soft Tissue Mobilization; Manual Therapy - Joint Manipulation; Pain management; Home exercise program; Modalities     Subjective Comments: Says she has had a rough week with home and car issues. Has been doing her HEP. Shoulder continues to be sore. Has had increased pain due to being off her muscle relaxor pain meds.  She is to get a new prescription soon. Pain disrupts sleep. Initial:       Post Session:         Medications Last Reviewed: Naproxen; gabapen 11/3/2022  Updated Objective Findings:    ROM:   Not measured. Strength:   Not measured. Treatment   Active warm up on UBE. THERAPEUTIC EXERCISE: (50 minutes): Exercises for R shoulder motion with active Muscle Balance moves for upper quarter, including standing with back against wall bilateral ER in shoulder neutral and in abduction (close to 90 deg as possible), IR in abduction, facing wall bilat ER at 90 deg flexion, facing wall bilat forearm wall slide, back to wall unilat shoulder flexion, 3\"x10 each with emphasis on stable spine/scapula and manual end range over-pressure to each. Active reciprocal inhibition R shoulder ER 90/90, furll shoulder flexion at wall, and behind back lift off, 10\"x5 each. Exercises for shoulder girdle strength and stabilization done as per grid. Verbal and tactile cues for these as needed for correct mechanics. MODALITIES: (15 minutes, un-billable): Cold and compression for post treatment soreness to R shoulder using GameReady, low pressure, 40-deg F, x15' while in sitting. Good relief with this.    Date:  10/13/22 Date:  10/17/22 Date:  10/20/22 Date  10/24/22 Date  10/27/22 Date  11/3/22   Activity/Exercise Parameters Parameters Parameters      UBE L1 x5' x10' X 5 min L 1 x10' L3 x 5 min L. 3 x10'   ROM/Flexibility Assisted as above At wall as above See above As above  As above    Rhythmic Stabilization: - Red Flex bar oscil   Shoulder neutral 3x20-30\" ea;  Wall dribble  500-g ball  12 o'clock   3x30 each  Red Flex bar oscil   Shoulder neutral, ER by side 1x30\" ea;  Wall dribble  1-kg ball  12 o'clock   3x30 each Red flex bar oscillations    Shoulder neutral 30 sec x 2  Flexion at 90 30 sec x 3    Wall dribble with 1 kg ball 12 o'clock 3 x30 each B shoulders Red Flex bar oscil   Shoulder neutral, ER by side, 90-deg scaption  2x30\" ea   Strength: Shoulder ext Prone unilat  1# 1x10 -  -  Standing bilat  7# cable  3x15   Horiz abd/Middle Trap Prone unilat  1# 1x10 -  -  Prone bilat  1# 2x10   Scaption/Lower Trap Prone unilat  1# 1x10 -  -  Prone bilat  1# 2x10   ER/IR-- 90 deg Prone unilat  1# 1x10 ea -  -  -   Serratus Press Supine press, bilat  3# 1x15 -  -  Bench press  3# 2x15   Row Bent row  3# 1x15 ea -  -  Bent over   5# 2x15 ea   ER -- plane of scapula 2# 1x15 ea -  -  3# 2x15 ea   Overhead press 45-deg incline press, bilat  2# 1x15 -  45-deg incline  3#+green band pull apart  2x10 45 degree incline 3# B with green band pull apart 2x10 Standing  Landmine press with 3# bar  2x10   Pull down      Sitting bilat  10# cable  2x15   Forward Elevation      unilat scaption   To 90-deg   1# 2x10 ea   CKC UE  Wall push  Scap abd/add x10,  Shoulder Tap moving L only  2x5 Wall push up with scapula retraction 2x10    Shoulder tap moving 2x5 Quadruped   Respiratory belly lift bilat UE 1x5 breath,  R UE only 2x5 breath    Quadruped shoulder taps  1x5 Quadriped respiratory bell lift B UE  X10    Quadriped shoulder taps x10 -     HEP: She is to work on her HEP. She verbalizes understanding. [employer] Portal  7/5/22: [id number]  10/13/22: [id number] Initial scapulo-cuff strrength    Treatment/Session Summary:    Treatment Assessment: Very good effort and performance of the exercises done today. Shoulder AROM and strength is progressing. Pain persists. Communication/Consultation:  None today  Equipment provided today:  None  Recommendations/Intent for next treatment session: We will continue to progress R shoulder motion and re-strengthening as pain allows. Continue with modalities as needed. Review updated HEP.      Total Treatment Billable Duration: 50 minutes   Time In: 4241  Time Out: Nicholas JOSE Irving 106, PT       Charge Capture Post Session Pain  PT Visit Info  MiNOWireless  MD Guidelines  Scanned Media  Benefits  Fondeadorahart    Future Appointments   Date Time Provider Nader Jameson   11/7/2022  2:30 PM Rosi Haq, [place] [facility]   11/10/2022  1:45 PM Sahara Pal, PT [facility]   11/14/2022  1:45 PM Rosi Haq, PTA [facility]   11/17/2022  3:15 PM Sahara Pal, PT [facility]   11/28/2022  [facility]   12/1/2022  3:15 PM [facility]   12/5/2022  1:45 PM [facility]   12/8/2022  1:45 PM [facility]   12/13/2022  1:45 PM [facility]   12/13/2022  2:45 PM MD BUBBA Flores [facility]

## 2022-11-07 ENCOUNTER — HOSPITAL ENCOUNTER (OUTPATIENT)
Dept: PHYSICAL THERAPY | Age: 37
Setting detail: RECURRING SERIES
Discharge: HOME | End: 2022-11-10
Payer: COMMERCIAL

## 2022-11-07 PROCEDURE — 97110 THERAPEUTIC EXERCISES: CPT

## 2022-11-07 NOTE — PROGRESS NOTES
Shayan Cantor  : 1985  Primary: Isma Flow Sc  Secondary:  [address] @ [address] [address]  Phone: [phone]  Fax: [phone] Plan Frequency: 2x/week for an additional 12 weeks    Plan of Care/Certification Expiration Date: 22      PT Visit Info: Total # of Visits to Date: 45     Visit Count:  45   OUTPATIENT PHYSICAL THERAPY:OP NOTE TYPE: Treatment Note 2022       Episode  Appt Desk             Treatment Diagnosis:  Pain in Right Shoulder (M25.511)  Stiffness of Right Shoulder, Not elsewhere classified (M25.611)   Strain of muscle(s) and tendon(s) of the rotator cuff of right shoulder, sequela (S46.011S)  Medical/Referring Diagnosis: s/p  Arthroscopy of right shoulder, arthroscopic subacromial decompression, arthroscopic distal clavicle resection, extensive debridement of SLAP tear, biceps labral complex, glenohumeral joint, subacromial space, mini-open rotator cuff repair and biceps tenodesis. Strain of muscle(s) and tendon(s) of the rotator cuff of right shoulder, initial encounter [S46.011A]  Referring Physician:  Althea Fournier MD MD Orders:  eval & treat, home exercise program and Full Motion, Full Strength, Aggressive; 2x/week x6 weeks. (10/11/22)   Date of Onset:  Onset Date: 22 (surgery; 22 injury)  Allergies: Duloxetine and Erythromycin  Restrictions/Precautions: Post op restrictions and precautions R shoulder. Interventions Planned (Treatment may consist of any combination of the following):    Current Treatment Recommendations: Strengthening; ROM; Manual Therapy - Soft Tissue Mobilization; Manual Therapy - Joint Manipulation; Pain management; Home exercise program; Modalities     Subjective Comments:  Pt states \"I woke up this morning laying on the right shoulder. I was sore after therapy last visit. \"  Initial:     no number given Post Session:       no increase reported  Medications Last Reviewed: Naproxen; gregg 11/7/2022  Updated Objective Findings:    ROM:   Not measured. Strength:   Not measured. Treatment   Active warm up on UBE. (5 min)  THERAPEUTIC EXERCISE: (25 minutes): Exercises for R shoulder motion and to increase ROM. Pt was supine with the scapula stabilized for all motions. Stretching into flexion, scaption, IR and ER (at 90 degrees of abduction for each). Exercises for shoulder girdle strength and stabilization done as per grid. Verbal and tactile cues for these as needed for correct mechanics. MODALITIES: (15 minutes, un-billable): Cold and compression for post treatment soreness to R shoulder using GameReady, low pressure, 40-deg F, x15' while in sitting. Good relief with this.    Date:  10/13/22 Date:  10/17/22 Date:  10/20/22 Date  10/24/22 Date  10/27/22 Date  11/3/22 Date  11/7/22   Activity/Exercise Parameters Parameters Parameters       UBE L1 x5' x10' X 5 min L 1 x10' L3 x 5 min L. 3 x10' Level 3 x 5 min   ROM/Flexibility Assisted as above At wall as above See above As above  As above  See above   Rhythmic Stabilization: - Red Flex bar oscil   Shoulder neutral 3x20-30\" ea;  Wall dribble  500-g ball  12 o'clock   3x30 each  Red Flex bar oscil   Shoulder neutral, ER by side 1x30\" ea;  Wall dribble  1-kg ball  12 o'clock   3x30 each Red flex bar oscillations    Shoulder neutral 30 sec x 2  Flexion at 90 30 sec x 3    Wall dribble with 1 kg ball 12 o'clock 3 x30 each B shoulders Red Flex bar oscil   Shoulder neutral, ER by side, 90-deg scaption  2x30\" ea    Strength:   Shoulder ext Prone unilat  1# 1x10 -  -  Standing bilat  7# cable  3x15    Horiz abd/Middle Trap Prone unilat  1# 1x10 -  -  Prone bilat  1# 2x10    Scaption/Lower Trap Prone unilat  1# 1x10 -  -  Prone bilat  1# 2x10    ER/IR-- 90 deg Prone unilat  1# 1x10 ea -  -  -    Serratus Press Supine press, bilat  3# 1x15 -  -  Bench press  3# 2x15    Row Bent row  3# 1x15 ea -  -  Bent over   5# 2x15 ea    ER -- plane of scapula 2# 1x15 ea -  -  3# 2x15 ea    Overhead press 45-deg incline press, bilat  2# 1x15 -  45-deg incline  3#+green band pull apart  2x10 45 degree incline 3# B with green band pull apart 2x10 Standing  Landmine press with 3# bar  2x10    Pull down      Sitting bilat  10# cable  2x15    Forward Elevation      unilat scaption   To 90-deg   1# 2x10 ea    CKC UE  Wall push  Scap abd/add x10,  Shoulder Tap moving L only  2x5 Wall push up with scapula retraction 2x10    Shoulder tap moving 2x5 Quadruped   Respiratory belly lift bilat UE 1x5 breath,  R UE only 2x5 breath    Quadruped shoulder taps  1x5 Quadriped respiratory bell lift B UE  X10    Quadriped shoulder taps x10 -      HEP: She is to work on her HEP. She verbalizes understanding. [employer] Portal  7/5/22: [id number]  10/13/22: [id number] Initial scapulo-cuff strrength    Treatment/Session Summary:    Treatment Assessment:  Pt has full ROM in supine and is making good progress with AROM. Will increase strengthening exercises next visit. Communication/Consultation:  None today  Equipment provided today:  None  Recommendations/Intent for next treatment session: We will continue to progress R shoulder motion and re-strengthening as pain allows. Continue with modalities as needed. Review updated HEP.      Total Treatment Billable Duration:  25 minutes (pt was 15 minutes late due to traffic)  Time In: 026 848 14 90  Time Out: [facility] [facility],[address] Floor, hospitals       Charge Capture Post Session Pain  PT Visit Info  [employer] Portal  MD Guidelines  Scanned Media  Benefits  MyChart    Future Appointments   Date Time Provider Nader Jameson   11/10/2022 11:15 AM Willie Rosenthal PTA [facility]   11/14/2022  1:45 PM [facility]   11/17/2022  3:15 PM SHANNEN Juárez [facility]   11/28/2022  1:45 PM DERRICK Lovett [facility]   12/1/2022  3:15 PM Phlily Bal PT [facility]   12/5/2022  1:45 PM SHANNEN Juárez O 12/8/2022  1:45 PM Leandra Oneil PT [facility]PTGIANNA [facility]   12/13/2022  1:45 PM Leandra Oneil PT SFNADIA [facility]   12/13/2022  2:45 PM MD BUBBA Weston [facility]

## 2022-11-10 ENCOUNTER — HOSPITAL ENCOUNTER (OUTPATIENT)
Dept: PHYSICAL THERAPY | Age: 37
Setting detail: RECURRING SERIES
Discharge: HOME | End: 2022-11-13
Payer: COMMERCIAL

## 2022-11-10 PROCEDURE — 97110 THERAPEUTIC EXERCISES: CPT

## 2022-11-10 NOTE — PROGRESS NOTES
Velma Byers  : 1985  Primary: Hue Yung Sc  Secondary:  [address] @ [address] [address]  Phone: [phone]  Fax: [phone] Plan Frequency: 2x/week for an additional 12 weeks    Plan of Care/Certification Expiration Date: 22      PT Visit Info: Total # of Visits to Date: 44     Visit Count:  44   OUTPATIENT PHYSICAL THERAPY:OP NOTE TYPE: Treatment Note 11/10/2022       Episode  Appt Desk             Treatment Diagnosis:  Pain in Right Shoulder (M25.511)  Stiffness of Right Shoulder, Not elsewhere classified (M25.611)   Strain of muscle(s) and tendon(s) of the rotator cuff of right shoulder, sequela (S46.011S)  Medical/Referring Diagnosis: s/p  Arthroscopy of right shoulder, arthroscopic subacromial decompression, arthroscopic distal clavicle resection, extensive debridement of SLAP tear, biceps labral complex, glenohumeral joint, subacromial space, mini-open rotator cuff repair and biceps tenodesis. Strain of muscle(s) and tendon(s) of the rotator cuff of right shoulder, initial encounter [S46.011A]  Referring Physician:  Felipa Gregory MD MD Orders:  eval & treat, home exercise program and Full Motion, Full Strength, Aggressive; 2x/week x6 weeks. (10/11/22)   Date of Onset:  Onset Date: 22 (surgery; 22 injury)  Allergies: Duloxetine and Erythromycin  Restrictions/Precautions: Post op restrictions and precautions R shoulder. Interventions Planned (Treatment may consist of any combination of the following):    Current Treatment Recommendations: Strengthening; ROM; Manual Therapy - Soft Tissue Mobilization; Manual Therapy - Joint Manipulation; Pain management; Home exercise program; Modalities     Subjective Comments: Pt states \"The shoulder feels better. \"  Initial:     (mild pain)  Post Session:       no increase reported  Medications Last Reviewed: Naproxen; gabapen 11/10/2022  Updated Objective Findings:    ROM:   Not measured. Strength:   Not measured. Treatment   Active warm up on UBE. (5 min)  THERAPEUTIC EXERCISE: (40 minutes): Exercises for R shoulder motion and to increase ROM. Pt was supine with the scapula stabilized for all motions. Pt had her back against wall to stabilize scapula- flexion 2x10. ER by side 2x10 and ER/IR at 90 degrees of abduction 2x10. Exercises for shoulder girdle strength and stabilization done as per grid. Verbal and tactile cues for these as needed for correct mechanics. MODALITIES: (15 minutes, un-billable): Cold and compression for post treatment soreness to R shoulder using GameReady, low pressure, 40-deg F, x15' while in sitting. Good relief with this.    Date:  10/13/22 Date:  10/17/22 Date:  10/20/22 Date  10/24/22 Date  10/27/22 Date  11/3/22 Date  11/7/22 Date  11/10/22   Activity/Exercise Parameters Parameters Parameters        UBE L1 x5' x10' X 5 min L 1 x10' L3 x 5 min L. 3 x10' Level 3 x 5 min Level 3 x 5 min   ROM/Flexibility Assisted as above At wall as above See above As above  As above  See above See above   Rhythmic Stabilization: - Red Flex bar oscil   Shoulder neutral 3x20-30\" ea;  Wall dribble  500-g ball  12 o'clock   3x30 each  Red Flex bar oscil   Shoulder neutral, ER by side 1x30\" ea;  Wall dribble  1-kg ball  12 o'clock   3x30 each Red flex bar oscillations    Shoulder neutral 30 sec x 2  Flexion at 90 30 sec x 3    Wall dribble with 1 kg ball 12 o'clock 3 x30 each B shoulders Red Flex bar oscil   Shoulder neutral, ER by side, 90-deg scaption  2x30\" ea     Strength:   Shoulder ext Prone unilat  1# 1x10 -  -  Standing bilat  7# cable  3x15  Standing 7# 3x10 cable   Horiz abd/Middle Trap Prone unilat  1# 1x10 -  -  Prone bilat  1# 2x10  Prone B 1# 2x10   Scaption/Lower Trap Prone unilat  1# 1x10 -  -  Prone bilat  1# 2x10  Prone B 1# 2x10   ER/IR-- 90 deg Prone unilat  1# 1x10 ea -  -  -     Serratus Press Supine press, bilat  3# 1x15 -  -  Bench press  3# 2x15  Bench press 5# 2x10   Row Bent row  3# 1x15 ea -  -  Bent over   5# 2x15 ea  Prone on bench 5# 3x10    Standing at cable 2x10, 10#   ER -- plane of scapula 2# 1x15 ea -  -  3# 2x15 ea     Overhead press 45-deg incline press, bilat  2# 1x15 -  45-deg incline  3#+green band pull apart  2x10 45 degree incline 3# B with green band pull apart 2x10 Standing  Landmine press with 3# bar  2x10     Pull down      Sitting bilat  10# cable  2x15     Forward Elevation      unilat scaption   To 90-deg   1# 2x10 ea     CKC UE  Wall push  Scap abd/add x10,  Shoulder Tap moving L only  2x5 Wall push up with scapula retraction 2x10    Shoulder tap moving 2x5 Quadruped   Respiratory belly lift bilat UE 1x5 breath,  R UE only 2x5 breath    Quadruped shoulder taps  1x5 Quadriped respiratory bell lift B UE  X10    Quadriped shoulder taps x10 -       HEP: She is to work on her HEP. She verbalizes understanding. [employer] Portal  7/5/22: [id number]  10/13/22: [id number] Initial scapulo-cuff strrength    Treatment/Session Summary:    Treatment Assessment: Pt did not report increased pain, just fatigue. She is making good progress with AROM. Communication/Consultation:  None today  Equipment provided today:  None  Recommendations/Intent for next treatment session: We will continue to progress R shoulder motion and re-strengthening as pain allows. Continue with modalities as needed. Review updated HEP.      Total Treatment Billable Duration:  40 minutes (pt was 15 minutes late due to traffic)  Time In: 1115  Time Out: DERRICK Romero       Charge Capture Post Session Pain  PT Visit [facility] Portal  MD Guidelines  Scanned Media  Benefits  MyChart    Future Appointments   Date Time Provider Nader Jameson   11/14/2022  1:45 PM Anand Pop PTA [facility]   11/17/2022  3:15 [facility]   11/28/2022  1:45 PM [facility]   12/1/2022  3:15 PM Leandra Oneil PT [facility]FORPT[facility]   12/5/2022  1:45 PM Dre Godinez, PT [facility] O   12/8/2022  1:45 PM Dre Godinez, PT [facility]   12/13/2022  1:45 PM Dre Godinez, PT [facility]   12/13/2022  2:45 PM MD BUBBA Lao [facility]

## 2022-11-14 ENCOUNTER — HOSPITAL ENCOUNTER (OUTPATIENT)
Dept: PHYSICAL THERAPY | Age: 37
Setting detail: RECURRING SERIES
Discharge: HOME | End: 2022-11-17
Payer: COMMERCIAL

## 2022-11-14 PROCEDURE — 97110 THERAPEUTIC EXERCISES: CPT

## 2022-11-14 ASSESSMENT — PAIN SCALES - GENERAL: PAINLEVEL_OUTOF10: 6

## 2022-11-17 ENCOUNTER — HOSPITAL ENCOUNTER (OUTPATIENT)
Dept: PHYSICAL THERAPY | Age: 37
Setting detail: RECURRING SERIES
Discharge: HOME | End: 2022-11-20
Payer: COMMERCIAL

## 2022-11-17 PROCEDURE — 97110 THERAPEUTIC EXERCISES: CPT

## 2022-11-17 NOTE — PROGRESS NOTES
Katie Childs  : 1985  Primary: Bridgerhectorluis Janet Allison  Secondary:  [address] @ [address] [address]  Phone: [phone]  Fax: [phone] Plan Frequency: 2x/week for an additional 12 weeks    Plan of Care/Certification Expiration Date: 22      PT Visit Info: Total # of Visits to Date: 39  Progress Note Counter: 6     Visit Count:  41   OUTPATIENT PHYSICAL THERAPY:OP NOTE TYPE: Treatment Note 2022       Episode  Appt Desk             Treatment Diagnosis:  Pain in Right Shoulder (M25.511)  Stiffness of Right Shoulder, Not elsewhere classified (M25.611)   Strain of muscle(s) and tendon(s) of the rotator cuff of right shoulder, sequela (S46.011S)  Medical/Referring Diagnosis: s/p  Arthroscopy of right shoulder, arthroscopic subacromial decompression, arthroscopic distal clavicle resection, extensive debridement of SLAP tear, biceps labral complex, glenohumeral joint, subacromial space, mini-open rotator cuff repair and biceps tenodesis. Strain of muscle(s) and tendon(s) of the rotator cuff of right shoulder, initial encounter [S46.011A]  Referring Physician:  Chuy Mcneal MD MD Orders:  eval & treat, home exercise program and Full Motion, Full Strength, Aggressive; 2x/week x6 weeks. (10/11/22)   Date of Onset:  Onset Date: 22 (surgery; 22 injury)  Allergies: Duloxetine and Erythromycin  Restrictions/Precautions: Post op restrictions and precautions R shoulder. Interventions Planned (Treatment may consist of any combination of the following):    Current Treatment Recommendations: Strengthening; ROM; Manual Therapy - Soft Tissue Mobilization; Manual Therapy - Joint Manipulation; Pain management; Home exercise program; Modalities     Subjective Comments: Says her shoulder has been very sore the past few days with the cold weather that moved. Initial:  No VAS. Post Session:       No VAS.   Medications Last Reviewed: Naproxen; gabapen 11/17/2022  Updated Objective Findings:    ROM:   Not measured. Strength:   Not measured. Treatment   Active warm up on UBE. THERAPEUTIC EXERCISE: (40 minutes): Exercises for R shoulder motion with active Muscle Balance moves for upper quarter, including standing with back against wall bilateral ER in shoulder neutral, ER and IR in abduction close to 90 deg as possible, facing wall bilat ER at 90 deg flexion, facing wall bilat forearm wall slide, back to wall unilat shoulder flexion--3\"x10 each with emphasis on stable spine/scapula and manual end range over-pressure to each. Active reciprocal inhibition R shoulder ER 90/90, full shoulder flexion at wall, 10\"x5 each. Exercises for shoulder girdle strength and stabilization done as per grid with emphasis on closed kinetic chain exercises. Verbal and tactile cues for these as needed for correct mechanics.     Date  11/3/22 Date  11/7/22 Date  11/10/22 Date  11/14/22 Date  11/17/22   Activity/Exercise        UBE L. 3 x10' Level 3 x 5 min Level 3 x 5 min 5 min On own   ROM/Flexibility As above  See above See above Wall slides x15, 4 way As above   Rhythmic Stabilization: Red Flex bar oscil   Shoulder neutral, ER by side, 90-deg scaption  2x30\" ea   Red flex bar oscillation at 90 degrees of flexion 30 sec hold x 2    Full flexion 30 sec  Standing bilat ER with yellow band oscillation 3x30,  Bilat scaption yellow band oscil 2x5   Strength:   Shoulder ext Standing bilat  7# cable  3x15  Standing 7# 3x10 cable Standing 7# 3x10 cable -   Horiz abd/Middle Trap Prone bilat  1# 2x10  Prone B 1# 2x10 Prone B UE 2# 2x10 -   Scaption/Lower Trap Prone bilat  1# 2x10  Prone B 1# 2x10 Prone 2x10 B 1# -   ER/IR-- 90 deg -    -   Serratus Press Bench press  3# 2x15  Bench press 5# 2x10 Bench press 3x10, 5# -   Row Bent over   5# 2x15 ea  Prone on bench 5# 3x10    Standing at cable 2x10, 10# Cable 10# R UE 3x10 -   ER -- plane of scapula 3# 2x15 ea    -   Overhead press Standing  Landmine press with 3# bar  2x10   Standing landmine press with bar 3x10 -   Pull down Sitting bilat  10# cable  2x15    -   Forward Elevation unilat scaption   To 90-deg   1# 2x10 ea    -   CKC UE -    Quadruped:  Respiratory belly lift with   B UE 1x 5 breath,  L UE only 1x5 breath,  R UE only 1x5 breath;    Quadruped to downward dog  2x5     HEP: She is to ice at home. She can work on her HEP as soreness subsides, and to continue to use her arm with her daily activities as much as possible. She verbalizes understanding. [employer] Portal  7/5/22: [id number]  10/13/22: [id number] Initial scapulo-cuff strrength    Treatment/Session Summary:    Treatment Assessment:    Communication/Consultation:  None today  Equipment provided today:  None  Recommendations/Intent for next treatment session: We will continue to progress R shoulder motion and re-strengthening as pain allows.      Total Treatment Billable Duration:  40 minutes    Laquita Miller, PT       Charge Capture Post Session Pain  PT Visit Info  [employer] Portal  MD Guidelines  Scanned Media  Benefits  MyChart    Future Appointments   Date Time Provider Nader Jameson   11/28/2022  1:45 PM [facility]   12/1/2022  3:15 PM Laquita Miller PT [facility]   12/5/2022  1:45 PM Laquita Miller, PT [facility]   12/8/2022  1:45 PM Laquita Miller, PT [facility]   12/13/2022  1:45 PM Laquita Miller, PT [facility]   12/13/2022  2:45 PM Bello Leone MD POAP [facility]

## 2022-11-28 ENCOUNTER — HOSPITAL ENCOUNTER (OUTPATIENT)
Dept: PHYSICAL THERAPY | Age: 37
Setting detail: RECURRING SERIES
Discharge: HOME | End: 2022-12-01
Payer: COMMERCIAL

## 2022-11-28 PROCEDURE — 97110 THERAPEUTIC EXERCISES: CPT

## 2022-11-28 NOTE — PROGRESS NOTES
Rubia Ramos  : 1985  Primary: Yvonne Balling Sc  Secondary:  [address] @ [address] [address]  Phone: [phone]  Fax: [phone] Plan Frequency: 2x/week for an additional 12 weeks    Plan of Care/Certification Expiration Date: 22      PT Visit Info: Total # of Visits to Date: 43  Progress Note Counter: 7     Visit Count:  43   OUTPATIENT PHYSICAL THERAPY:OP NOTE TYPE: Treatment Note 2022       Episode  Appt Desk             Treatment Diagnosis:  Pain in Right Shoulder (M25.511)  Stiffness of Right Shoulder, Not elsewhere classified (M25.611)   Strain of muscle(s) and tendon(s) of the rotator cuff of right shoulder, sequela (S46.011S)  Medical/Referring Diagnosis: s/p  Arthroscopy of right shoulder, arthroscopic subacromial decompression, arthroscopic distal clavicle resection, extensive debridement of SLAP tear, biceps labral complex, glenohumeral joint, subacromial space, mini-open rotator cuff repair and biceps tenodesis. Strain of muscle(s) and tendon(s) of the rotator cuff of right shoulder, initial encounter [S46.011A]  Referring Physician:  Cary Ham MD MD Orders:  eval & treat, home exercise program and Full Motion, Full Strength, Aggressive; 2x/week x6 weeks. (10/11/22)   Date of Onset:  Onset Date: 22 (surgery; 22 injury)  Allergies: Duloxetine and Erythromycin  Restrictions/Precautions: Post op restrictions and precautions R shoulder. Interventions Planned (Treatment may consist of any combination of the following):    Current Treatment Recommendations: Strengthening; ROM; Manual Therapy - Soft Tissue Mobilization; Manual Therapy - Joint Manipulation; Pain management; Home exercise program; Modalities     Subjective Comments: Pt reports painful clicking in the R shoulder. Initial:  No VAS. Post Session:       No increase reported.   Medications Last Reviewed: Naproxen; gabapen 2022  Updated Objective Findings:    ROM:   Not measured. Strength:   Not measured. Treatment   Active warm up on UBE, 10 minutes. THERAPEUTIC EXERCISE: (40 minutes): Exercises for R shoulder motion with active Muscle Balance moves for upper quarter, including standing with back against wall bilateral ER in shoulder neutral, ER and IR in abduction close to 90 deg as possible, facing wall bilat ER at 90 deg flexion, facing wall bilat forearm wall slide, back to wall unilat shoulder flexion--3\"x10 each with emphasis on stable spine/scapula and manual end range over-pressure to each. Pt required verbal and tactile cues to depress the R scapula. Game[employer] vasopnuematic device (15 minutes unbilled) to R shoulder at coldest setting and low compression.      Date  11/3/22 Date  11/7/22 Date  11/10/22 Date  11/14/22 Date  11/17/22 Date  11/28/22   Activity/Exercise         UBE L. 3 x10' Level 3 x 5 min Level 3 x 5 min 5 min On own On own 10 minutes   ROM/Flexibility As above  See above See above Wall slides x15, 4 way As above See above at wall   Rhythmic Stabilization: Red Flex bar oscil   Shoulder neutral, ER by side, 90-deg scaption  2x30\" ea   Red flex bar oscillation at 90 degrees of flexion 30 sec hold x 2    Full flexion 30 sec  Standing bilat ER with yellow band oscillation 3x30,  Bilat scaption yellow band oscil 2x5    Strength:   Shoulder ext Standing bilat  7# cable  3x15  Standing 7# 3x10 cable Standing 7# 3x10 cable - Prone 2# 2x10 with tactile cues   Horiz abd/Middle Trap Prone bilat  1# 2x10  Prone B 1# 2x10 Prone B UE 2# 2x10 - 1# 2x10 with tactile cues, prone   Scaption/Lower Trap Prone bilat  1# 2x10  Prone B 1# 2x10 Prone 2x10 B 1# - Prone 1# 2x10 with tactile cues   ER/IR-- 90 deg -    -    Serratus Press Bench press  3# 2x15  Bench press 5# 2x10 Bench press 3x10, 5# -    Row Bent over   5# 2x15 ea  Prone on bench 5# 3x10    Standing at cable 2x10, 10# Cable 10# R UE 3x10 - Prone 3# 2x10 with tactile cues   ER -- plane of scapula 3# 2x15 ea    -    Overhead press Standing  Landmine press with 3# bar  2x10   Standing landmine press with bar 3x10 -    Pull down Sitting bilat  10# cable  2x15    -    Forward Elevation unilat scaption   To 90-deg   1# 2x10 ea    -    CKC UE -    Quadruped:  Respiratory belly lift with   B UE 1x 5 breath,  L UE only 1x5 breath,  R UE only 1x5 breath;    Quadruped to downward dog  2x5      HEP: She is to ice at home. She can work on her HEP as soreness subsides, and to continue to use her arm with her daily activities as much as possible. She verbalizes understanding. [employer] Portal  7/5/22: [id number]  10/13/22: [id number] Initial scapulo-cuff strrength    Treatment/Session Summary:    Treatment Assessment: Pt continues to require verbal and tactile cues to depress the scapula. She reported more pain and clicking with activity but also states that she has not been able to do as much exercise due to the holiday. Communication/Consultation:  None today  Equipment provided today:  None  Recommendations/Intent for next treatment session: We will continue to progress R shoulder motion and re-strengthening as pain allows.      Total Treatment Billable Duration:  40 minutes (15 minutes unbilled for gameready)    Time in 13:35  Time out 14: 415 [address]       Charge Capture Post Session Pain  PT Visit [facility]  MD [address]    Future Appointments   Date Time Provider Nader Jameson   12/1/2022  3:15 PM [facility]   12/5/2022  1:45 PM Desiraetine Crandall, PT [facility]   12/8/2022  1:45 PM Chrystine Crandall, PT [facility]   12/13/2022  1:45 PM Chrystine Crandall, PT [facility]   12/13/2022  2:45 PM Chandra Navarrete MD [facility]

## 2022-12-01 ENCOUNTER — HOSPITAL ENCOUNTER (OUTPATIENT)
Dept: PHYSICAL THERAPY | Age: 37
Setting detail: RECURRING SERIES
Discharge: HOME | End: 2022-12-04
Payer: COMMERCIAL

## 2022-12-01 PROCEDURE — 97110 THERAPEUTIC EXERCISES: CPT

## 2022-12-01 ASSESSMENT — PAIN SCALES - GENERAL: PAINLEVEL_OUTOF10: 6

## 2022-12-01 NOTE — PROGRESS NOTES
Reyna William  : 1985  Primary: Virgie Cushing Sc  Secondary:  [address] @ [address] [address]  Phone: [phone]  Fax: [phone] Plan Frequency: 2x/week for an additional 12 weeks    Plan of Care/Certification Expiration Date: 22      PT Visit Info: Total # of Visits to Date: 37  Progress Note Counter: 8     Visit Count:  37   OUTPATIENT PHYSICAL THERAPY:OP NOTE TYPE: Treatment Note 2022       Episode  Appt Desk             Treatment Diagnosis:  Pain in Right Shoulder (M25.511)  Stiffness of Right Shoulder, Not elsewhere classified (M25.611)   Strain of muscle(s) and tendon(s) of the rotator cuff of right shoulder, sequela (S46.011S)  Medical/Referring Diagnosis: s/p  Arthroscopy of right shoulder, arthroscopic subacromial decompression, arthroscopic distal clavicle resection, extensive debridement of SLAP tear, biceps labral complex, glenohumeral joint, subacromial space, mini-open rotator cuff repair and biceps tenodesis. Strain of muscle(s) and tendon(s) of the rotator cuff of right shoulder, initial encounter [S46.011A]  Referring Physician:  Tiffanie Wayne MD MD Orders:  eval & treat, home exercise program and Full Motion, Full Strength, Aggressive; 2x/week x6 weeks. (10/11/22)   Date of Onset:  Onset Date: 22 (surgery; 22 injury)  Allergies: Duloxetine and Erythromycin  Restrictions/Precautions: Post op restrictions and precautions R shoulder. Interventions Planned (Treatment may consist of any combination of the following):    Current Treatment Recommendations: Strengthening; ROM; Manual Therapy - Soft Tissue Mobilization; Manual Therapy - Joint Manipulation; Pain management; Home exercise program; Modalities     Subjective Comments: Pt reports pain and tightness in the anterior shoulder, near the clavicle.   Initial:    6/10 R shoulder  Post Session:       Slight decrease reported  Medications Last Reviewed: Naproxen; gabapen 12/1/2022  Updated Objective Findings:    ROM:   Not measured. Strength:   Not measured. Treatment   Active warm up on UBE, 5 minutes. THERAPEUTIC EXERCISE: (40 minutes): Exercises for R shoulder motion with active Muscle Balance moves for upper quarter, including standing with back against wall bilateral ER in shoulder neutral, ER and IR in abduction close to 90 deg as possible, facing wall bilat ER at 90 deg flexion, facing wall bilat forearm wall slide, back to wall unilat shoulder flexion--3\"x10 each with emphasis on stable spine/scapula and manual end range over-pressure to each. Pt required verbal and tactile cues to depress the R scapula. Gameready vasopnuematic device (15 minutes unbilled) to R shoulder at coldest setting and low compression.      Date  11/3/22 Date  11/7/22 Date  11/10/22 Date  11/14/22 Date  11/17/22 Date  11/28/22 Date  12/1/22   Activity/Exercise          UBE L. 3 x10' Level 3 x 5 min Level 3 x 5 min 5 min On own On own 10 minutes On own 5 min level 3   ROM/Flexibility As above  See above See above Wall slides x15, 4 way As above See above at wall See above   Rhythmic Stabilization: Red Flex bar oscil   Shoulder neutral, ER by side, 90-deg scaption  2x30\" ea   Red flex bar oscillation at 90 degrees of flexion 30 sec hold x 2    Full flexion 30 sec  Standing bilat ER with yellow band oscillation 3x30,  Bilat scaption yellow band oscil 2x5     Strength:   Shoulder ext Standing bilat  7# cable  3x15  Standing 7# 3x10 cable Standing 7# 3x10 cable - Prone 2# 2x10 with tactile cues Prone on bench 3# 2x10 B UE   Horiz abd/Middle Trap Prone bilat  1# 2x10  Prone B 1# 2x10 Prone B UE 2# 2x10 - 1# 2x10 with tactile cues, prone Prone on bench 2x10 B UE 2#   Scaption/Lower Trap Prone bilat  1# 2x10  Prone B 1# 2x10 Prone 2x10 B 1# - Prone 1# 2x10 with tactile cues Prone on bench B UE 1# 2x10   ER/IR-- 90 deg -    -     Serratus Press Bench press  3# 2x15  Bench press 5# 2x10 Bench press 3x10, 5# -  Bench press 3x10, 5#   Row Bent over   5# 2x15 ea  Prone on bench 5# 3x10    Standing at cable 2x10, 10# Cable 10# R UE 3x10 - Prone 3# 2x10 with tactile cues Prone 5# 3x10 B UE   ER -- plane of scapula 3# 2x15 ea    -     Overhead press Standing  Landmine press with 3# bar  2x10   Standing landmine press with bar 3x10 -     Pull down Sitting bilat  10# cable  2x15    -     Forward Elevation unilat scaption   To 90-deg   1# 2x10 ea    -     CKC UE -    Quadruped:  Respiratory belly lift with   B UE 1x 5 breath,  L UE only 1x5 breath,  R UE only 1x5 breath;    Quadruped to downward dog  2x5       HEP: She can work on her HEP as soreness subsides, and to continue to use her arm with her daily activities as much as possible. She verbalizes understanding. [employer] Portal  7/5/22: [id number]  10/13/22: [id number] Initial scapulo-cuff strrength    Treatment/Session Summary:    Treatment Assessment: Pt is progress toward full AROM, but IR is limited. Continue to progress strengthening. Pt reported relief from exercise. Communication/Consultation:  None today  Equipment provided today:  None  Recommendations/Intent for next treatment session: We will continue to progress R shoulder motion and re-strengthening as pain allows.      Total Treatment Billable Duration:  40 minutes (15 minutes unbilled for gameready)  Time in: 15:20  Time out 16:20    Berny Colvin PTA       Charge Capture Post Session Pain  PT Visit [id number] [facility] Portal  MD [address]    Future Appointments   Date Time Provider Nader Jameson   12/5/2022  1:45 PM Mardee Glow [place], PT [facility]   12/8/2022  1:45 PM Cyrus Nguyen PT [facility]   12/13/2022  1:45 PM Cyrus Nguyen PT [facility]   12/13/2022  2:45 PM Warden Svitlana MD POAP [facility]

## 2022-12-05 ENCOUNTER — HOSPITAL ENCOUNTER (OUTPATIENT)
Dept: PHYSICAL THERAPY | Age: 37
Setting detail: RECURRING SERIES
Discharge: HOME | End: 2022-12-08
Payer: COMMERCIAL

## 2022-12-05 PROCEDURE — 97110 THERAPEUTIC EXERCISES: CPT

## 2022-12-05 NOTE — PROGRESS NOTES
Stephanie Cortes  : 1985  Primary: Ronda Oconnor Sc  Secondary:  [address] @ [address] [address]  Phone: [phone]  Fax: [phone] Plan Frequency: 2x/week for an additional 12 weeks    Plan of Care/Certification Expiration Date: 22      PT Visit Info: Total # of Visits to Date: 40  Progress Note Counter: 9     Visit Count:  40   OUTPATIENT PHYSICAL THERAPY:OP NOTE TYPE: Treatment Note 2022       Episode  Appt Desk             Treatment Diagnosis:  Pain in Right Shoulder (M25.511)  Stiffness of Right Shoulder, Not elsewhere classified (M25.611)   Strain of muscle(s) and tendon(s) of the rotator cuff of right shoulder, sequela (S46.011S)  Medical/Referring Diagnosis: s/p  Arthroscopy of right shoulder, arthroscopic subacromial decompression, arthroscopic distal clavicle resection, extensive debridement of SLAP tear, biceps labral complex, glenohumeral joint, subacromial space, mini-open rotator cuff repair and biceps tenodesis. Strain of muscle(s) and tendon(s) of the rotator cuff of right shoulder, initial encounter [S46.011A]  Referring Physician:  Miles Pedroza MD MD Orders:  eval & treat, home exercise program and Full Motion, Full Strength, Aggressive; 2x/week x6 weeks. (10/11/22)   Date of Onset:  Onset Date: 22 (surgery; 22 injury)  Allergies: Duloxetine and Erythromycin  Restrictions/Precautions: Post op restrictions and precautions R shoulder. Interventions Planned (Treatment may consist of any combination of the following):    Current Treatment Recommendations: Strengthening; ROM; Manual Therapy - Soft Tissue Mobilization; Manual Therapy - Joint Manipulation; Pain management; Home exercise program; Modalities     Subjective Comments: Says she has found that she has an area of decreased sensation to the R cervicoscapular area. R shoulder. Says she is stiff and sore today.  Has been having RSD flare up recently. Initial:  No VAS    Post Session:       No VAS   Medications Last Reviewed: Naproxen; gabapen 12/5/2022  Updated Objective Findings:    ROM:   Not measured. Strength:   Not measured. Treatment   Active warm up on UBE  THERAPEUTIC EXERCISE: (30  minutes): Exercises for R shoulder active motion with Muscle Balance moves for upper quarter, including standing with back against wall bilateral ER in shoulder neutral, ER and IR in abduction close to 90 deg as possible, facing wall bilat ER at 90 deg flexion, facing wall bilat forearm wall slide, back to wall unilat shoulder flexion--3\"x10 each with emphasis on stable spine/scapula and manual end range over-pressure to each. Exercises for shoulder strength as per grid. Exercises modified as indicated. Verbal and visual cues for corrected movement patterns. Good return demonstration.     Date  11/3/22 Date  11/7/22 Date  11/10/22 Date  11/14/22 Date  11/17/22 Date  11/28/22 Date  12/1/22 Date  12/5/22   Activity/Exercise           UBE L. 3 x10' Level 3 x 5 min Level 3 x 5 min 5 min On own On own 10 minutes On own 5 min level 3 On own   ROM/Flexibility As above  See above See above Wall slides x15, 4 way As above See above at wall See above Active muscle balance as above   Rhythmic Stabilization: Red Flex bar oscil   Shoulder neutral, ER by side, 90-deg scaption  2x30\" ea   Red flex bar oscillation at 90 degrees of flexion 30 sec hold x 2    Full flexion 30 sec  Standing bilat ER with yellow band oscillation 3x30,  Bilat scaption yellow band oscil 2x5   -   Strength:   Shoulder ext Standing bilat  7# cable  3x15  Standing 7# 3x10 cable Standing 7# 3x10 cable - Prone 2# 2x10 with tactile cues Prone on bench 3# 2x10 B UE -   Horiz abd/Middle Trap Prone bilat  1# 2x10  Prone B 1# 2x10 Prone B UE 2# 2x10 - 1# 2x10 with tactile cues, prone Prone on bench 2x10 B UE 2# Standing unilat  Horiz abd   3# cable 2x15    Scaption/Lower Trap Prone bilat  1# 2x10  Prone B 1# 2x10 Prone 2x10 B 1# - Prone 1# 2x10 with tactile cues Prone on bench B UE 1# 2x10 -   ER/IR-- 90 deg -    -   -   Serratus Press Bench press  3# 2x15  Bench press 5# 2x10 Bench press 3x10, 5# -  Bench press 3x10, 5# Standing unilat horiz add  3# cable 2x15   Row Bent over   5# 2x15 ea  Prone on bench 5# 3x10    Standing at cable 2x10, 10# Cable 10# R UE 3x10 - Prone 3# 2x10 with tactile cues Prone 5# 3x10 B UE -   ER -- plane of scapula 3# 2x15 ea    -   2# 2x15 ea   Overhead press Standing  Landmine press with 3# bar  2x10   Standing landmine press with bar 3x10 -   -   Pull down Sitting bilat  10# cable  2x15    -   -   Forward Elevation unilat scaption   To 90-deg   1# 2x10 ea    -   _   Diagonal Ext        D1 and D2   3# cable 2x15 ea   Diagonal Flex        D1 and D2   2# dumbbell 2x15 ea   CKC UE -    Quadruped:  Respiratory belly lift with   B UE 1x 5 breath,  L UE only 1x5 breath,  R UE only 1x5 breath;    Quadruped to downward dog  2x5   -     HEP: She is to work on shoulder motion and stretching daily, strength again in 2-3 days. She is to continue to increase use of her R UE with daily activities for gross motor and fine motor tasks. She verbalizes understanding. [employer] Portal  7/5/22: [id number]  10/13/22: [id number] Initial scapulo-cuff strrength    Treatment/Session Summary:    Treatment Assessment: Very good effort with the exercises today. R shoulder AROM is improving. Resisted motion is progressing. Complaints of decreased sensation noted to the R lower cervical and upper trap region. Will need to look into this more. Communication/Consultation:  None today  Equipment provided today:  None  Recommendations/Intent for next treatment session: We will continue to progress R shoulder motion and re-strengthening as pain allows. Will do a re-check next time for MD follow up prep.     Total Treatment Billable Duration:  30 minutes   Time In: 1400  Time Out: 500 Catherine Pete, PT       Charge Capture Post Session Pain  PT Visit Info  [employer] Portal  MD Guidelines  Scanned Media  Benefits  MyChart    Future Appointments   Date Time Provider Nader Gisell   12/8/2022  1:45 PM Domenica Gentle Rey[place]ide, PT [facility]   12/13/2022  1:45 PM Bill Sarmiento, PT [facility]   12/13/2022  2:45 PM Dedrick Osgood., MD [facility]   1/4/2023  1:00 PM [facility]

## 2022-12-08 ENCOUNTER — HOSPITAL ENCOUNTER (OUTPATIENT)
Dept: PHYSICAL THERAPY | Age: 37
Setting detail: RECURRING SERIES
Discharge: HOME | End: 2022-12-11

## 2022-12-08 PROCEDURE — 97110 THERAPEUTIC EXERCISES: CPT

## 2022-12-08 NOTE — PROGRESS NOTES
Kimmy Santillan  : 1985  Primary: Robert Curry Sc  Secondary:  [address] @ [address] [address]  Phone: [phone]  Fax: [phone] Plan Frequency: 2x/week for an additional 12 weeks    Plan of Care/Certification Expiration Date: 22      PT Visit Info: Total # of Visits to Date: 39  Progress Note Counter: 10      OUTPATIENT PHYSICAL THERAPY:OP NOTE TYPE: Progress Report 2022               Episode  Appt Desk         Treatment Diagnosis:  Pain in Right Shoulder (M25.511)  Stiffness of Right Shoulder, Not elsewhere classified (M25.611)   Strain of muscle(s) and tendon(s) of the rotator cuff of right shoulder, sequela (S46.011S)  Medical/Referring Diagnosis: s/p  Arthroscopy of right shoulder, arthroscopic subacromial decompression, arthroscopic distal clavicle resection, extensive debridement of SLAP tear, biceps labral complex, glenohumeral joint, subacromial space, mini-open rotator cuff repair and biceps tenodesis. Strain of muscle(s) and tendon(s) of the rotator cuff of right shoulder, initial encounter [S46.011A]  Referring Physician:  Deepti Ugalde MD MD Orders: eval & treat, home exercise program and Full Motion, Full Strength, Aggressive; 2x/week x6 weeks. (10/11/22)   Return MD Appt:    Date of Onset:  Onset Date: 22 (surgery; 22 injury)      OBJECTIVE   Observation/Orthostatic Postural Assessment: Walks into the clinic with R UE in sling, no significant distress.    ROM:  PROM RUE (degrees)  R Shoulder Flex  (0-180): 170 (forward elevation)  R Shoulder ABduction (0-180): 100 (with scapula stabilized)  R Shoulder Int Rotation  (0-70): 75 (in shoulder neutral, 85 at 45 and 90 deg abd)  R Shoulder Ext Rotation  (0-90): 60 (stabilized at 45 and 90 deg abd)  AROM RUE (degrees)  R Shoulder Flexion (0-180): 170 (forward elevation)  R Shoulder Int Rotation  (0-70): 50 (at 90 deg abd; to T9 behind back)  R Shoulder Ext Rotation (0-90): 75 (by side, 80 at 90 deg abd.)\  Strength:       R Shoulder: Strong and painless to all with Soft Tissue Differential testing; 4 to ER, flex, abd, 5 to remainder with manual testing. Neuro Screen: Decreased light touch sensation and sharp/dull discrimination to R C3, C4, C5 dermatomes. Special Test: Empty can: mild weak with pain on R.  ASSESSMENT   Progress Assessment: Ms. Katiana Márquez is now 6 months s/p arthroscopy of right shoulder with arthroscopic subacromial decompression, arthroscopic distal clavicle resection, extensive debridement of a type 2 SLAP tear, biceps labral complex, glenohumeral joint, subacromial space, mini-open rotator cuff repair for a 3 cm full-thickness subscapularis and infraspinatus rotator cuff tear, and biceps tenodesis on 6/17/22. She continues to make steady progress. She continues to have pain to the shoulder, and has apprehension with active use of the R UE. She is fearful of developing and RDS to the R shoulder as well. She has very good shoulder ROM. There is weakness to the shoulder and rotator cuff. This is improving. She has been encouraged to increase use of the R UE with daily activities. She has a recent complaint of decreased sensation to the R mid cervical spine, C3-5 dermatomes. We will plan to continue with her current treatment plan to further progress her R/dominant UE. Problem List: (Impacting functional limitations): Body Structures, Functions, Activity Limitations Requiring Skilled Therapeutic Intervention: Decreased ROM; Decreased tolerance to work activity; Increased pain     Therapy Prognosis:   Therapy Prognosis: Good     PLAN   Effective Dates: 9/29/22 TO Plan of Care/Certification Expiration Date: 12/28/22    Frequency/Duration: Plan Frequency: 2x/week for an additional 12 weeks    Interventions Planned (Treatment may consist of any combination of the following):    Current Treatment Recommendations: ROM;  Manual Therapy - Soft Tissue Mobilization; Manual Therapy - Joint Manipulation; Pain management; Home exercise program; Modalities; progressing to Strengthening when appropriate. Goals: (Goals have been discussed and agreed upon with patient.)  Short-Term Functional Goals: Time Frame: 4-6 weeks  Report no more than 5/10 intermittent pain to R shoulder with compensatory use during basic functional activities, and score less than 50% on the DASH. Met 9/29/22  R shoulder PROM forward elevation greater than 135 degrees and external rotation greater than 60 degrees to progress into functional ranges. Met 9/2/22  Demonstrate good R shoulder isometric strength with manual testing to progress into strength phase. Met 8/1/22  Independent with initial HEP. Met 8/1/22  Discharge Goals: Time Frame:12 weeks  No more than 3/10 intermittent pain R shoulder with return to normalized household and work-related activities, and score less than 30% on the DASH. ongoing 12/8/22  R shoulder AROM forward elevation greater than 135 degrees, external rotation greater than 60 degrees, and strength to shoulder are grossly WNL's for safe use with normalized activities. Met 10/24/22  Demonstrate good functional shoulder strength and endurance for return to normalized household and work activities. Progressed, ongoing 12/8//22   Independent with advanced shoulder HEP for continued self-management. Progressed, ongoing 12/8/22       Outcome Measure: Tool Used: Disabilities of the Arm, Shoulder and Hand (DASH) Questionnaire - Quick Version  Score  Initial: 44/55 or 75% disability 29/55 or 41% disability (9/29/22) Most Recent: 37/55 or 59% disability    Interpretation of Score: The DASH is designed to measure the activities of daily living in person's with upper extremity dysfunction or pain. Each section is scored on a 1-5 scale, 5 representing the greatest disability. The scores of each section are added together for a total score of 55.       Medical Necessity:   She is nearly 6 months post op R shoulder. Impairments listed above are limiting comfort and basic functional use her R/dominant UE. She is progressing. Reason For Services/Other Comments:  Patient continues to require skilled intervention due to the complexity fo the surgical procedure and need for safe, progressive rehab to return to functional use of her R/dominant UE. Co-morbid cervical pain, strain and L shoulder pain and cuff pathology and history of CRPS to B LE will mostly likely make progress slower.      Leandra Oneil, PT , MSPT, OCS           Post Session Pain  Charge Capture  PT Visit Info  POC Link  Treatment Note Link  MD Guidelines  Gail

## 2022-12-08 NOTE — PROGRESS NOTES
Lakhwinder Hitesh  : 1985  Primary: [address]  Secondary:  [address] @ [address] [address]  Phone: [phone]  Fax: [phone] Plan Frequency: 2x/week for an additional 12 weeks    Plan of Care/Certification Expiration Date: 22      PT Visit Info: Total # of Visits to Date: 39  Progress Note Counter: 10     Visit Count:  39   OUTPATIENT PHYSICAL THERAPY:OP NOTE TYPE: Treatment Note 2022       Episode  Appt Desk             Treatment Diagnosis:  Pain in Right Shoulder (M25.511)  Stiffness of Right Shoulder, Not elsewhere classified (M25.611)   Strain of muscle(s) and tendon(s) of the rotator cuff of right shoulder, sequela (S46.011S)  Medical/Referring Diagnosis: s/p  Arthroscopy of right shoulder, arthroscopic subacromial decompression, arthroscopic distal clavicle resection, extensive debridement of SLAP tear, biceps labral complex, glenohumeral joint, subacromial space, mini-open rotator cuff repair and biceps tenodesis. Strain of muscle(s) and tendon(s) of the rotator cuff of right shoulder, initial encounter [S46.011A]  Referring Physician:  Neris Alvarez MD MD Orders:  eval & treat, home exercise program and Full Motion, Full Strength, Aggressive; 2x/week x6 weeks. (10/11/22)   Date of Onset:  Onset Date: 22 (surgery; 22 injury)  Allergies: Duloxetine and Erythromycin  Restrictions/Precautions: Post op restrictions and precautions R shoulder. Interventions Planned (Treatment may consist of any combination of the following):    Current Treatment Recommendations: Strengthening; ROM; Manual Therapy - Soft Tissue Mobilization; Manual Therapy - Joint Manipulation; Pain management; Home exercise program; Modalities     Subjective Comments: Says she continues to have pain to the R shoulder. L shoulder has pain as well. Also continues to have decreased sensation to the R lower neck and scapula.  Syas she continues to be fearful of the pain to the R shoulder so not using the arm as much. Has history of RSD, and afraid it will develop in the R shoulder as well. Initial:      Post Session:          Medications Last Reviewed: Naproxen; gabapen 12/8/2022  Updated Objective Findings:    ROM:  PROM RUE (degrees)  R Shoulder Flex  (0-180): 170 (forward elevation)  R Shoulder ABduction (0-180): 100 (with scapula stabilized)  R Shoulder Int Rotation  (0-70): 75 (in shoulder neutral, 85 at 45 and 90 deg abd)  R Shoulder Ext Rotation  (0-90): 60 (stabilized at 45 and 90 deg abd)  AROM RUE (degrees)  R Shoulder Flexion (0-180): 170 (forward elevation)  R Shoulder Int Rotation  (0-70): 50 (at 90 deg abd; to T9 behind back)  R Shoulder Ext Rotation (0-90): 75 (by side, 80 at 90 deg abd.)  Strength:   R Shoulder: Strong and painless to all with Soft Tissue Differential testing; 4 to ER, flex, abd, 5 to remainder with manual testing. Neuro Screen: Decreased light touch sensation and sharp/dull discrimination to R C3, C4, C5 dermatomes. Special Test: Empty can: mild weak with pain on R. DASH:  37/55 or 59% disability   Treatment   Active warm up on UBE  THERAPEUTIC EXERCISE: (40 minutes): Exercises for R shoulder motion in supine with Assisted Active Isolated Stretches to R shoulder to ER at 30, 45, and 90 degrees abduction, IR stabilized at 45 and 90 degrees abduction, ER and IR at 90 degrees flexion, abduction, forward elevation, horizontal abduction/adduction, 3\"x10 each; in standing to ER at 30 and 90 degrees abduction, IR at 90 degrees abduction, forward elevation, and extension, extension/IR, and extension/IR/adduction behind the back--3\"x10 each. Exercises for spine re-setting with sitting cervical extension with over-pressure x10; prone press up x10; quadruped respiratory belly lift 2x 5 breath holds; and quadruped to rock back x10.     Date  11/3/22 Date  11/7/22 Date  11/10/22 Date  11/14/22 Date  11/17/22 Date  11/28/22 Date  12/1/22 Date  12/5/22 Date  12/8/22   Activity/Exercise            UBE L. 3 x10' Level 3 x 5 min Level 3 x 5 min 5 min On own On own 10 minutes On own 5 min level 3 On own On own   ROM/Flexibility As above  See above See above Wall slides x15, 4 way As above See above at wall See above Active muscle balance as above As above   Rhythmic Stabilization: Red Flex bar oscil   Shoulder neutral, ER by side, 90-deg scaption  2x30\" ea   Red flex bar oscillation at 90 degrees of flexion 30 sec hold x 2    Full flexion 30 sec  Standing bilat ER with yellow band oscillation 3x30,  Bilat scaption yellow band oscil 2x5   - -   Strength:   Shoulder ext Standing bilat  7# cable  3x15  Standing 7# 3x10 cable Standing 7# 3x10 cable - Prone 2# 2x10 with tactile cues Prone on bench 3# 2x10 B UE - =   Horiz abd/Middle Trap Prone bilat  1# 2x10  Prone B 1# 2x10 Prone B UE 2# 2x10 - 1# 2x10 with tactile cues, prone Prone on bench 2x10 B UE 2# Standing unilat  Horiz abd   3# cable 2x15  -   Scaption/Lower Trap Prone bilat  1# 2x10  Prone B 1# 2x10 Prone 2x10 B 1# - Prone 1# 2x10 with tactile cues Prone on bench B UE 1# 2x10 - -   ER/IR-- 90 deg -    -   - -   Serratus Press Bench press  3# 2x15  Bench press 5# 2x10 Bench press 3x10, 5# -  Bench press 3x10, 5# Standing unilat horiz add  3# cable 2x15 -   Row Bent over   5# 2x15 ea  Prone on bench 5# 3x10    Standing at cable 2x10, 10# Cable 10# R UE 3x10 - Prone 3# 2x10 with tactile cues Prone 5# 3x10 B UE - -   ER -- plane of scapula 3# 2x15 ea    -   2# 2x15 ea -   Overhead press Standing  Landmine press with 3# bar  2x10   Standing landmine press with bar 3x10 -   - -   Pull down Sitting bilat  10# cable  2x15    -   - -   Forward Elevation unilat scaption   To 90-deg   1# 2x10 ea    -   _ -   Diagonal Ext        D1 and D2   3# cable 2x15 ea -   Diagonal Flex        D1 and D2   2# dumbbell 2x15 ea -   CKC UE -    Quadruped:  Respiratory belly lift with   B UE 1x 5 breath,  L UE

## 2022-12-13 ENCOUNTER — OFFICE VISIT (OUTPATIENT)
Dept: ORTHOPEDIC SURGERY | Age: 37
End: 2022-12-13

## 2022-12-13 ENCOUNTER — HOSPITAL ENCOUNTER (OUTPATIENT)
Dept: PHYSICAL THERAPY | Age: 37
Setting detail: RECURRING SERIES
Discharge: HOME | End: 2022-12-16

## 2022-12-13 DIAGNOSIS — Z47.89 ORTHOPEDIC AFTERCARE: Primary | ICD-10-CM

## 2022-12-13 PROCEDURE — 97110 THERAPEUTIC EXERCISES: CPT

## 2022-12-13 PROCEDURE — 99212 OFFICE O/P EST SF 10 MIN: CPT | Performed by: ORTHOPAEDIC SURGERY

## 2022-12-13 NOTE — PROGRESS NOTES
Christine Azra  : 1985  Primary: Aurel[address] Star[address] Sc  Secondary:  [address] [address]  Phone: [phone]  Fax: [phone] Plan Frequency: 2x/week for an additional 12 weeks    Plan of Care/Certification Expiration Date: 22      PT Visit Info: Total # of Visits to Date: 55  Progress Note Counter: 1     Visit Count:  55   OUTPATIENT PHYSICAL THERAPY:OP NOTE TYPE: Treatment Note 2022       Episode  Appt Desk             Treatment Diagnosis:  Pain in Right Shoulder (M25.511)  Stiffness of Right Shoulder, Not elsewhere classified (M25.611)   Strain of muscle(s) and tendon(s) of the rotator cuff of right shoulder, sequela (S46.011S)  Medical/Referring Diagnosis: s/p  Arthroscopy of right shoulder, arthroscopic subacromial decompression, arthroscopic distal clavicle resection, extensive debridement of SLAP tear, biceps labral complex, glenohumeral joint, subacromial space, mini-open rotator cuff repair and biceps tenodesis. Strain of muscle(s) and tendon(s) of the rotator cuff of right shoulder, initial encounter [S46.011A]  Referring Physician:  Alex Elmore MD MD Orders:  eval & treat, home exercise program and Full Motion, Full Strength, Aggressive; 2x/week x6 weeks. (10/11/22)   Date of Onset:  Onset Date: 22 (surgery; 22 injury)  Allergies: Duloxetine and Erythromycin  Restrictions/Precautions: Post op restrictions and precautions R shoulder. Interventions Planned (Treatment may consist of any combination of the following):    Current Treatment Recommendations: Strengthening; ROM; Manual Therapy - Soft Tissue Mobilization; Manual Therapy - Joint Manipulation; Pain management; Home exercise program; Modalities     Subjective Comments: Pt reports she feels like she is losing some sensation around her R shoulder.   Initial:      Post Session:          Medications Last Reviewed: Naproxen; gabapen 2022  Updated Objective Findings:      Treatment   Active warm up on UBE  THERAPEUTIC EXERCISE: (40 minutes): Exercises for R shoulder motion with Assisted Active Isolated Stretches in standing to ER at 30 and 90 degrees abduction, IR at 90 degrees abduction, forward elevation, and extension, extension/IR, and extension/IR/adduction behind the back--3\"x10 each.        Date  11/14/22 Date  11/17/22 Date  11/28/22 Date  12/1/22 Date  12/5/22 Date  12/8/22 Date  12-13-22   Activity/Exercise          UBE 5 min On own On own 10 minutes On own 5 min level 3 On own On own On own   ROM/Flexibility Wall slides x15, 4 way As above See above at wall See above Active muscle balance as above As above As above   Rhythmic Stabilization: Red flex bar oscillation at 90 degrees of flexion 30 sec hold x 2    Full flexion 30 sec  Standing bilat ER with yellow band oscillation 3x30,  Bilat scaption yellow band oscil 2x5   - - Red flex bar at 30 deg flexion 30\", 90 deg flexion 30\" and full FE 30\"   Strength:   Shoulder ext Standing 7# 3x10 cable - Prone 2# 2x10 with tactile cues Prone on bench 3# 2x10 B UE - = Prone on bench 3# 2x15   Horiz abd/Middle Trap Prone B UE 2# 2x10 - 1# 2x10 with tactile cues, prone Prone on bench 2x10 B UE 2# Standing unilat  Horiz abd   3# cable 2x15  - Prone on bench 2# 2x15   Scaption/Lower Trap Prone 2x10 B 1# - Prone 1# 2x10 with tactile cues Prone on bench B UE 1# 2x10 - - Prone on bench 1# 2x15   ER/IR-- 90 deg  -   - -    Serratus Press Bench press 3x10, 5# -  Bench press 3x10, 5# Standing unilat horiz add  3# cable 2x15 - Bench press bilateral UE 5# 2x15   Row Cable 10# R UE 3x10 - Prone 3# 2x10 with tactile cues Prone 5# 3x10 B UE - -    ER -- plane of scapula  -   2# 2x15 ea - 2# 2x15 ea   Overhead press Standing landmine press with bar 3x10 -   - -    Pull down  -   - -    Forward Elevation  -   _ -    Diagonal Ext     D1 and D2   3# cable 2x15 ea - 3# cable D1 and D2 15 ea   Diagonal Flex     D1 and D2   2# dumbbell 2x15 ea - Lifts on cables 7# 10 ea way   CKC UE  Quadruped:  Respiratory belly lift with   B UE 1x 5 breath,  L UE only 1x5 breath,  R UE only 1x5 breath;    Quadruped to downward dog  2x5   - As above      HEP: She is to continue with her HEP. She was advised to continue to increase use of her R UE with daily activities for gross motor and fine motor tasks. She verbalizes understanding. [employer]  7/5/22: [id number]  10/13/22: [id number] Initial scapulo-cuff strrength    Treatment/Session Summary:    Treatment Assessment: She fatigues with overhead strengthening exercises. Communication/Consultation:  None today  Equipment provided today:  None  Recommendations/Intent for next treatment session: We will continue to progress R shoulder motion and re-strengthening as pain allows.     Total Treatment Billable Duration:  40 minutes   Time In: 1400  Time Out: 7376    Erica De Leon PT       Charge Capture Post Session Pain  PT Visit Info  [facility] Portal  MD Guidelines  Scanned Media  Benefits  MyChart    Future Appointments   Date Time Provider Nader Jameson   1/4/2023  1:00 PM [facility]

## 2022-12-13 NOTE — PROGRESS NOTES
Name: Princess Camacho  YOB: 1985  Gender: female  MRN: [id number]          HPI: Princess Camacho is a 40 y.o. right-hand-dominant female 6 months status post arthroscopy right shoulder ASD, ADCR, extensive debridement SLAP tear, biceps labral complex, glenohumeral joint, subacromial space, mini open rotator cuff repair and biceps tenodesis. Operative findings were notable for a 3 cm rotator cuff tear. She returns noting she is doing better. She is complaining of some decreased sensation on the posterior right scapula. She is also weak. ROS/Meds/PSH/PMH/FH/SH: A ten system review of systems was performed and is negative other than what is in the HPI. Tobacco:  reports that she quit smoking about 14 months ago. Her smoking use included cigarettes. She has a 10.00 pack-year smoking history. She has never used smokeless tobacco.  There were no vitals taken for this visit. Physical Examination:  She is an awake alert pleasant female ambulating without difficulty  She has restricted range of cervical spine motion with bilateral trapezial tenderness right greater than left    The left shoulder has 0 to 180 degrees of active and 0 to 180 degrees passive forward elevation. Pain at extremes of motion and in the overhead position  Internal rotation is to T6. External rotation is to 60 degrees at the side. In the 90 degree abducted position 90 degrees of external and 90 degrees internal rotation  The AC joint is tender  SC joint is non-tender. Greater tuberosity is non-tender. negative biceps  Negative O'Briens sign  negative lift-off sign  Negative belly press sign  Negative bear huggers sign  negative drop sign  negative hornblower's sign  No posterior glenohumeral joint line tenderness.    No evident excessive external rotation  Rotator cuff strength is 5/5.  negative external rotation stress test.   Negative empty can sign  There is no evident anterior or posterior apprehension with a negative sulcus sign. No instability  negative external and internal Rotation lag sign  Neurovascularly intact. The right shoulder has well-healed incisions  She has pain and weakness at end range of motion  The right shoulder has 0 to 180 degrees of active and 0 to 180 degrees passive forward elevation. Internal rotation is to T6. External rotation is to 60 degrees at the side. In the 90 degree abducted position 90 degrees of external and 80 degrees internal rotation  The AC joint is non-tender  SC joint is non-tender. Greater tuberosity is non-tender. negative biceps  Negative O'Briens sign  negative lift-off sign  Negative belly press sign  Negative bear huggers sign  negative drop sign  negative hornblower's sign  No posterior glenohumeral joint line tenderness. No evident excessive external rotation  Rotator cuff strength is 5/5.  negative external rotation stress test.   Negative empty can sign  There is no evident anterior or posterior apprehension with a negative sulcus sign. No instability  negative external and internal Rotation lag sign  Neurovascularly intact. Data Reviewed:      Impression:   1. Orthopedic aftercare       status post arthroscopy right shoulder ASD, ADCR, extensive debridement SLAP tear, biceps labral complex, glenohumeral joint, subacromial space, mini open rotator cuff repair and biceps tenodesis 6 months  Left shoulder pain  History of right-sided DVT  History of seizure disorder   history of RSD  Throat pain  Back pain  Neck spasm      Plan:   I discussed the plan with the patient. She continues to improve. We will continue physical therapy for 6 weeks working on full strength and full motion. She can return to work. Work restrictions will include no lifting more than 30 pounds overhead with the right arm. These restrictions will stand for 2 months. I will recheck her back in 2 months    2. Self-limited problem    Follow up: No follow-ups on file. Lory Prescott MD

## 2023-01-04 ENCOUNTER — HOSPITAL ENCOUNTER (OUTPATIENT)
Dept: PHYSICAL THERAPY | Age: 38
Setting detail: RECURRING SERIES
Discharge: HOME | End: 2023-01-07
Payer: COMMERCIAL

## 2023-01-04 PROCEDURE — 97110 THERAPEUTIC EXERCISES: CPT

## 2023-01-04 NOTE — THERAPY RECERTIFICATION
Violadiana Scruggs  : 1985  Primary: Kristine Marcelino Sc  Secondary:  [facility]  [address] [facility] [place] [address]  Phone: [phone]  Fax: [phone] Plan Frequency: 2x/week for an additional 12 weeks    Plan of Care/Certification Expiration Date: 23      PT Visit Info: Total # of Visits to Date: 52      OUTPATIENT PHYSICAL THERAPY:OP NOTE TYPE: Recertification [id number]               Episode  Appt Desk         Treatment Diagnosis:  Pain in Right Shoulder (M25.511)  Stiffness of Right Shoulder, Not elsewhere classified (M25.611)   Strain of muscle(s) and tendon(s) of the rotator cuff of right shoulder, sequela (S46.011S)  Medical/Referring Diagnosis: s/p  Arthroscopy of right shoulder, arthroscopic subacromial decompression, arthroscopic distal clavicle resection, extensive debridement of SLAP tear, biceps labral complex, glenohumeral joint, subacromial space, mini-open rotator cuff repair and biceps tenodesis. Strain of muscle(s) and tendon(s) of the rotator cuff of right shoulder, initial encounter [S46.011A]  Referring Physician:  Dali Neri MD MD Orders: eval & treat, home exercise program and Full Motion, Full Strength; 2x/week x6 weeks.  (22)    Return MD Appt:  23  Date of Onset:  Onset Date: 22 (surgery; 22 injury)      OBJECTIVE   ROM:  PROM RUE (degrees)  R Shoulder Flex  (0-180): 170 (forward elevation)  R Shoulder ABduction (0-180): 100 (with scapula stabilized)  R Shoulder Int Rotation  (0-70): 75 (stabilized at 45 abd, 70 at 90 abd)  R Shoulder Ext Rotation  (0-90): 80 (in shoulder neutral, 95 at 45 abd, 95 at 90 abd)  AROM RUE (degrees)  R Shoulder Flexion (0-180): 170 (forward elevation with pain arc)  R Shoulder Int Rotation  (0-70): 60 (at 90 deg abd; to T8 behind back.)  R Shoulder Ext Rotation (0-90): 80 (by side, 90 deg at 90 deg abd)  Strength:       R Shoulder: Strong and painless to all with Soft Tissue Differential testing; 4 to ER, flex, abd, 5 to remainder with manual testing. Neuro Screen: Decreased light touch sensation and sharp/dull discrimination to R C3, C4, C5 dermatomes. Special Test: Empty can: mild weak with pain on R.  ASSESSMENT   Re-Certification Assessment: Ms. Maggie Vázquez is 6 months s/p arthroscopy of right shoulder with arthroscopic subacromial decompression, arthroscopic distal clavicle resection, extensive debridement of a type 2 SLAP tear, biceps labral complex, glenohumeral joint, subacromial space, mini-open rotator cuff repair for a 3 cm full-thickness subscapularis and infraspinatus rotator cuff tear, and biceps tenodesis on 6/17/22. She continues to make steady progress. She continues to have pain to the shoulder, and has apprehension with active use of the R UE. She is fearful of developing and RDS to the R shoulder as well. She has very good shoulder ROM. There is weakness to the shoulder and rotator cuff. This is improving. She is increasing use of the R UE with daily activities, but still is limited. She also has complaint of decreased sensation to the R mid cervical spine, C3-5 dermatomes. She is slowly making progress toward her discharge goals. These impairments continue to limit functional use of her R/dominant UE. She will benefit from continued PT to further progress post operative re-strengthening to improve comfort and use of the UE. Problem List: (Impacting functional limitations): Body Structures, Functions, Activity Limitations Requiring Skilled Therapeutic Intervention: Decreased ROM; Decreased tolerance to work activity; Increased pain      Therapy Prognosis:   Therapy Prognosis: Good       PLAN   Effective Dates: 1/4/23 TO Plan of Care/Certification Expiration Date: 02/18/23     Frequency/Duration: Plan Frequency: 2x/week for an additional 6 weeks    Interventions Planned (Treatment may consist of any combination of the following):    Current Treatment Recommendations: ROM;  Manual Therapy - Soft Tissue Mobilization; Manual Therapy - Joint Manipulation; Pain management; Home exercise program; Modalities; progressing to Strengthening when appropriate. Goals: (Goals have been discussed and agreed upon with patient.)  Short-Term Functional Goals:   Report no more than 5/10 intermittent pain to R shoulder with compensatory use during basic functional activities, and score less than 50% on the DASH. Met 9/29/22  R shoulder PROM forward elevation greater than 135 degrees and external rotation greater than 60 degrees to progress into functional ranges. Met 9/2/22  Demonstrate good R shoulder isometric strength with manual testing to progress into strength phase. Met 8/1/22  Independent with initial HEP. Met 8/1/22  Discharge Goals: Time Frame:6 additional weeks  No more than 3/10 intermittent pain R shoulder with return to normalized household and work-related activities, and score less than 30% on the DASH. ongoing 1/4/23  R shoulder AROM forward elevation greater than 135 degrees, external rotation greater than 60 degrees, and strength to shoulder are grossly WNL's for safe use with normalized activities. Met 10/24/22  Demonstrate good functional shoulder strength and endurance for return to normalized household and work activities. Progressed, ongoing 1/4/23   Independent with advanced shoulder HEP for continued self-management. Progressed, ongoing 1/4/23       Outcome Measure: Tool Used: Disabilities of the Arm, Shoulder and Hand (DASH) Questionnaire - Quick Version  Score  Initial: 44/55 or 75% disability 29/55 or 41% disability (9/29/22) Most Recent: 37/55 or 59% disability (1/4/23)   Interpretation of Score: The DASH is designed to measure the activities of daily living in person's with upper extremity dysfunction or pain. Each section is scored on a 1-5 scale, 5 representing the greatest disability. The scores of each section are added together for a total score of 55.       Medical Necessity: She is 6 months post op R shoulder. Impairments listed above are limiting comfort and basic functional use her R/dominant UE. She is progressing. Reason For Services/Other Comments:  Patient continues to require skilled intervention due to the complexity fo the surgical procedure and need for safe, progressive rehab to return to functional use of her R/dominant UE. Co-morbid cervical pain, strain and L shoulder pain and cuff pathology and history of CRPS to B LE will mostly likely make progress slower. Regarding [facility], I certify that the treatment plan above will be carried out by a therapist or under their direction.   Thank you for this referral,    Kurt Doshi, PT, MSPT, OCS       Referring Physician Signature: Vibha Quan MD                    Post Session Pain  Charge Capture  PT Visit Info  POC Link  Treatment Note Link  MD Guidelines  Gail

## 2023-01-04 NOTE — PROGRESS NOTES
Oneil Cárdenas  : 1985  Primary: Charm Goldmann Sc  Secondary:  [employer]  [address]  [facility] [place] [address]  Phone: [phone]  Fax: [phone] Plan Frequency: 2x/week for an additional 12 weeks    Plan of Care/Certification Expiration Date: 23      PT Visit Info: Total # of Visits to Date: 52  Progress Note Counter: 2     Visit Count:  52   OUTPATIENT PHYSICAL THERAPY:OP NOTE TYPE: Treatment Note 2023       Episode  Appt Desk             Oneil Cárdenas transfers to [address] [place] [address] from [address] [place] [address] for continuation of her current treatment plan. Treatment Diagnosis:  Pain in Right Shoulder (M25.511)  Stiffness of Right Shoulder, Not elsewhere classified (M25.611)   Strain of muscle(s) and tendon(s) of the rotator cuff of right shoulder, sequela (S46.011S)  Medical/Referring Diagnosis: s/p  Arthroscopy of right shoulder, arthroscopic subacromial decompression, arthroscopic distal clavicle resection, extensive debridement of SLAP tear, biceps labral complex, glenohumeral joint, subacromial space, mini-open rotator cuff repair and biceps tenodesis. Strain of muscle(s) and tendon(s) of the rotator cuff of right shoulder, initial encounter [S46.011A]  Referring Physician:  Vasu Gomez MD MD Orders:  eval & treat, home exercise program and Full Motion, Full Strength; 2x/week x6 weeks. (22)   Return MD Appt:  23  Date of Onset:  Onset Date: 22 (surgery; 22 injury)  Allergies: Duloxetine and Erythromycin  Restrictions/Precautions: Post op restrictions and precautions R shoulder.    Interventions Planned (Treatment may consist of any combination of the following):    Current Treatment Recommendations: Strengthening; ROM; Manual Therapy - Soft Tissue Mobilization; Manual Therapy - Joint Manipulation; Pain management; Home exercise program; Modalities     Subjective Comments: Says she has been working on her HEP as best she can at home. Says she slept on the R shoulder wrong one night which made it so he she could not raise her arm. This improved. Doing ok today. Has been doing more activity using her arm, but feels limited still. Initial:      Post Session:          Medications Last Reviewed: Naproxen; gabapen 1/4/2023  Updated Objective Findings:    ROM:  PROM RUE (degrees)  R Shoulder Flex  (0-180): 170 (forward elevation)  R Shoulder ABduction (0-180): 100 (with scapula stabilized)  R Shoulder Int Rotation  (0-70): 75 (stabilized at 45 abd, 70 at 90 abd)  R Shoulder Ext Rotation  (0-90): 80 (in shoulder neutral, 95 at 45 abd, 95 at 90 abd)  AROM RUE (degrees)  R Shoulder Flexion (0-180): 170 (forward elevation with pain arc)  R Shoulder Int Rotation  (0-70): 60 (at 90 deg abd; to T8 behind back.)  R Shoulder Ext Rotation (0-90): 80 (by side, 90 deg at 90 deg abd)  Strength:   R Shoulder: strong and painless to all with Soft Tissue Differential testing; 4+ to 5- to shoulder flex, abd, ER, 5 to remainder. Special Tests: Empty Can test: pain, weakness on R. Treatment   THERAPEUTIC EXERCISE: (40 minutes): Exercises for R shoulder motion with Assisted Active Isolated Stretches in standing to ER at 30 and 90 degrees abduction, IR at 90 degrees abduction, forward elevation, and extension/IR/adduction behind the back--3\"x10 each; in supine, assisted Active Isolated Stretches to ER at 30, 45, and 90 degrees abduction, IR stabilized at 45 and 90 degrees abduction, ER and IR at 90 degrees flexion, abduction, forward elevation, horizontal abduction/adduction, 3\"x10 each. Exercises for shoulder girdle strength done as per grid. Exercises as indicated.     Date  11/14/22 Date  11/17/22 Date  11/28/22 Date  12/1/22 Date  12/5/22 Date  12/8/22 Date  12-13-22 Date  1/4/22   Activity/Exercise           UBE 5 min On own On own 10 minutes On own 5 min level 3 On own On own On own -   ROM/Flexibility Wall slides x15, 4 way As above See above at wall See above Active muscle balance as above As above As above Assisted as above   Rhythmic Stabilization: Red flex bar oscillation at 90 degrees of flexion 30 sec hold x 2    Full flexion 30 sec  Standing bilat ER with yellow band oscillation 3x30,  Bilat scaption yellow band oscil 2x5   - - Red flex bar at 30 deg flexion 30\", 90 deg flexion 30\" and full FE 30\" -   Strength:   Shoulder ext Standing 7# 3x10 cable - Prone 2# 2x10 with tactile cues Prone on bench 3# 2x10 B UE - = Prone on bench 3# 2x15 Prone bilat  2# 2x10   Horiz abd/Middle Trap Prone B UE 2# 2x10 - 1# 2x10 with tactile cues, prone Prone on bench 2x10 B UE 2# Standing unilat  Horiz abd   3# cable 2x15  - Prone on bench 2# 2x15 Prone bilat  2# 2x10   Scaption/Lower Trap Prone 2x10 B 1# - Prone 1# 2x10 with tactile cues Prone on bench B UE 1# 2x10 - - Prone on bench 1# 2x15 Prone bilat  1# 2x10   ER/IR-- 90 deg  -   - -  Prone unilat  2# 2x10 ea   Serratus Press Bench press 3x10, 5# -  Bench press 3x10, 5# Standing unilat horiz add  3# cable 2x15 - Bench press bilateral UE 5# 2x15 -   Row Cable 10# R UE 3x10 - Prone 3# 2x10 with tactile cues Prone 5# 3x10 B UE - -  -   ER -- plane of scapula  -   2# 2x15 ea - 2# 2x15 ea -   Overhead press Standing landmine press with bar 3x10 -   - -  -   Pull down  -   - -  -   Forward Elevation  -   _ -  Standing full arc with manual resisted serratus 2x10   Diagonal Ext     D1 and D2   3# cable 2x15 ea - 3# cable D1 and D2 15 ea -   Diagonal Flex     D1 and D2   2# dumbbell 2x15 ea - Lifts on cables 7# 10 ea way -   CKC UE  Quadruped:  Respiratory belly lift with   B UE 1x 5 breath,  L UE only 1x5 breath,  R UE only 1x5 breath;    Quadruped to downward dog  2x5   - As above  Quadruped:  Respiratory belly lift with   B UE 3x 5 breath;    Quadruped opposite lifts  2x5 ea       HEP: She is to continue with her HEP. She verbalizes understanding.     [employer] Portal  7/5/22: [id number]  10/13/22: [id number] Initial scapulo-cuff strsantosjacquelyn    Treatment/Session Summary:    Treatment Assessment: She is 6 months post op R shoulder. She continues to have pain to the shoulder. Good shoulder ROM. There is weakness with pain on resistance to the cuff with strength testing. She reports improving functonal use of the R UE. These impairments continue limiting use of the arm. Communication/Consultation:  None today  Equipment provided today:  None  Recommendations/Intent for next treatment session: We will continue to progress R shoulder motion and re-strengthening as pain allows.     Total Treatment Billable Duration:  40 minutes   Time In: 7381  Time Out: [address], PT       Charge Capture Post Session Pain  PT Visit Info  MedBridge Portal  MD Guidelines  Scanned Media  Benefits  InfoGinhart    Future Appointments   Date Time Provider Nader Jameson   1/9/2023  4:00 PM Jacklyn Ruth [place], PT [facility]   1/11/2023  3:15 PM Melissa Reels, PT [facility]   1/17/2023  4:00 PM Melissa Reels, PT [facility]   1/19/2023  2:30 PM Will Park, PT [facility]   1/24/2023  1:00 PM Will Park, PT [facility]   1/26/2023  1:00 PM Melissa Reels, PT [facility]   1/30/2023  1:00 PM Melissa Reels, PT [facility]   2/2/2023  1:00 PM Melissa Reels, PT [facility]   2/6/2023  1:00 PM Melissa Reels, PT [facility]   2/9/2023  1:00 PM Melissa Reels, PT [facility]   2/13/2023  1:00 PM Melissa Reels, PT [facility]   2/16/2023  1:00 PM Melissa Reels, PT [facility]   2/20/2023  1:00 PM Melissa Reels, PT [facility]   2/21/2023  2:30 PM Leron Mohs., MD [facility]   2/23/2023  1:00 PM Melissa Reels, PT [facility]   2/27/2023  1:00 PM Melissa Reels, PT [facility]   3/2/2023  1:00 PM [facility]

## 2023-01-09 ENCOUNTER — HOSPITAL ENCOUNTER (OUTPATIENT)
Dept: PHYSICAL THERAPY | Age: 38
Setting detail: RECURRING SERIES
Discharge: HOME | End: 2023-01-12
Payer: COMMERCIAL

## 2023-01-09 PROCEDURE — 97110 THERAPEUTIC EXERCISES: CPT

## 2023-01-09 NOTE — PROGRESS NOTES
Misbah Thomson  : 1985  Primary: Daniel Allison  Secondary:  [facility] [employer]  [address] Jason wayne [address] [place] [address]  Phone: [phone]  Fax: [phone] Plan Frequency: 2x/week for an additional 12 weeks    Plan of Care/Certification Expiration Date: 23      PT Visit Info: Total # of Visits to Date: 50  Progress Note Counter: 3     Visit Count:  50   OUTPATIENT PHYSICAL THERAPY:OP NOTE TYPE: Treatment Note 2023       Episode  Appt Desk             Misbah Thomson transfers to [address] [place] [address] from [address] [place] [address] for continuation of her current treatment plan. Treatment Diagnosis:  Pain in Right Shoulder (M25.511)  Stiffness of Right Shoulder, Not elsewhere classified (M25.611)   Strain of muscle(s) and tendon(s) of the rotator cuff of right shoulder, sequela (S46.011S)  Medical/Referring Diagnosis: s/p  Arthroscopy of right shoulder, arthroscopic subacromial decompression, arthroscopic distal clavicle resection, extensive debridement of SLAP tear, biceps labral complex, glenohumeral joint, subacromial space, mini-open rotator cuff repair and biceps tenodesis. Strain of muscle(s) and tendon(s) of the rotator cuff of right shoulder, initial encounter [S46.011A]  Referring Physician:  Pham Farias MD MD Orders:  eval & treat, home exercise program and Full Motion, Full Strength; 2x/week x6 weeks. (22)   Return MD Appt:  23  Date of Onset:  Onset Date: 22 (surgery; 22 injury)  Allergies: Duloxetine and Erythromycin  Restrictions/Precautions: Post op restrictions and precautions R shoulder. Interventions Planned (Treatment may consist of any combination of the following):    Current Treatment Recommendations: Strengthening; ROM; Manual Therapy - Soft Tissue Mobilization; Manual Therapy - Joint Manipulation; Pain management; Home exercise program; Modalities     Subjective Comments: No new complaint.  Says she gets pain and has difficulty with activities lifing in front and reaching across body, like with mixing, stirring while cooking and baking. Initial:  No VAS    Post Session:  No VAS        Medications Last Reviewed: Naproxen; gabapen 1/9/2023  Updated Objective Findings:    No new measure. Treatment   Active warm up on UBE. MANUAL THERAPY: (5 minutes): Positional Release to distal pec and subscap on R for muscle restriction. Brief mid thoracic screwdriver and mid to upper thoracic pistol manipulation technique for gross joint dysfunctions. Audibles noted with each. Reports breathing easier post.   THERAPEUTIC EXERCISE: (45 minutes): Exercises for R shoulder motion with Assisted Active Isolated Stretches in supine to ER at 30, 45, and 90 degrees abduction, IR stabilized at 45 and 90 degrees abduction, ER and IR at 90 degrees flexion, abduction, forward elevation, horizontal abduction/adduction, 3\"x10 each; assisted neural tensioners to radial and medial nerves on R in supine, 3\"x10 each with movement component at elbow and at wrist.   Active closed kinetic chain motions in quadruped as per grid. Exercises for shoulder girdle strength done as per grid. Exercises as indicated. Verbal, visual, and manual guiding cues for corrected scapulohumeral mechanics with these.    Date  11/14/22 Date  11/17/22 Date  11/28/22 Date  12/1/22 Date  12/5/22 Date  12/8/22 Date  12-13-22 Date  1/4/22 Date  1/9/23   Activity/Exercise            UBE 5 min On own On own 10 minutes On own 5 min level 3 On own On own On own - UBE L 2.5 x6'   ROM/Flexibility Wall slides x15, 4 way As above See above at wall See above Active muscle balance as above As above As above Assisted as above Assisted as above     Rhythmic Stabilization: Red flex bar oscillation at 90 degrees of flexion 30 sec hold x 2    Full flexion 30 sec  Standing bilat ER with yellow band oscillation 3x30,  Bilat scaption yellow band oscil 2x5   - - Red flex bar at 30 deg flexion 30\", 90 deg flexion 30\" and full FE 30\" - -   Strength:   Shoulder ext Standing 7# 3x10 cable - Prone 2# 2x10 with tactile cues Prone on bench 3# 2x10 B UE - = Prone on bench 3# 2x15 Prone bilat  2# 2x10 -   Horiz abd/Middle Trap Prone B UE 2# 2x10 - 1# 2x10 with tactile cues, prone Prone on bench 2x10 B UE 2# Standing unilat  Horiz abd   3# cable 2x15  - Prone on bench 2# 2x15 Prone bilat  2# 2x10 Standing unilat horiz abd  Single red 2X10 ea   Scaption/Lower Trap Prone 2x10 B 1# - Prone 1# 2x10 with tactile cues Prone on bench B UE 1# 2x10 - - Prone on bench 1# 2x15 Prone bilat  1# 2x10 -   ER/IR-- 90 deg  -   - -  Prone unilat  2# 2x10 ea -   Serratus Press Bench press 3x10, 5# -  Bench press 3x10, 5# Standing unilat horiz add  3# cable 2x15 - Bench press bilateral UE 5# 2x15 - Standing unilat horiz add  Single red  2x10 ea   Row Cable 10# R UE 3x10 - Prone 3# 2x10 with tactile cues Prone 5# 3x10 B UE - -  - -   ER -- plane of scapula  -   2# 2x15 ea - 2# 2x15 ea - -   Overhead press Standing landmine press with bar 3x10 -   - -  - -   Pull down  -   - -  - -   Forward Elevation  -   _ -  Standing full arc with manual resisted serratus 2x10 -   Diagonal Ext     D1 and D2   3# cable 2x15 ea - 3# cable D1 and D2 15 ea - D1:   Single green 1x10 ea;  D2:  Single green   1x10 e   Diagonal Flex     D1 and D2   2# dumbbell 2x15 ea - Lifts on cables 7# 10 ea way - -   CKC UE  Quadruped:  Respiratory belly lift with   B UE 1x 5 breath,  L UE only 1x5 breath,  R UE only 1x5 breath;    Quadruped to downward dog  2x5   - As above  Quadruped:  Respiratory belly lift with   B UE 3x 5 breath;    Quadruped opposite lifts  2x5 ea   Quadruped:  Respiratory belly lift with   B UE 1x 5 breath;    Quadruped to child's pose, quadruped to cobra pose x5 ea     HEP: She is to continue with her HEP. She verbalizes understanding.     [employer] Portal  7/5/22: [id number]  10/13/22: [email or web address] Initial scapulo-cuff strrength    Treatment/Session Summary:    Treatment Assessment: Good effort with the exercises done today. Shoulder weakness contributing to the dificutly with functional activities. Communication/Consultation:  None today  Equipment provided today:  None  Recommendations/Intent for next treatment session: We will continue to progress R shoulder motion and re-strengthening as pain allows. Update HEP with tubing strength exercises.      Total Treatment Billable Duration:  50 minutes   Time In: 6857  Time Out: [address], PT       Charge Capture Post Session Pain  PT Visit Info  MedBridge Portal  MD Guidelines  Scanned Media  Benefits  MyChart    Future Appointments   Date Time Provider Nader Gisell   1/11/2023  3:15 PM Thersia Pilar, PT [facility]   1/17/2023  4:00 PM Thersia Pilar, PT [facility]   1/19/2023  2:30 PM Ely [place], PT [facility]   1/24/2023  1:00 PM Ely [place], PT [facility]   1/26/2023  1:00 PM Thersia Pilar, PT [facility]   1/30/2023  1:00 PM Thersia Pilar, PT [facility]   2/2/2023  1:00 PM Thersia Pilar, PT [facility]   2/6/2023  1:00 PM Thersia Pilar, PT [facility]   2/9/2023  1:00 PM Thersia Pilar, PT [facility]   2/13/2023  1:00 PM Thersia Pilar, PT [facility]   2/16/2023  1:00 PM Thersia Pilar, PT [facility]   2/20/2023  1:00 PM Thersia Pilar, PT [facility]   2/21/2023  2:30 PM Kristy Nguyễn MD [facility]   2/23/2023  1:00 PM Thersia Pilar, PT [facility]   2/27/2023  1:00 PM Thersia Pilar, PT [facility]   3/2/2023  1:00 PM [facility]

## 2023-01-11 ENCOUNTER — HOSPITAL ENCOUNTER (OUTPATIENT)
Dept: PHYSICAL THERAPY | Age: 38
Setting detail: RECURRING SERIES
Discharge: HOME | End: 2023-01-14
Payer: COMMERCIAL

## 2023-01-11 PROCEDURE — 97110 THERAPEUTIC EXERCISES: CPT

## 2023-01-11 NOTE — PROGRESS NOTES
Aydee Landeros  : 1985  Primary: Bhargavi Allison  Secondary:  [facility]  [address] Roxane Toney [facility]  [place] [address]  Phone: [phone]  Fax: [phone] Plan Frequency: 2x/week for an additional 12 weeks    Plan of Care/Certification Expiration Date: 23      PT Visit Info: Total # of Visits to Date: 52  Progress Note Counter: 4     Visit Count:  52   OUTPATIENT PHYSICAL THERAPY:OP NOTE TYPE: Treatment Note 2023       Episode  Appt Desk             Aydee Landeros transfers to [address] [place] [address] from [address] [place] [address] for continuation of her current treatment plan. Treatment Diagnosis:  Pain in Right Shoulder (M25.511)  Stiffness of Right Shoulder, Not elsewhere classified (M25.611)   Strain of muscle(s) and tendon(s) of the rotator cuff of right shoulder, sequela (S46.011S)  Medical/Referring Diagnosis: s/p  Arthroscopy of right shoulder, arthroscopic subacromial decompression, arthroscopic distal clavicle resection, extensive debridement of SLAP tear, biceps labral complex, glenohumeral joint, subacromial space, mini-open rotator cuff repair and biceps tenodesis. Strain of muscle(s) and tendon(s) of the rotator cuff of right shoulder, initial encounter [S46.011A]  Referring Physician:  Maggie Pritchett MD MD Orders:  eval & treat, home exercise program and Full Motion, Full Strength; 2x/week x6 weeks. (22)   Return MD Appt:  23  Date of Onset:  Onset Date: 22 (surgery; 22 injury)  Allergies: Duloxetine and Erythromycin  Restrictions/Precautions: Post op restrictions and precautions R shoulder. Interventions Planned (Treatment may consist of any combination of the following):    Current Treatment Recommendations: Strengthening; ROM; Manual Therapy - Soft Tissue Mobilization; Manual Therapy - Joint Manipulation; Pain management; Home exercise program; Modalities     Subjective Comments: Says her shoulder is a little sore today.  Says her back and breathing felt better after last time, and she was able to sleep better the past 2 nights. Initial:  No VAS    Post Session:  No VAS        Medications Last Reviewed: Naproxen; gabapen 1/11/2023  Updated Objective Findings:    No new measure. Treatment   Active warm up on UBE. MANUAL THERAPY: (5 minutes): Mid thoracic screwdriver and mid to upper thoracic pistol manipulation technique for gross joint dysfunctions. Audibles noted with each. THERAPEUTIC EXERCISE: (40 minutes): Active spine mobility with quadruped respiratory belly lift 1x 5 breath hold; quadruped to child's pose and to cobra pose x10 total each. Exercises for R shoulder motion with Assisted Active Isolated Stretches in supine to ER at 30, 45, and 90 degrees abduction, IR stabilized at 45 and 90 degrees abduction, ER and IR at 90 degrees flexion, abduction, forward elevation, horizontal abduction/adduction, D1 and D2 flexion, 3\"x10 each; assisted neural tensioners to radial, median, and ulnar nerves on R in supine, 3\"x10 each with movement component at elbow and at wrist.   Exercises for shoulder girdle strength done as per grid. Exercises as indicated. Verbal, visual, and manual guiding cues for corrected scapulohumeral mechanics with these.    Date  12-13-22 Date  1/4/22 Date  1/9/23 Date  1/11/23   Activity/Exercise       UBE On own - UBE L 2.5 x6' L. 2 x8'   ROM/Flexibility As above Assisted as above Assisted as above   Assisted as above   Rhythmic Stabilization: Red flex bar at 30 deg flexion 30\", 90 deg flexion 30\" and full FE 30\" - - Standing ball drops at 90-deg scaption light ball 2x30 rep ea   Strength:   Shoulder ext Prone on bench 3# 2x15 Prone bilat  2# 2x10 - Standing bilat  Blue tubing  2x15   Horiz abd/Middle Trap Prone on bench 2# 2x15 Prone bilat  2# 2x10 Standing unilat horiz abd  Single red 2X10 ea -   Scaption/Lower Trap Prone on bench 1# 2x15 Prone bilat  1# 2x10 - Bilat wall slide Y with UE lift off/eccentric return  Yellow heavy band 3x5   ER/IR-- 90 deg  Prone unilat  2# 2x10 ea - -   Serratus Press Bench press bilateral UE 5# 2x15 - Standing unilat horiz add  Single red  2x10 ea standing press, upward, bilat  Blue tubing 2x15    Row  - - Standing, bilat  Blue tubing 2x15   ER -- plane of scapula 2# 2x15 ea - - -   Overhead press  - - -   Pull down  - - -   ER/IR--neutral shoulder    Short arc motion  Single blue  2x10 ea   Forward Elevation  Standing full arc with manual resisted serratus 2x10 - ER/forward reach combo  2x10  IR/Forward reach combo  Blue 2x10   Diagonal Ext 3# cable D1 and D2 15 ea - D1:   Single green 1x10 ea;  D2:  Single green   1x10 e -   Diagonal Flex Lifts on cables 7# 10 ea way - - -   CKC UE  Quadruped:  Respiratory belly lift with   B UE 3x 5 breath;    Quadruped opposite lifts  2x5 ea   Quadruped:  Respiratory belly lift with   B UE 1x 5 breath;    Quadruped to child's pose, quadruped to cobra pose x5 ea Quadruped opposite U/LE lift 3-5\" x5 ea     HEP: She is to continue with her HEP. She verbalizes understanding. [employer] Portal  7/5/22: [id number]  10/13/22: [id number] Initial scapulo-cuff strrength    Treatment/Session Summary:    Treatment Assessment: Very good effort and performance of the exercises done today. Weakness is primary impairment limting the R UE. She continues to have pain to it. Communication/Consultation:  None today  Equipment provided today:  None  Recommendations/Intent for next treatment session: We will continue to progress R shoulder motion and re-strengthening as pain allows. Update HEP with tubing strength exercises.      Total Treatment Billable Duration:  45 minutes   Time In: 7972  Time Out: [address], PT       Charge Capture Post Session Pain  PT Visit Info  [employer] Portal  MD Guidelines  Scanned Media  Benefits  MyChart    Future Appointments   Date Time Provider Nader Jameson   1/17/2023  4:00 PM [facility] 1/19/2023  2:30 PM Abdiel Sadler, PT [facility]   1/23/2023  4:00 PM Gwynda Specter, PT [facility]   1/25/2023  4:00 PM Gwynda Specter, PT [facility]   1/30/2023  1:00 PM True Neil, PT [facility]   2/2/2023  1:00 PM True Neil, PT [facility]   2/6/2023  1:00 PM Gwynda Specter, PT [facility]   2/9/2023  1:00 PM True Neil, PT [facility]   2/13/2023  1:00 PM True Neil, PT [facility]   2/16/2023  1:00 PM True Neil, PT [facility]   2/21/2023  2:30 PM Ivonne Hinton, MD [facility]   2/23/2023  4:00 PM True Neil, PT [facility]   2/27/2023  1:00 PM True Neil, PT [facility]   3/2/2023  1:00 PM True Neil, PT [facility]   3/20/2023  4:00 PM True Neil, PT [facility]   3/23/2023  4:00 PM True Neil, PT [facility]

## 2023-01-17 ENCOUNTER — HOSPITAL ENCOUNTER (OUTPATIENT)
Dept: PHYSICAL THERAPY | Age: 38
Setting detail: RECURRING SERIES
Discharge: HOME | End: 2023-01-20
Payer: COMMERCIAL

## 2023-01-17 PROCEDURE — 97110 THERAPEUTIC EXERCISES: CPT

## 2023-01-17 NOTE — PROGRESS NOTES
Sharyn Ledbetter  : 1985  Primary: Amaya Allison  Secondary:  [facility]  [address] Shea Miller [facility]  [place] [address]  Phone: [phone]  Fax: [phone] Plan Frequency: 2x/week for an additional 12 weeks    Plan of Care/Certification Expiration Date: 23      PT Visit Info: Total # of Visits to Date: 48  Progress Note Counter: 5     Visit Count:  48   OUTPATIENT PHYSICAL THERAPY:OP NOTE TYPE: Treatment Note 2023       Episode  Appt Desk             Sharyn Ledbetter transfers to [address] [place] [address] from [address] [place] [address] for continuation of her current treatment plan. Treatment Diagnosis:  Pain in Right Shoulder (M25.511)  Stiffness of Right Shoulder, Not elsewhere classified (M25.611)   Strain of muscle(s) and tendon(s) of the rotator cuff of right shoulder, sequela (S46.011S)  Medical/Referring Diagnosis: s/p  Arthroscopy of right shoulder, arthroscopic subacromial decompression, arthroscopic distal clavicle resection, extensive debridement of SLAP tear, biceps labral complex, glenohumeral joint, subacromial space, mini-open rotator cuff repair and biceps tenodesis. Strain of muscle(s) and tendon(s) of the rotator cuff of right shoulder, initial encounter [S46.011A]  Referring Physician:  Krista Carcamo.MD STOVALL Orders:  eval & treat, home exercise program and Full Motion, Full Strength; 2x/week x6 weeks. (22)   Return MD Appt:  23  Date of Onset:  Onset Date: 22 (surgery; 22 injury)  Allergies: Duloxetine and Erythromycin  Restrictions/Precautions: Post op restrictions and precautions R shoulder. Interventions Planned (Treatment may consist of any combination of the following):    Current Treatment Recommendations: Strengthening; ROM; Manual Therapy - Soft Tissue Mobilization; Manual Therapy - Joint Manipulation; Pain management; Home exercise program; Modalities     Subjective Comments: Still having shoulder pain.  It is a 6/10 when she moves her right arm across her chest into adduction. Initial:    6/10 /Post Session:      4 /10  Medications Last Reviewed: Naproxen; gabapen 1/17/2023  Updated Objective Findings:    No new measure. Treatment   Active warm up on UBE. MANUAL THERAPY: (5 minutes): Mid thoracic screwdriver echnique for gross joint dysfunctions. Audibles noted. THERAPEUTIC EXERCISE: (40 minutes): Active spine mobility with quadruped respiratory belly lift 1x 5 breath hold; quadruped to child's pose and to cobra pose x10 total each. Exercises for R shoulder motion with Assisted Active Isolated Stretches in supine to ER at 30, 45, and 90 degrees abduction, IR stabilized at 45 and 90 degrees abduction, ER and IR at 90 degrees flexion, abduction, forward elevation, horizontal abduction/adduction, D1 and D2 flexion, 3\"x10 each; assisted neural tensioners to radial, median, and ulnar nerves on R in supine, 3\"x10 each with movement component at elbow and at wrist.   Exercises for shoulder girdle strength done as per grid. Exercises as indicated. Verbal, visual, and manual guiding cues for corrected scapulohumeral mechanics with these.    Date  12-13-22 Date  1/4/22 Date  1/9/23 Date  1/11/23 Date:  1/17/23   Activity/Exercise     Parameters   UBE On own - UBE L 2.5 x6' L. 2 x8' L2 x8'   ROM/Flexibility As above Assisted as above Assisted as above   Assisted as above Assisted as above   Rhythmic Stabilization: Red flex bar at 30 deg flexion 30\", 90 deg flexion 30\" and full FE 30\" - - Standing ball drops at 90-deg scaption light ball 2x30 rep ea Standing ball drops at 90-degrees scaption light ball 2x30    Strength:   Shoulder ext Prone on bench 3# 2x15 Prone bilat  2# 2x10 - Standing bilat  Blue tubing  2x15 Standing bilateral blue tubing 2x15   Horiz abd/Middle Trap Prone on bench 2# 2x15 Prone bilat  2# 2x10 Standing unilat horiz abd  Single red 2X10 ea -    Scaption/Lower Trap Prone on bench 1# 2x15 Prone bilat  1# 2x10 - Bilat wall slide Y with UE lift off/eccentric return  Yellow heavy band 3x5 Bilateral wall slides Y w/ UE lift off eccentric return  Yellow heavy band 3x5   ER/IR-- 90 deg  Prone unilat  2# 2x10 ea - -    Serratus Press Bench press bilateral UE 5# 2x15 - Standing unilat horiz add  Single red  2x10 ea standing press, upward, bilat  Blue tubing 2x15  Standing press, upward bilateral blue tubing 2x15   Row  - - Standing, bilat  Blue tubing 2x15 Standing, bilateral blue tubing 2x15   ER -- plane of scapula 2# 2x15 ea - - -    Overhead press  - - -    Pull down  - - -    ER/IR--neutral shoulder    Short arc motion  Single blue  2x10 ea Short arc motion single blue 2x10 ea   Forward Elevation  Standing full arc with manual resisted serratus 2x10 - ER/forward reach combo  2x10  IR/Forward reach combo  Blue 2x10 ER/forward reach combo 2x10  IR/forward reach combo blue 2x10   Diagonal Ext 3# cable D1 and D2 15 ea - D1:   Single green 1x10 ea;  D2:  Single green   1x10 e - D1: blue band 15x  D2: blue band 15x   Diagonal Flex Lifts on cables 7# 10 ea way - - -    CKC UE  Quadruped:  Respiratory belly lift with   B UE 3x 5 breath;    Quadruped opposite lifts  2x5 ea   Quadruped:  Respiratory belly lift with   B UE 1x 5 breath;    Quadruped to child's pose, quadruped to cobra pose x5 ea Quadruped opposite U/LE lift 3-5\" x5 ea Quadruped opposite UE/LE lift 3-5\" x5 each     HEP: She is to continue with her HEP. She verbalizes understanding. [employer] Portal  7/5/22: [id number]  10/13/22: [id number] Initial scapulo-cuff strrength    Treatment/Session Summary:    Treatment Assessment: Pt required minimal cues for proper performance and posture with exercises. Adduction is her motion of most pain. Pt educated on the importance of posture and thoracic mobility in regards to shoulder motion. No new exercises added today. Communication/Consultation:  None today  Equipment provided today:  None  Recommendations/Intent for next treatment session:  We will continue to progress R shoulder motion and re-strengthening as pain allows. Update HEP with tubing strength exercises.      Total Treatment Billable Duration:  45 minutes (40 mins therex, 5 mins manual)   Time In: 1600  Time Out: 2776 Pedro Pete, PT       Charge Capture Post Session Pain  PT Visit Info  MedBridge Portal  MD Guidelines  Scanned Media  Benefits  MyChart    Future Appointments   Date Time Provider Nader Gisell   1/19/2023  2:30 PM Alton Campos, PT [facility]   1/23/2023  4:00 PM Cheril Dakin, PT [facility]   1/25/2023  4:00 PM Cheril Dakin, PT [facility]   1/30/2023  1:00 PM Sheilda Bridger, PT [facility]   2/2/2023  1:00 PM Sheilda Bridger, PT [facility]   2/6/2023  1:00 PM Cheril Dakin, PT [facility]   2/9/2023  1:00 PM Sheilda Bridger, PT [facility]   2/13/2023  1:00 PM Sheilda Bridger, PT [facility]   2/16/2023  1:00 PM Sheilda Bridger, PT [facility]   2/21/2023  2:30 PM Jono Stoner MD [facility]   2/23/2023  4:00 PM Sheilda Bridger, PT [facility]   2/27/2023  1:00 PM Sheilda Bridger, PT [facility]   3/2/2023  1:00 PM Sheilda Bridger, PT [facility]   3/20/2023  4:00 PM Sheilda Bridger, PT [facility]   3/23/2023  4:00 PM [facility]

## 2023-01-18 ASSESSMENT — PAIN SCALES - GENERAL: PAINLEVEL_OUTOF10: 6

## 2023-01-19 ENCOUNTER — HOSPITAL ENCOUNTER (OUTPATIENT)
Dept: PHYSICAL THERAPY | Age: 38
Setting detail: RECURRING SERIES
Discharge: HOME | End: 2023-01-22
Payer: COMMERCIAL

## 2023-01-19 PROCEDURE — 97110 THERAPEUTIC EXERCISES: CPT

## 2023-01-19 NOTE — PROGRESS NOTES
Perfecto Espinosa  : 1985  Primary: Cynthia Allison  Secondary:  [facility]  [address]  [place] [address]  Phone: [phone]  Fax: [phone] Plan Frequency: 2x/week for an additional 12 weeks    Plan of Care/Certification Expiration Date: 23      PT Visit Info: Total # of Visits to Date: 46  Progress Note Counter: 6     Visit Count:  46   OUTPATIENT PHYSICAL THERAPY:OP NOTE TYPE: Treatment Note 2023       Episode  Appt Desk           Treatment Diagnosis:  Pain in Right Shoulder (M25.511)  Stiffness of Right Shoulder, Not elsewhere classified (M25.611)   Strain of muscle(s) and tendon(s) of the rotator cuff of right shoulder, sequela (S46.011S)  Medical/Referring Diagnosis: s/p  Arthroscopy of right shoulder, arthroscopic subacromial decompression, arthroscopic distal clavicle resection, extensive debridement of SLAP tear, biceps labral complex, glenohumeral joint, subacromial space, mini-open rotator cuff repair and biceps tenodesis. Strain of muscle(s) and tendon(s) of the rotator cuff of right shoulder, initial encounter [S46.011A]  Referring Physician:  Selena Bowers MD MD Orders:  eval & treat, home exercise program and Full Motion, Full Strength; 2x/week x6 weeks. (22)   Return MD Appt:  23  Date of Onset:  Onset Date: 22 (surgery; 22 injury)  Allergies: Duloxetine and Erythromycin  Restrictions/Precautions: Post op restrictions and precautions R shoulder. Interventions Planned (Treatment may consist of any combination of the following):    Current Treatment Recommendations: Strengthening; ROM; Manual Therapy - Soft Tissue Mobilization; Manual Therapy - Joint Manipulation; Pain management; Home exercise program; Modalities     Subjective Comments: No new complaints to R shoulder. Initial:  No VAS. Post Session:    No VAS. Medications Last Reviewed: Naproxen; gabapen 2023  Updated Objective Findings:    No new measure.   Treatment   Active warm up on UBE.  THERAPEUTIC EXERCISE: (40 minutes): Exercises for shoulder AROM wall slides to flex, horiz abd/add, D1 and D2 flexion x15 each; active Median nerve tensioner 3\"x10; spine mobility with quadruped respiratory belly lift 1x 5 breath hold; quadruped to child's pose and to cobra pose x10 total each.   Exercises for shoulder girdle strength done as per grid. Exercises as indicated. Verbal, visual, and manual guiding cues for corrected scapulohumeral mechanics with these.   Date  12-13-22 Date  1/4/22 Date  1/9/23 Date  1/11/23 Date:  1/17/23 Date  1/19/23   Activity/Exercise     Parameters    UBE On own - UBE L 2.5 x6' L. 2 x8' L2 x8' L. 2 x8'   ROM/Flexibility As above Assisted as above Assisted as above   Assisted as above Assisted as above Active wall slide 4 ways x15 ea;  Active mobility as above   Rhythmic Stabilization: Red flex bar at 30 deg flexion 30\", 90 deg flexion 30\" and full FE 30\" - - Standing ball drops at 90-deg scaption light ball 2x30 rep ea Standing ball drops at 90-degrees scaption light ball 2x30  Standing ball drops at 90-degrees scaption, flexion, abduciton with light ball x30 ea   Strength:   Shoulder ext Prone on bench 3# 2x15 Prone bilat  2# 2x10 - Standing bilat  Blue tubing  2x15 Standing bilateral blue tubing 2x15 -   Horiz abd/Middle Trap Prone on bench 2# 2x15 Prone bilat  2# 2x10 Standing unilat horiz abd  Single red 2X10 ea -  Standing Horiz abdunilat single blue tubing x20   Scaption/Lower Trap Prone on bench 1# 2x15 Prone bilat  1# 2x10 - Bilat wall slide Y with UE lift off/eccentric return  Yellow heavy band 3x5 Bilateral wall slides Y w/ UE lift off eccentric return  Yellow heavy band 3x5 -   ER/IR-- 90 deg  Prone unilat  2# 2x10 ea - -  -   Serratus Press Bench press bilateral UE 5# 2x15 - Standing unilat horiz add  Single red  2x10 ea standing press, upward, bilat  Blue tubing 2x15  Standing press, upward bilateral blue tubing 2x15 -   Row  - -  Standing, bilat  Blue tubing 2x15 Standing, bilateral blue tubing 2x15 -   ER -- plane of scapula 2# 2x15 ea - - -  2# 2x15 ea   Overhead press  - - -  -   Pull down  - - -  -   ER/IR--neutral shoulder    Short arc motion  Single blue  2x10 ea Short arc motion single blue 2x10 ea Standing ER, IR  single blue 2x15 ea   Forward Elevation  Standing full arc with manual resisted serratus 2x10 - ER/forward reach combo  2x10  IR/Forward reach combo  Blue 2x10 ER/forward reach combo 2x10  IR/forward reach combo blue 2x10 Landmine press stick+red tubing 2x10   Diagonal Ext 3# cable D1 and D2 15 ea - D1:   Single green 1x10 ea;  D2:  Single green   1x10 e - D1: blue band 15x  D2: blue band 15x Single blue tubing 1x20 ea   Diagonal Flex Lifts on cables 7# 10 ea way - - -  -   CKC UE  Quadruped:  Respiratory belly lift with   B UE 3x 5 breath;    Quadruped opposite lifts  2x5 ea   Quadruped:  Respiratory belly lift with   B UE 1x 5 breath;    Quadruped to child's pose, quadruped to cobra pose x5 ea Quadruped opposite U/LE lift 3-5\" x5 ea Quadruped opposite UE/LE lift 3-5\" x5 each Quadruped opposite 2x5 ea;  Quadruped to downward dog 2x5     HEP: She is to continue with her HEP. She verbalizes understanding. [employer] Portal  7/5/22: [id number]  10/13/22: [id number] Initial scapulo-cuff strrength    Treatment/Session Summary:    Treatment Assessment: Good effort and performance of the exercises done today. Communication/Consultation:  None today  Equipment provided today:  None  Recommendations/Intent for next treatment session: We will continue to progress R shoulder motion and re-strengthening.      Total Treatment Billable Duration:  40 minutes   Time In: 0566  Time Out: [address], PT       Charge Capture Post Session Pain  PT Visit Info  [employer] Portal  MD Guidelines  Scanned Media  Benefits  MyChart    Future Appointments   Date Time Provider Nader Jameson   1/23/2023  4:00 PM Antonella Chang PT [facility]   1/25/2023  4:00 PM Cheryljarrell ORE Desdune-Tony, PT [facility]   1/30/2023  1:00 PM Abby Bingham, PT [facility]   2/2/2023  1:00 PM Abby Bingham, PT [facility]   2/6/2023  1:00 PM Cherylann C Desdune-Tony, PT [facility]   2/9/2023  1:00 PM Abby Bingham, PT [facility]   2/13/2023  1:00 PM Abby Bingham, PT [facility]   2/16/2023  1:00 PM Abby Bingham, PT [facility]   2/21/2023  2:30 PM MD BUBBA Pandey [facility]   [facility]23[facility]  [facility]   2/27/2023  1:00 PM [facility]   3/2/2023  1:00 PM [facility]   3/20/2023  4:00 PM Abby Bingham, PT [facility]   3/23/2023  4:00 PM [facility]

## 2023-01-19 NOTE — PROGRESS NOTES
Millicent Bhakta  : 1985  Primary: [place]  Secondary:  [facility]  [address]  [address]  [facility] [address]  Phone: [phone]  Fax: [phone]    PT Visit Info:    Total # of Visits to Date: 50  Progress Note Counter: 5  No Show: 1     OT Visit Info:  No data recorded    OUTPATIENT THERAPY:OP NOTE TYPE: Progress Report 2023               Episode  Appt Desk           Millicent Bhakta did not show for her appointment for today due to unknown reasons.  Will plan to follow up next during next appointment.  Thank you,  Hyacinth Sanders, PT    Future Appointments   Date Time Provider Department Center   2023  4:00 PM Margot Mace, PT [facility]   2023  4:00 PM Carlos Lozano, PT [facility]   2023  1:00 PM Agusto Garcia, PT [facility]   2023  1:00 PM Agusto Garcia, PT [facility]   2023  1:00 PM Carlos Lozano, PT [facility]   2023  1:00 PM Agusto Garcia, PT [facility]   2023  1:00 PM Agusto Garcia, PT [facility]   2023  1:00 PM Agusto Garcia, PT [facility]   2023  2:30 PM Mauri Mcghee Jr., MD [facility]   2023  4:00 PM Agusto Garcia, PT [facility]   2023  1:00 PM Agusto Garcia, PT [facility]   3/2/2023  1:00 PM Agusto Garcia, PT [facility]   3/20/2023  4:00 PM Agusto Garcia, PT [facility]   3/23/2023  4:00 PM Agusto Garcia, PT [facility]

## 2023-01-23 ENCOUNTER — HOSPITAL ENCOUNTER (OUTPATIENT)
Dept: PHYSICAL THERAPY | Age: 38
Setting detail: RECURRING SERIES
Discharge: HOME | End: 2023-01-26
Payer: COMMERCIAL

## 2023-01-23 PROCEDURE — 97110 THERAPEUTIC EXERCISES: CPT

## 2023-01-23 ASSESSMENT — PAIN SCALES - GENERAL: PAINLEVEL_OUTOF10: 2

## 2023-01-23 NOTE — PROGRESS NOTES
Audrey Blackburn  : 1985  Primary: Darwin Becker [place]  Secondary:  [facility]  [address] [place]ary [place] [address] [place], [place] [address] [address]  Phone: [phone]  Fax: [phone] Plan Frequency: 2x/week for an additional 12 weeks    Plan of Care/Certification Expiration Date: 23      PT Visit Info: Total # of Visits to Date: 46  Progress Note Counter: 6  No Show: 1     Visit Count:  46   OUTPATIENT PHYSICAL THERAPY:OP NOTE TYPE: Treatment Note 2023       Episode  Appt Desk           Treatment Diagnosis:  Pain in Right Shoulder (M25.511)  Stiffness of Right Shoulder, Not elsewhere classified (M25.611)   Strain of muscle(s) and tendon(s) of the rotator cuff of right shoulder, sequela (S46.011S)  Medical/Referring Diagnosis: s/p  Arthroscopy of right shoulder, arthroscopic subacromial decompression, arthroscopic distal clavicle resection, extensive debridement of SLAP tear, biceps labral complex, glenohumeral joint, subacromial space, mini-open rotator cuff repair and biceps tenodesis. Strain of muscle(s) and tendon(s) of the rotator cuff of right shoulder, initial encounter [S46.011A]  Referring Physician:  Deven Briceño MD MD Orders:  eval & treat, home exercise program and Full Motion, Full Strength; 2x/week x6 weeks. (22)   Return MD Appt:  23  Date of Onset:  Onset Date: 22 (surgery; 22 injury)  Allergies: Duloxetine and Erythromycin  Restrictions/Precautions: Post op restrictions and precautions R shoulder. Interventions Planned (Treatment may consist of any combination of the following):    Current Treatment Recommendations: Strengthening; ROM; Manual Therapy - Soft Tissue Mobilization; Manual Therapy - Joint Manipulation; Pain management; Home exercise program; Modalities     Subjective Comments: Patient 40 min late to PT. She denies complaints of R shoulder pain. She reports fatigue only with certain activities such as cleaning. Initial:  No VAS.    2     Post Session:    No VAS.   2   Medications Last Reviewed: Naproxen; gabapen 1/23/2023  Updated Objective Findings:    No new measure. Treatment   Active warm up on UBE. THERAPEUTIC EXERCISE: (60 minutes): Exercises for shoulder AROM wall slides to flex, horiz abd/add, D1 and D2 flexion x15 each; active Median nerve tensioner 3\"x10; spine mobility with quadruped respiratory belly lift 1x 5 breath hold; quadruped to child's pose and to cobra pose x10 total each. Exercises for shoulder girdle strength done as per grid. Exercises as indicated. Verbal, visual, and manual guiding cues for corrected scapulohumeral mechanics with these. Date  1/4/22 Date  1/9/23 Date  1/11/23 Date:  1/17/23 Date  1/19/23 Date:  1/23/23   Activity/Exercise    Parameters     UBE - UBE L 2.5 x6' L. 2 x8' L2 x8' L. 2 x8' L3 8min; R UE only; alt forw/back   ROM/Flexibility Assisted as above Assisted as above   Assisted as above Assisted as above Active wall slide 4 ways x15 ea;   Active mobility as above    Rhythmic Stabilization: - - Standing ball drops at 90-deg scaption light ball 2x30 rep ea Standing ball drops at 90-degrees scaption light ball 2x30  Standing ball drops at 90-degrees scaption, flexion, abduciton with light ball x30 ea    Strength:   Shoulder ext Prone bilat  2# 2x10 - Standing bilat  Blue tubing  2x15 Standing bilateral blue tubing 2x15 - Standing B w/palms forw and back w/ new green tubing; 20x   Horiz abd/Middle Trap Prone bilat  2# 2x10 Standing unilat horiz abd  Single red 2X10 ea -  Standing Horiz abdunilat single blue tubing x20 Standing horiz abd w/ new green tubing; 20x   Scaption/Lower Trap Prone bilat  1# 2x10 - Bilat wall slide Y with UE lift off/eccentric return  Yellow heavy band 3x5 Bilateral wall slides Y w/ UE lift off eccentric return  Yellow heavy band 3x5 -    ER/IR-- 90 deg Prone unilat  2# 2x10 ea - -  - Stdg w/red tubing @ 90/90 w/ scap stab A; 15x each   Standing dynamic stab w/tband perpendicular      2#; RTB; 15x each direction   Serratus Press - Standing unilat horiz add  Single red  2x10 ea standing press, upward, bilat  Blue tubing 2x15  Standing press, upward bilateral blue tubing 2x15 -    Row - - Standing, bilat  Blue tubing 2x15 Standing, bilateral blue tubing 2x15 -    ER -- plane of scapula - - -  2# 2x15 ea    Overhead press - - -  -    Pull down - - -  -    ER/IR--neutral shoulder   Short arc motion  Single blue  2x10 ea Short arc motion single blue 2x10 ea Standing ER, IR  single blue 2x15 ea    Forward Elevation Standing full arc with manual resisted serratus 2x10 - ER/forward reach combo  2x10  IR/Forward reach combo  Blue 2x10 ER/forward reach combo 2x10  IR/forward reach combo blue 2x10 Landmine press stick+red tubing 2x10    Diagonal Ext - D1:   Single green 1x10 ea;  D2:  Single green   1x10 e - D1: blue band 15x  D2: blue band 15x Single blue tubing 1x20 ea    Diagonal Flex - - -  -    Thread the needle      5x B for post capsular stretching   [facility] UE Quadruped:  Respiratory belly lift with   B UE 3x 5 breath;    Quadruped opposite lifts  2x5 ea   Quadruped:  Respiratory belly lift with   B UE 1x 5 breath;    Quadruped to child's pose, quadruped to cobra pose x5 ea Quadruped opposite U/LE lift 3-5\" x5 ea Quadruped opposite UE/LE lift 3-5\" x5 each Quadruped opposite 2x5 ea;  Quadruped to downward dog 2x5 Quadruped alt UE/LE; 15x each;  Cat/cow moving into wil pose; 15x;  Puppy pose; 5x;  Down dog moving into plank; 15x  Modified plank (knees/forearms); 10 x 10sec     HEP: She is to continue with her HEP. She verbalizes understanding. [employer] Portal  7/5/22: [id number]  10/13/22: [id number] Initial scapulo-cuff strrength    Treatment/Session Summary:    Treatment Assessment: Patient fatigued with [facility] weight bearing exercises today. She required scapular stabilization to perform tband ER/IR @ 90/90 w/out substitution.  Modified planks performed from knees as patient too fearful to try full forearm plank today. Overall, patient works hard in PT, and is progressing toward current goals. She seems to be nearing DC. Communication/Consultation:  None today  Equipment provided today:  None  Recommendations/Intent for next treatment session: We will continue to progress R shoulder motion and re-strengthening.      Total Treatment Billable Duration:  60 minutes   Time In: 1640  Time Out: 1740    Odalis Knutson, PT       Charge Capture Post Session Pain  PT Visit Info  [employer]Bridge Portal  MD Guidelines  Scanned Media  Benefits  MyChart    Future Appointments   Date Time Provider Nader Jameson   1/25/2023  4:00 PM Carlos Lozano, PT [facility]   1/30/2023  1:00 PM Etelvina Rosenberg, PT [facility]   2/2/2023  1:00 PM Etelvinakyle Rosenberg, PT [facility]   2/6/2023  1:00 PM Carlos Lozano, PT [facility]   2/9/2023  1:00 PM Etelvina Rosenberg, PT [facility]   2/13/2023  1:00 PM Etelvina Rosenberg, PT [facility]   2/16/2023  1:00 PM Etelvina Rosenberg, PT [facility]   2/21/2023  2:30 PM MD BUBBA Martins [facility]   2/23/2023  4:00 PM Etelvinakyle Rosenberg, PT [facility]   2/27/2023  1:00 PM Etelvina Rosenberg, PT [facility]   3/2/2023  1:00 PM Etelvina Rosenberg, PT [facility]   3/20/2023  4:00 PM Etelvina Rosenberg, PT [facility]   3/23/2023  4:00 PM Etelvinakyle Rosenberg, PT [facility]

## 2023-01-23 NOTE — PROGRESS NOTES
Violeta Quinones  : 1985  Primary: Nitza Plant [place]  Secondary:  [facility]  [address] Paulette Ray [address] [address]  Phone: [phone]  Fax: [phone]    PT Visit Info: Total # of Visits to Date: 46  Progress Note Counter: 6  No Show: 1        OUTPATIENT THERAPY:OP NOTE TYPE: Progress Report 2023               Episode  Appt Desk           Violeta Quinones did not show for her appointment for today due to unknown reasons. Will plan to follow up during next appointment.   Thank you,  Marianna Leiva, PT    Future Appointments   Date Time Provider Nader Jameson   2023  4:00 PM Carlos Mello, PT [facility]   2023  1:00 PM Molly Lombardi, PT [facility]   2023  1:00 PM Molly Lombardi, PT [facility]   2023  1:00 PM Carlos Lozano, PT [facility]   2023  1:00 PM Molly Lombardi, PT [facility]   2023  1:00 PM Molly Lombardi, PT [facility]   [facility]  [facility]   2023  2:30 PM MD BUBBA Hatfield [facility]   2023  4:00 PM Molly Lombardi, PT [facility]   2023  1:00 PM Molly Lombardi, PT [facility]   3/2/2023  1:00 PM Molly Lombardi, PT [facility]   3/20/2023  4:00 PM Molly Lombardi, PT [facility]   3/23/2023  4:00 PM Molly Lombardi, PT [facility]

## 2023-01-25 ENCOUNTER — HOSPITAL ENCOUNTER (OUTPATIENT)
Dept: PHYSICAL THERAPY | Age: 38
Setting detail: RECURRING SERIES
Discharge: HOME | End: 2023-01-28
Payer: COMMERCIAL

## 2023-01-25 PROCEDURE — 97110 THERAPEUTIC EXERCISES: CPT

## 2023-01-25 NOTE — PROGRESS NOTES
Ramakrishna Barbour  : 1985  Primary: Caro Allison  Secondary:  [facility]ILLENNI[facility]  [address] [place] [address]  Phone: [phone]  Fax: [phone] Plan Frequency: 2x/week for an additional 12 weeks    Plan of Care/Certification Expiration Date: 23      PT Visit Info: Total # of Visits to Date: 48  Progress Note Counter: 8  No Show: 1     Visit Count:  48   OUTPATIENT PHYSICAL THERAPY:OP NOTE TYPE: Treatment Note 2023       Episode  Appt Desk           Treatment Diagnosis:  Pain in Right Shoulder (M25.511)  Stiffness of Right Shoulder, Not elsewhere classified (M25.611)   Strain of muscle(s) and tendon(s) of the rotator cuff of right shoulder, sequela (S46.011S)  Medical/Referring Diagnosis: s/p  Arthroscopy of right shoulder, arthroscopic subacromial decompression, arthroscopic distal clavicle resection, extensive debridement of SLAP tear, biceps labral complex, glenohumeral joint, subacromial space, mini-open rotator cuff repair and biceps tenodesis. Strain of muscle(s) and tendon(s) of the rotator cuff of right shoulder, initial encounter [S46.011A]  Referring Physician:  Anand Farrell MD MD Orders:  eval & treat, home exercise program and Full Motion, Full Strength; 2x/week x6 weeks. (22)   Return MD Appt:  23  Date of Onset:  Onset Date: 22 (surgery; 22 injury)  Allergies: Duloxetine and Erythromycin  Restrictions/Precautions: Post op restrictions and precautions R shoulder. Interventions Planned (Treatment may consist of any combination of the following):    Current Treatment Recommendations: Strengthening; ROM; Manual Therapy - Soft Tissue Mobilization; Manual Therapy - Joint Manipulation; Pain management; Home exercise program; Modalities     Subjective Comments: Says she is pretty work out from working in the [employer] the past few days. Initial:  No VAS. Post Session:    No VAS.        Medications Last Reviewed: Naproxen; gregg 1/25/2023  Updated Objective Findings:    No new measure. Treatment   Active warm up on UBE, then 4-ways wall slides and active median and radial nerve tensioners on own. MANUAL THERAPY: (5 minutes): Mid thoracic screwdriver and mid to upper thoracic pistol manipulation technique for gross joint dysfunctions. THERAPEUTIC EXERCISE: (30 minutes): Exercises for spine mobility with quadruped respiratory belly lift 1x 5 breath hold and quadruped to child's pose x10; and passive cervical retraction and extension in supine x10 each; Exercises for shoulder girdle strength done as per grid. Exercises as indicated. Verbal, visual, and manual guiding cues for corrected scapulohumeral mechanics with these. Date  1/4/22 Date  1/9/23 Date  1/11/23 Date:  1/17/23 Date  1/19/23 Date:  1/23/23 Date  1/25/23   Activity/Exercise    Parameters      UBE - UBE L 2.5 x6' L. 2 x8' L2 x8' L. 2 x8' L3 8min; R UE only; alt forw/back L. 3 x8' on own   ROM/Flexibility Assisted as above Assisted as above   Assisted as above Assisted as above Active wall slide 4 ways x15 ea;   Active mobility as above  As above    Rhythmic Stabilization: - - Standing ball drops at 90-deg scaption light ball 2x30 rep ea Standing ball drops at 90-degrees scaption light ball 2x30  Standing ball drops at 90-degrees scaption, flexion, abduciton with light ball x30 ea  -   Strength:   Shoulder ext Prone bilat  2# 2x10 - Standing bilat  Blue tubing  2x15 Standing bilateral blue tubing 2x15 - Standing B w/palms forw and back w/ new green tubing; 20x Standing bilateral blue tubing 2x15   Horiz abd/Middle Trap Prone bilat  2# 2x10 Standing unilat horiz abd  Single red 2X10 ea -  Standing Horiz abdunilat single blue tubing x20 Standing horiz abd w/ new green tubing; 20x -   Scaption/Lower Trap Prone bilat  1# 2x10 - Bilat wall slide Y with UE lift off/eccentric return  Yellow heavy band 3x5 Bilateral wall slides Y w/ UE lift off eccentric return  Yellow heavy band 3x5 -  -   ER/IR-- 90 deg Prone unilat  2# 2x10 ea - -  - Stdg w/red tubing @ 90/90 w/ scap stab A; 15x each Prone unilat  2# 2x15 ea   Standing dynamic stab w/tband perpendicular      2#; RTB; 15x each direction    Serratus Press - Standing unilat horiz add  Single red  2x10 ea standing press, upward, bilat  Blue tubing 2x15  Standing press, upward bilateral blue tubing 2x15 -  Supine bilat  5# 2x10   Row - - Standing, bilat  Blue tubing 2x15 Standing, bilateral blue tubing 2x15 -  Standing bilat  Blue 2x15   ER -- plane of scapula - - -  2# 2x15 ea  2# 2x10 ea   Overhead press - - -  -  -   Pull down - - -  -  -   ER/IR--neutral shoulder   Short arc motion  Single blue  2x10 ea Short arc motion single blue 2x10 ea Standing ER, IR  single blue 2x15 ea  -   Forward Elevation Standing full arc with manual resisted serratus 2x10 - ER/forward reach combo  2x10  IR/Forward reach combo  Blue 2x10 ER/forward reach combo 2x10  IR/forward reach combo blue 2x10 Landmine press stick+red tubing 2x10  -   Diagonal Ext - D1:   Single green 1x10 ea;  D2:  Single green   1x10 e - D1: blue band 15x  D2: blue band 15x Single blue tubing 1x20 ea  -   Diagonal Flex - - -  -  -   Thread the needle      5x B for post capsular stretching -   CKC UE Quadruped:  Respiratory belly lift with   B UE 3x 5 breath;    Quadruped opposite lifts  2x5 ea   Quadruped:  Respiratory belly lift with   B UE 1x 5 breath;    Quadruped to child's pose, quadruped to cobra pose x5 ea Quadruped opposite U/LE lift 3-5\" x5 ea Quadruped opposite UE/LE lift 3-5\" x5 each Quadruped opposite 2x5 ea;  Quadruped to downward dog 2x5 Quadruped alt UE/LE; 15x each;  Cat/cow moving into wil pose; 15x;  Puppy pose; 5x;  Down dog moving into plank; 15x  Modified plank (knees/forearms); 10 x 10sec -     HEP:No new HEP.      [email or web address] Portal  7/5/22: [id number]  10/13/22: [id number] Initial scapulo-cuff strrength    Treatment/Session Summary:    Treatment Assessment: Good effort wtih the exercises. Weak to the shoudler and whole upper quarter. 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  Communication/Consultation:  None today  Equipment provided today:  None  Recommendations/Intent for next treatment session: We will continue to progress R shoulder motion and re-strengthening.     Total Treatment Billable Duration:  35 minutes   Time In: 1640  Time Out: 1730    Agusto Garcia, PT       Charge Capture Post Session Pain  PT Visit Info  MedBridge Portal  MD Guidelines  Scanned Media  Benefits  MyChart    Future Appointments   Date Time Provider Department Center   1/30/2023  1:00 PM Agusto Garcia, PT [facility]   2/2/2023  1:00 PM Agusto Garcia, PT [facility]   2/6/2023  1:00 PM Carlos Lozano, PT [facility]   2/9/2023  1:00 PM Agusto Garcia, PT [facility]   2/13/2023  1:00 PM Agusto Garcia, PT [facility]   2/16/2023  1:00 PM Agusto Garcia, PT [facility]   2/21/2023  2:30 PM Mauri Mcghee Jr., MD [facility]   2/23/2023  4:00 PM Agusto Garcia, PT [facility]   2/27/2023  1:00 PM Agusto Garcia, PT [facility]   3/2/2023  1:00 PM Agusto Garcia, PT [facility]   3/20/2023  4:00 PM Agusto Garcia, PT [facility]   3/23/2023  4:00 PM Agusto Garcia, PT [facility]

## 2023-01-30 ENCOUNTER — HOSPITAL ENCOUNTER (OUTPATIENT)
Dept: PHYSICAL THERAPY | Age: 38
Setting detail: RECURRING SERIES
Discharge: HOME | End: 2023-02-02
Payer: COMMERCIAL

## 2023-01-30 PROCEDURE — 97110 THERAPEUTIC EXERCISES: CPT

## 2023-01-30 NOTE — PROGRESS NOTES
December 14, 2021       Young Finnegan MD  [address] [facility] [address]  Via In Basket      Patient: Tyrese Mckeon   YOB: 1979   Date of Visit: 12/14/2021       Dear Dr. Finnegan:    Thank you for referring Tyrese Mckeon to me for evaluation. Below are my notes for this visit with her.    If you have questions, please do not hesitate to call me. I look forward to following your patient along with you.      Sincerely,        Caren Zamora MD        CC: No Recipients  Caren Zamora MD  12/14/2021  5:50 PM  Signed  Chief Complaint   Patient presents with   • Office Visit   • Diabetes Mellitus     smbg-dexcom, bg now-91, labs due, ee due   .    43 y/o female with PMHx of microalbuminuria following up for management of DM. Last seen in  8/2021. FBG 91 per DEXCOM.  SMBG: Pt has DEXCOM. She checks sugars 3-4 times daily. Pump and CGM download reviewed. The pt denies polyuria or polydipsia. Pt does not have numbness and tingling in their feet. No abdominal complaints. No CP or SOB. The pt denies depression and anxiety. No blurry vision. No issues with the feet. She feels good overall. She reports some stomach pain 2 weeks ago, pain was intermittent. She did not go to ER. She reports bloating.       General: no fatigue, no fever, no weight loss, no appetite changes  Resp: no dyspnea, no cough  GI: no abdominal pain, no diarrhea, no nausea, no vomiting    Allergies  ALLERGIES:  No Known Allergies    Current Meds     Current Outpatient Medications   Medication Sig Dispense Refill   • HumaLOG 100 UNIT/ML injectable solution INJECT UP TO 70 UNITS A DAY VIA INSULIN PUMP 20 mL 3   • Vitamin D, Ergocalciferol, 1.25 mg (50,000 units) capsule 1  TAKE 1 CAPSULE BY MOUTH 1 DAY A WEEK 4 capsule 2   • neomycin-polymyxin-hydroCORTisone (CORTISPORIN) 3.5-33667-9 otic solution Place 3 drops into right ear 3 times daily. 10 mL 0   • Lancets (OneTouch Delica Plus Wcekaf30C) Misc USE 3 TIMES DAILY TO CHECK BLOOD SUGARS  Kimmy Santillan  : 1985  Primary: Robert [place]  Secondary:  [facility]  [address]  [facility] [place] [address]  Phone: [phone]  Fax: [phone] Plan Frequency: 2x/week for an additional 12 weeks    Plan of Care/Certification Expiration Date: 23      PT Visit Info: Total # of Visits to Date: 47  Progress Note Counter: 9  No Show: 1     Visit Count:  47   OUTPATIENT PHYSICAL THERAPY:OP NOTE TYPE: Treatment Note 2023       Episode  Appt Desk           Treatment Diagnosis:  Pain in Right Shoulder (M25.511)  Stiffness of Right Shoulder, Not elsewhere classified (M25.611)   Strain of muscle(s) and tendon(s) of the rotator cuff of right shoulder, sequela (S46.011S)  Medical/Referring Diagnosis: s/p  Arthroscopy of right shoulder, arthroscopic subacromial decompression, arthroscopic distal clavicle resection, extensive debridement of SLAP tear, biceps labral complex, glenohumeral joint, subacromial space, mini-open rotator cuff repair and biceps tenodesis. Strain of muscle(s) and tendon(s) of the rotator cuff of right shoulder, initial encounter [S46.011A]  Referring Physician:  Deepti Ugalde MD MD Orders:  eval & treat, home exercise program and Full Motion, Full Strength; 2x/week x6 weeks. (22)   Return MD Appt:  23  Date of Onset:  Onset Date: 22 (surgery; 22 injury)  Allergies: Duloxetine and Erythromycin  Restrictions/Precautions: Post op restrictions and precautions R shoulder. Interventions Planned (Treatment may consist of any combination of the following):    Current Treatment Recommendations: Strengthening; ROM; Manual Therapy - Soft Tissue Mobilization; Manual Therapy - Joint Manipulation; Pain management; Home exercise program; Modalities     Subjective Comments: Says her shoulder is fatigued more than sore with the work she has been doing over the weekend. Initial:  No VAS. Post Session:    No VAS.        Medications Last Reviewed: 300 each 2   • Alcohol Swabs (Easy Touch Alcohol Prep Medium) 70 % Pads USE 3 TIMES DAILY 300 each 2   • lisinopril (ZESTRIL) 2.5 MG tablet 1  TAKE 1 TABLET BY MOUTH DAILY 30 tablet 4   • Glucagon, rDNA, (Glucagon Emergency) 1 MG Kit Inject 1 mg into the muscle 1 time for 1 dose. 1 kit 11   • OneTouch Ultra test strip TEST BLOOD SUGAR 3 TIMES DAILY AS DIRECTED 300 each 3   • clobetasol (TEMOVATE) 0.05 % ointment APPLY TOPICALLY TWO TIMES A DAY TO AFFECTED AREA     • Continuous Blood Gluc  (Dexcom G6 ) Device 1 each continuous. 1 Device 0   • Continuous Blood Gluc Sensor (Dexcom G6 Sensor) Misc 1 each continuous. 1 each 11   • Continuous Blood Gluc Transmit (Dexcom G6 Transmitter) Misc 1 each continuous. 1 each 3   • Blood Glucose Monitoring Suppl (ONE TOUCH ULTRA 2) w/Device Kit Use daily to check blood sugars 1 kit 0     No current facility-administered medications for this visit.       Active Problems    Patient Active Problem List   Diagnosis   • Diabetes mellitus type 1, uncontrolled (CMS/[facility])   • Insulin pump status   • Microalbuminuria   • Dyslipidemia   • Hair loss   • Iron deficiency anemia   • Hypocalcemia   • Vitamin D deficiency   • Hypoglycemia associated with diabetes (CMS/[facility])       Past Medical History  Diabetes mellitus type 1, uncontrolled (E10.65)     Social History  Denied: History of Alcohol Use (History)  Denied: History of Drug Use    Never a smoker  Never smoker    Review  Past medical history, problem list, family medical history, surgical history and social history reviewed.      Vitals  Visit Vitals  /68   Pulse 99   Wt 72.6 kg (159 lb 15.1 oz)   LMP 10/07/2021 (Approximate)   SpO2 98%   BMI 24.32 kg/m²       Physical Exam  General: No apparent distress, well appearing,   HEENT: EOMI, no proptosis, thyroid palpable at 25 grams, no nodules palpable  Chest: CTA, no crackles or rhonchi   CVS: S1 S2, no murmur   Abd: Soft, non tender   Ext: No edema   CNS:  Naproxen; gabapen 1/30/2023  Updated Objective Findings:    No new measure. Treatment   THERAPEUTIC EXERCISE: (40 minutes): Exercises for shoulder girdle strength, stabilization, and active motion done as per grid. Exercises as indicated. Verbal, visual, and manual guiding cues for corrected scapulohumeral mechanics with these. Date  1/4/22 Date  1/9/23 Date  1/11/23 Date:  1/17/23 Date  1/19/23 Date:  1/23/23 Date  1/25/23 Date  1/30/23   Activity/Exercise    Parameters       UBE - UBE L 2.5 x6' L. 2 x8' L2 x8' L. 2 x8' L3 8min; R UE only; alt forw/back L. 3 x8' on own -   ROM/Flexibility Assisted as above Assisted as above   Assisted as above Assisted as above Active wall slide 4 ways x15 ea;   Active mobility as above  As above  -   Rhythmic Stabilization: - - Standing ball drops at 90-deg scaption light ball 2x30 rep ea Standing ball drops at 90-degrees scaption light ball 2x30  Standing ball drops at 90-degrees scaption, flexion, abduciton with light ball x30 ea  - -   Strength:   Shoulder ext Prone bilat  2# 2x10 - Standing bilat  Blue tubing  2x15 Standing bilateral blue tubing 2x15 - Standing B w/palms forw and back w/ new green tubing; 20x Standing bilateral blue tubing 2x15 Prone bilat on table top, short arc  1# 2x10   Horiz abd/Middle Trap Prone bilat  2# 2x10 Standing unilat horiz abd  Single red 2X10 ea -  Standing Horiz abdunilat single blue tubing x20 Standing horiz abd w/ new green tubing; 20x - Prone bilat on table top, short arc  1# 2x10   Scaption/Lower Trap Prone bilat  1# 2x10 - Bilat wall slide Y with UE lift off/eccentric return  Yellow heavy band 3x5 Bilateral wall slides Y w/ UE lift off eccentric return  Yellow heavy band 3x5 -  - Prone bilat on table top, short arc  1# 2x10   ER/IR-- 90 deg Prone unilat  2# 2x10 ea - -  - Stdg w/red tubing @ 90/90 w/ scap stab A; 15x each Prone unilat  2# 2x15 ea Prone bilat  1# 2x15   Standing dynamic stab w/tband perpendicular      2#; RTB; 15x Conscious, alert, oriented, speech intact  Skin: No rash    Assessment    Patient Active Problem List    Diagnosis Date Noted   • Hypoglycemia associated with diabetes (CMS/[facility]) 08/09/2021     Priority: Low   • Iron deficiency anemia 01/04/2021     Priority: Low   • Hypocalcemia 01/04/2021     Priority: Low   • Vitamin D deficiency 01/04/2021     Priority: Low   • Hair loss 09/25/2020     Priority: Low   • Dyslipidemia 06/20/2019     Priority: Low   • Insulin pump status 04/05/2017     Priority: Low   • Diabetes mellitus type 1, uncontrolled (CMS/formerly [facility]) 04/22/2013     Priority: Low   • Microalbuminuria 04/22/2013     Priority: Low       Discussion/Summary  DM Impression:   43 y/o female with PMHx of microalbuminuria following up for management of DM.      DM type 1, uncontrolled  Lab Results   Component Value Date    HGBA1C 8.8 (H) 08/09/2021    HGBA1C 9.0 (H) 04/21/2021    TSH 0.784 04/22/2021    MALBCR 7.6 12/14/2019   - Creatinine 0.8 in 5/2021  - GFR >60 in 5/2021  - Pt is currently on 670G  insulin pump.She records carbs more than before. Bolusing at least 2 times for meals per day. reinforced bolusing for night time. Pt sometimes waits hours between switching pump sites. BGs rise to around 400s at this time. Pt infomred that she is at risk for DKA. Pt informed to use insulin with a syringe.   - Pt has DEXCOM. DEXCOM alarms at 80.   - Pt has IUD with no plans for pregnancy  - Continue current pump settings of Basal rates: 0.600 U/hr at 12am, 0.950 U/hr at 6am, 1.45 U/hr at 10am, 1.75 U/hr at 12pm, 1.70 U/hr at 4pm, and 1.30 U/hr at 9pm. Carb ratios: 6.5 at 12am. Sensitivity: 50 at 12am and 30 at 6am. Target .  - Rotate pump site every 3 days,   - Continue healthy diet and lifestyle modifications  - Advised pt to f/u with podiatry for bunions  - Recheck A1c    CGM download reviewed from 12/1/2021-12/14/2021 : Average blood glucose 217 . Coefficient of variation 44%. Auto mode 0%. Manual mode 100%. Sensor  each direction  -   Serratus Press - Standing unilat horiz add  Single red  2x10 ea standing press, upward, bilat  Blue tubing 2x15  Standing press, upward bilateral blue tubing 2x15 -  Supine bilat  5# 2x10 Supine bilat  5# 2x10, 1x5   Row - - Standing, bilat  Blue tubing 2x15 Standing, bilateral blue tubing 2x15 -  Standing bilat  Blue 2x15 Bent over  5# 2x15   ER -- plane of scapula - - -  2# 2x15 ea  2# 2x10 ea 2# 3x10 ea   Overhead press - - -  -  - Landmine press stick+green tubing  2x10   Pull down - - -  -  - -   ER/IR--neutral shoulder   Short arc motion  Single blue  2x10 ea Short arc motion single blue 2x10 ea Standing ER, IR  single blue 2x15 ea  - -   Forward Elevation Standing full arc with manual resisted serratus 2x10 - ER/forward reach combo  2x10  IR/Forward reach combo  Blue 2x10 ER/forward reach combo 2x10  IR/forward reach combo blue 2x10 Landmine press stick+red tubing 2x10  - -   Diagonal Ext - D1:   Single green 1x10 ea;  D2:  Single green   1x10 e - D1: blue band 15x  D2: blue band 15x Single blue tubing 1x20 ea  - -   Diagonal Flex - - -  -  - -   Thread the needle      5x B for post capsular stretching - Quadruped rotation under/over  2x5 ea   CKC UE Quadruped:  Respiratory belly lift with   B UE 3x 5 breath;    Quadruped opposite lifts  2x5 ea   Quadruped:  Respiratory belly lift with   B UE 1x 5 breath;    Quadruped to child's pose, quadruped to cobra pose x5 ea Quadruped opposite U/LE lift 3-5\" x5 ea Quadruped opposite UE/LE lift 3-5\" x5 each Quadruped opposite 2x5 ea;  Quadruped to downward dog 2x5 Quadruped alt UE/LE; 15x each;  Cat/cow moving into wil pose; 15x;  Puppy pose; 5x;  Down dog moving into plank; 15x  Modified plank (knees/forearms); 10 x 10sec - Quadruped:  Respiratory belly lift with   B UE, R UE, L UE   1x5 breath each;    Quadruped to down dog 1x10     HEP: Provided ice for shoulder at end of treatment. No new HEP.      MedBridge Portal  7/5/22: [id number]  10/13/22: [id number] Initial scapulo-cuff strrength    Treatment/Session Summary:    Treatment Assessment: Good effort and perforamnce of her exercises done. She has good active and passive ROM. R shoulder and cuff strength is weak, but continues to progress. Pain and fatigue with working the arm. Communication/Consultation:  None today  Equipment provided today:  None  Recommendations/Intent for next treatment session: We will continue to progress R shoulder motion and re-strengthening.      Total Treatment Billable Duration:  40 minutes   Time In: 5316  Time Out: 8562  [facility]       Charge Capture Post Session Pain  PT Visit Info  MedBridge Portal  MD Guidelines  Scanned Media  Benefits  MyChart    Future Appointments   Date Time Provider Nader Jameson   2/2/2023  1:00 PM Vernel Calon, PT [facility]   2/6/2023  1:00 PM Carlos Lozano, PT [facility]   2/9/2023  1:00 PM Vernel Caldron, PT [facility]   2/13/2023  1:00 PM Vernel Caldron, PT [facility]   2/16/2023  1:00 PM Vernel Caldron, PT [facility]   2/21/2023  2:30 PM Elza Castanon MD [facility]   2/23/2023  4:00 PM Vernel Caldron, PT [facility]   2/27/2023  1:00 PM Vernel Caldron, PT [facility]   3/2/2023  1:00 PM Vernel Caldron, PT [facility]   3/20/2023  4:00 PM Vernel Caldron, PT [facility]   3/23/2023  4:00 PM Vernel Caldron, PT [facility] wear 0%. 52.4% of sugars were in the target range. 47.3% of sugars were above 180. 25.8% of sugars were above 250. 0.3% of sugars were below 70. 0% of sugars were below 54.     Pump download reviewed from 12/1/2021-12/14/2021: Total daily dose  48.5U. Average daily bolus  20.8U and average daily basal  27.7U. Basal rates: 0.600 U/hr at 12am, 0.950 U/hr at 6am, 1.45 U/hr at 10am, 1.75 U/hr at 12pm, 1.70 U/hr at 4pm, and 1.30 U/hr at 9pm. Carb ratios: 6 at 12am. Sensitivity: 50 at 12am and 30 at 6am. Target . Active insulin time 4 hours. At night BG Avg of 180. Bgs spike after eating to 200-250s. During the day, Bgs elevated. No frequent hypoglycemia. She rotates pumps sites regularly. She records carbs more than before. Bolusing at least 2 times for meals per day. reinforince bolusing for night time       Hx of Hypoglycemia   - No recent frequent hypoglycemia  - Hypoglycemia was in 5/2021 and pt was hospitalized. She was given glucagon for emergencies  - Pt has glucagon. Pt instructed to call 911 first and then administer glucagon when having a low BG.      Hx of Microalbuminuria  - /68 , controlled  - Continue Lisinopril 2.5 mg daily   Lab Results   Component Value Date    MALBCR 7.6 12/14/2019   - Potassium 4.0 in 5/2021  - Discussed need to avoid pregnancy while on lisinopril.   - Recheck urine mac    DLD associated with DM type 1  Lab Results   Component Value Date    CHOLESTEROL 194 12/30/2020    TRIGLYCERIDE 80 12/30/2020    HDL 83 12/30/2020    CALCLDL 95 12/30/2020   - AST 16, ALT 13, ALK Phos 88 in 5/2021  - Continue low fat, low cholesterol diet    Iron deficiency anemia with hair loss  - Pt no longer on iron over-the-counter supplements  - Mgmt per Dr. Finnegan    Hx of hypocalcemia  - Calcium 8.3 and albumin 3.6 in 5/2021, previously 9.0 in 4/2021  - Pt not taking calcium supplements. Advised pt to get calcium+ vitamin D supplement  - Continue with dietary intake of 4 servings of  calcium-containing foods daily    Vitamin D deficiency  Lab Results   Component Value Date    VITD25 16.8 (L) 04/22/2021   - Continue Vitamin D 50,000 IU weekly    Intermittent Stomach Pain  - She reports some stomach pain 2 weeks ago, pain was intermittent. She did not go to ER.    - Advised Pt to address with PCP, Pt has had this in the past  - Will refer to GI specialist    I would like to thank you for allowing me to participate in the care of this patient. Please call if you have any questions.       Plan  Management Plan and Instructions:        Limit the amount of sugar sweetened drinks like soda pop and juice. , For Blood Glucose < 80 mg/dl treat with 4 ounces of juice or 4 glucose tablets (15g). Recheck Blood Glucose in 15 minutes. Repeat until blood glucose is > 80. , Call if sugars consistently less than 70 or greater than 300.   and Bring your sugar log and meter to each visit    - Schedule appointment with podiatrists for bunions   - Schedule appointment with GI specialist for stomach pain  - Eat at least 4 servings of calcium-containing foods daily like milk, almond milk, low-fat cheese, yogurt, and spinach  - Rotate pump site every 3 days  - Continue healthy diet and lifestyle modifications  - Complete blood work as per orders at an ACL lab, schedule an appointment for blood work  - Follow up in 3-4 months     Scribe Attestation   On 12/14/2021, Sulma LUCIANO scribed the services personally performed by Caren Zamora MD MD Attestation   The documentation recorded by the scribe accurately and completely reflects the service(s) I personally performed and the decisions made by me.

## 2023-02-02 ENCOUNTER — HOSPITAL ENCOUNTER (OUTPATIENT)
Dept: PHYSICAL THERAPY | Age: 38
Setting detail: RECURRING SERIES
Discharge: HOME | End: 2023-02-05
Payer: COMMERCIAL

## 2023-02-02 PROCEDURE — 97110 THERAPEUTIC EXERCISES: CPT

## 2023-02-02 ASSESSMENT — PAIN SCALES - GENERAL: PAINLEVEL_OUTOF10: 3

## 2023-02-02 NOTE — PROGRESS NOTES
Rebeka Jordyn  : 1985  Primary: Christianne Allison  Secondary:  [employer]  [address]  [facility] [place] [address]  Phone: [phone]  Fax: [phone] Plan Frequency: 2x/week for an additional 12 weeks    Plan of Care/Certification Expiration Date: 23      PT Visit Info: Total # of Visits to Date: 52      OUTPATIENT PHYSICAL THERAPY:OP NOTE TYPE: Progress Report 2023               Episode  Appt Desk         Treatment Diagnosis:  Pain in Right Shoulder (M25.511)  Stiffness of Right Shoulder, Not elsewhere classified (M25.611)   Strain of muscle(s) and tendon(s) of the rotator cuff of right shoulder, sequela (S46.011S)  Medical/Referring Diagnosis: s/p  Arthroscopy of right shoulder, arthroscopic subacromial decompression, arthroscopic distal clavicle resection, extensive debridement of SLAP tear, biceps labral complex, glenohumeral joint, subacromial space, mini-open rotator cuff repair and biceps tenodesis. Strain of muscle(s) and tendon(s) of the rotator cuff of right shoulder, initial encounter [S46.011A]  Referring Physician:  Carlee Abbott MD MD Orders: eval & treat, home exercise program and Full Motion, Full Strength; 2x/week x6 weeks.  (22)    Return MD Appt:  23  Date of Onset:  Onset Date: 22 (surgery; 22 injury)      OBJECTIVE   ROM:  PROM RUE (degrees)  R Shoulder Flex  (0-180): 180 (forward elevation)  R Shoulder ABduction (0-180): 100 (with scapula stabilized)  R Shoulder Int Rotation  (0-70): 75 (stabilized at 45 abd, 65 at 90 abd)  R Shoulder Ext Rotation  (0-90): 80 (in shoulder neutral, 90 at 45 and 90 deg abd)  AROM RUE (degrees)  R Shoulder Flexion (0-180): 180 (forward elevation)  R Shoulder Int Rotation  (0-70): 50 (at 90 deg abd; to T7 behind back.)  R Shoulder Ext Rotation (0-90): 75 (by side, 90 at 90 deg abd)  Strength:       R Shoulder: Strong and painless to all with Soft Tissue Differential testing; 5 to all with manual testing. Special Test: Empty can: negative. Horiz Adduction test: positive pain, decreased motion on R.   ASSESSMENT   Progress Assessment: Ms. Lg Frederick is now 7.5 months s/p arthroscopy of right shoulder with arthroscopic subacromial decompression, arthroscopic distal clavicle resection, extensive debridement of a type 2 SLAP tear, biceps labral complex, glenohumeral joint, subacromial space, mini-open rotator cuff repair for a 3 cm full-thickness subscapularis and infraspinatus rotator cuff tear, and biceps tenodesis on 6/17/22. She continues to make steady progress. She continues to have pain to the shoulder, especially with increased active use. She has excellent shoulder ROM. Strength is good with manual testing, but there is functional weakness to the shoulder and whole UE. She is progressing toward her discharge goals. We will plan to continue with her existing treatment plan with progression of advanced HEP for discharge prep. Problem List: (Impacting functional limitations): Body Structures, Functions, Activity Limitations Requiring Skilled Therapeutic Intervention: Decreased ROM; Decreased tolerance to work activity; Increased pain        Therapy Prognosis:   Therapy Prognosis: Good       PLAN   Effective Dates: 1/4/23 TO Plan of Care/Certification Expiration Date: 02/18/23      Frequency/Duration: Plan Frequency: 2x/week for an additional 6 weeks    Interventions Planned (Treatment may consist of any combination of the following):    Current Treatment Recommendations: ROM; Manual Therapy - Soft Tissue Mobilization; Manual Therapy - Joint Manipulation; Pain management; Home exercise program; Modalities; progressing to Strengthening when appropriate. Goals: (Goals have been discussed and agreed upon with patient.)  Short-Term Functional Goals:   Report no more than 5/10 intermittent pain to R shoulder with compensatory use during basic functional activities, and score less than 50% on the DASH. Met 9/29/22  R shoulder PROM forward elevation greater than 135 degrees and external rotation greater than 60 degrees to progress into functional ranges. Met 9/2/22  Demonstrate good R shoulder isometric strength with manual testing to progress into strength phase. Met 8/1/22  Independent with initial HEP. Met 8/1/22  Discharge Goals: Time Frame:6 additional weeks  No more than 3/10 intermittent pain R shoulder with return to normalized household and work-related activities, and score less than 30% on the DASH. ongoing 2/2/23  R shoulder AROM forward elevation greater than 135 degrees, external rotation greater than 60 degrees, and strength to shoulder are grossly WNL's for safe use with normalized activities. Met 10/24/22  Demonstrate good functional shoulder strength and endurance for return to normalized household and work activities. Progressed, ongoing 2/2/23   Independent with advanced shoulder HEP for continued self-management. Progressed, ongoing 2/2/23       Outcome Measure: Tool Used: Disabilities of the Arm, Shoulder and Hand (DASH) Questionnaire - Quick Version  Score  Initial: 44/55 or 75% disability 29/55 or 41% disability (9/29/22) Most Recent: 37/55 or 59% disability (1/4/23)   Interpretation of Score: The DASH is designed to measure the activities of daily living in person's with upper extremity dysfunction or pain. Each section is scored on a 1-5 scale, 5 representing the greatest disability. The scores of each section are added together for a total score of 55. Medical Necessity:   She is 7.5 months post op R shoulder. Impairments listed above are limiting comfort and basic functional use her R/dominant UE. She is progressing. Reason For Services/Other Comments:  Patient continues to require skilled intervention due to the complexity fo the surgical procedure and need for safe, progressive rehab to return to functional use of her R/dominant UE.    Co-morbid cervical pain, strain and L shoulder pain and cuff pathology and history of CRPS to B LE will mostly likely make progress slower.      Jacques Lambert, PT, MSPT, OCS       Post Session Pain  Charge Capture  PT Visit Info  POC Link  Treatment Note Link  MD Guidelines  Gretchenharabiel

## 2023-02-02 NOTE — PROGRESS NOTES
Hien Markham  : 1985  Primary: Shwetha Ponce [place]  Secondary:  [facility]  [address] Jason [address]ne [address] [place], [place] [address] [address]  Phone: [phone]  Fax: [phone] Plan Frequency: 2x/week for an additional 12 weeks    Plan of Care/Certification Expiration Date: 23      PT Visit Info: Total # of Visits to Date: 54  Progress Note Counter: 10  No Show: 1     Visit Count:  54   OUTPATIENT PHYSICAL THERAPY:OP NOTE TYPE: Treatment Note 2023       Episode  Appt Desk           Treatment Diagnosis:  Pain in Right Shoulder (M25.511)  Stiffness of Right Shoulder, Not elsewhere classified (M25.611)   Strain of muscle(s) and tendon(s) of the rotator cuff of right shoulder, sequela (S46.011S)  Medical/Referring Diagnosis: s/p  Arthroscopy of right shoulder, arthroscopic subacromial decompression, arthroscopic distal clavicle resection, extensive debridement of SLAP tear, biceps labral complex, glenohumeral joint, subacromial space, mini-open rotator cuff repair and biceps tenodesis. Strain of muscle(s) and tendon(s) of the rotator cuff of right shoulder, initial encounter [S46.011A]  Referring Physician:  Nadine rEickson MD MD Orders:  eval & treat, home exercise program and Full Motion, Full Strength; 2x/week x6 weeks. (22)   Return MD Appt:  23  Date of Onset:  Onset Date: 22 (surgery; 22 injury)  Allergies: Duloxetine and Erythromycin  Restrictions/Precautions: Post op restrictions and precautions R shoulder. Interventions Planned (Treatment may consist of any combination of the following):    Current Treatment Recommendations: Strengthening; ROM; Manual Therapy - Soft Tissue Mobilization; Manual Therapy - Joint Manipulation; Pain management; Home exercise program; Modalities     Subjective Comments: Says her shoulder is \"recovery this week\" from all the work load she did last week. Initial:     3/10    Post Session:    No VAS.        Medications Last Reviewed: Naproxen; gabapen 2/2/2023  Updated Objective Findings:    See Progress Report. Treatment   THERAPEUTIC EXERCISE: (40 minutes): Exercises for shoulder motion with Assisted Active Isolated Stretches in supine to ER at 30, 45, and 90 degrees abduction, IR stabilized at 45 and 90 degrees abduction, ER and IR at 90 degrees flexion, abduction, forward elevation, horizontal abduction/adduction, D1 and D2 flexion--3\"x10 each; in standing to ER at 30 and 90 degrees abduction, IR at 90 degrees abduction, forward elevation, and extension/IR/adduction behind the back--3\"x10 each. Exercises for shoulder girdle strength, stabilization, and active motion done as per grid. Exercises as indicated. Verbal, visual, and manual guiding cues for corrected scapulohumeral mechanics with these. Date  1/4/22 Date  1/9/23 Date  1/11/23 Date:  1/17/23 Date  1/19/23 Date:  1/23/23 Date  1/25/23 Date  1/30/23 Date  2/2/23   Activity/Exercise    Parameters        UBE - UBE L 2.5 x6' L. 2 x8' L2 x8' L. 2 x8' L3 8min; R UE only; alt forw/back L. 3 x8' on own - -   ROM/Flexibility Assisted as above Assisted as above   Assisted as above Assisted as above Active wall slide 4 ways x15 ea;   Active mobility as above  As above  - Assisted as above   Rhythmic Stabilization: - - Standing ball drops at 90-deg scaption light ball 2x30 rep ea Standing ball drops at 90-degrees scaption light ball 2x30  Standing ball drops at 90-degrees scaption, flexion, abduciton with light ball x30 ea  - - -   Strength:   Shoulder ext Prone bilat  2# 2x10 - Standing bilat  Blue tubing  2x15 Standing bilateral blue tubing 2x15 - Standing B w/palms forw and back w/ new green tubing; 20x Standing bilateral blue tubing 2x15 Prone bilat on table top, short arc  1# 2x10 Prone bilat on table top, short arc  1# 2x10   Horiz abd/Middle Trap Prone bilat  2# 2x10 Standing unilat horiz abd  Single red 2X10 ea -  Standing Horiz abdunilat single blue tubing x20 Standing horiz abd w/ new green tubing; 20x - Prone bilat on table top, short arc  1# 2x10 Prone bilat on table top, short arc  1# 2x10   Scaption/Lower Trap Prone bilat  1# 2x10 - Bilat wall slide Y with UE lift off/eccentric return  Yellow heavy band 3x5 Bilateral wall slides Y w/ UE lift off eccentric return  Yellow heavy band 3x5 -  - Prone bilat on table top, short arc  1# 2x10 Prone bilat on table top, short arc  1# 2x10   ER/IR-- 90 deg Prone unilat  2# 2x10 ea - -  - Stdg w/red tubing @ 90/90 w/ scap stab A; 15x each Prone unilat  2# 2x15 ea Prone bilat  1# 2x15 -   Standing dynamic stab w/tband perpendicular      2#; RTB; 15x each direction  - -   Serratus Press - Standing unilat horiz add  Single red  2x10 ea standing press, upward, bilat  Blue tubing 2x15  Standing press, upward bilateral blue tubing 2x15 -  Supine bilat  5# 2x10 Supine bilat  5# 2x10, 1x5 -   Row - - Standing, bilat  Blue tubing 2x15 Standing, bilateral blue tubing 2x15 -  Standing bilat  Blue 2x15 Bent over  5# 2x15 -   ER -- plane of scapula - - -  2# 2x15 ea  2# 2x10 ea 2# 3x10 ea 2# 3x10 ea   Overhead press - - -  -  - Landmine press stick+green tubing  2x10 Landmine press stick+green tubing  3x10   Pull down - - -  -  - - -   ER/IR--neutral shoulder   Short arc motion  Single blue  2x10 ea Short arc motion single blue 2x10 ea Standing ER, IR  single blue 2x15 ea  - - -   Forward Elevation Standing full arc with manual resisted serratus 2x10 - ER/forward reach combo  2x10  IR/Forward reach combo  Blue 2x10 ER/forward reach combo 2x10  IR/forward reach combo blue 2x10 Landmine press stick+red tubing 2x10  - - -   Diagonal Ext - D1:   Single green 1x10 ea;  D2:  Single green   1x10 e - D1: blue band 15x  D2: blue band 15x Single blue tubing 1x20 ea  - - -   Diagonal Flex - - -  -  - - -   Thread the needle      5x B for post capsular stretching - Quadruped rotation under/over  2x5 ea -   CKC UE Quadruped:  Respiratory belly lift with   B UE 3x 5 breath;    Quadruped opposite lifts  2x5 ea   Quadruped:  Respiratory belly lift with   B UE 1x 5 breath;    Quadruped to child's pose, quadruped to cobra pose x5 ea Quadruped opposite U/LE lift 3-5\" x5 ea Quadruped opposite UE/LE lift 3-5\" x5 each Quadruped opposite 2x5 ea;  Quadruped to downward dog 2x5 Quadruped alt UE/LE; 15x each;  Cat/cow moving into wil pose; 15x;  Puppy pose; 5x;  Down dog moving into plank; 15x  Modified plank (knees/forearms); 10 x 10sec - Quadruped:  Respiratory belly lift with   B UE, R UE, L UE   1x5 breath each;    Quadruped to down dog 1x10 -     HEP: Provided ice for shoulder at end of treatment. No new HEP. [employer] [facility]  7/5/22: [id number]  10/13/22: [id number] Initial scapulo-cuff strrength    Treatment/Session Summary:    Treatment Assessment: She is now 7.5 months post op R shoulder. She has excellent shoulder ROM. Strength is now good with manual testing. There is functional weakness to the shoulder and whole UE. This continues to progress. Good perforamance of the exercises today. Communication/Consultation:  None today  Equipment provided today:  None  Recommendations/Intent for next treatment session: We will continue to progress R shoulder motion and re-strengthening.      Total Treatment Billable Duration:  40 minutes   Time In: 1310  Time Out: [address], PT       Charge Capture Post Session Pain  PT Visit Info  [employer] [facility]  MD [address]    Future Appointments   Date Time Provider Ndaer Jameson   2/6/2023  1:00 PM Carlos Lozano, PT [facility]   2/9/2023  1:00 PM Yakov Camacho, PT [facility]   2/13/2023  1:00 PM Yakov Camacho, PT [facility]   2/16/2023  1:00 PM Yakov Camacho, PT [facility]   2/21/2023  2:30 PM Ailin Little MD [facility]   2/23/2023  4:00 PM Yakov Camacho, PT [facility]   2/27/2023  1:00 [facility]   3/2/2023  1:00 PM Yakov Camacho, PT [facility]   3/20/2023  4:00 PM Zoanne Corning [place], PT [facility]   3/23/2023  4:00 PM Adrianne Julio, PT [facility]

## 2023-02-06 ENCOUNTER — HOSPITAL ENCOUNTER (OUTPATIENT)
Dept: PHYSICAL THERAPY | Age: 38
Setting detail: RECURRING SERIES
Discharge: HOME | End: 2023-02-09
Payer: COMMERCIAL

## 2023-02-06 PROCEDURE — 97110 THERAPEUTIC EXERCISES: CPT

## 2023-02-06 ASSESSMENT — PAIN SCALES - GENERAL: PAINLEVEL_OUTOF10: 3

## 2023-02-06 NOTE — PROGRESS NOTES
Rebeka Jordyn  : 1985  Primary: Christianne Allison  Secondary:  [facility]  [address]  [facility] [place] [address]  Phone: [phone]  Fax: [phone] Plan Frequency: 2x/week for an additional 12 weeks    Plan of Care/Certification Expiration Date: 23      PT Visit Info: Total # of Visits to Date: 64  Progress Note Counter: 10  No Show: 1     Visit Count:  64   OUTPATIENT PHYSICAL THERAPY:OP NOTE TYPE: Treatment Note 2023       Episode  Appt Desk           Treatment Diagnosis:  Pain in Right Shoulder (M25.511)  Stiffness of Right Shoulder, Not elsewhere classified (M25.611)   Strain of muscle(s) and tendon(s) of the rotator cuff of right shoulder, sequela (S46.011S)  Medical/Referring Diagnosis: s/p  Arthroscopy of right shoulder, arthroscopic subacromial decompression, arthroscopic distal clavicle resection, extensive debridement of SLAP tear, biceps labral complex, glenohumeral joint, subacromial space, mini-open rotator cuff repair and biceps tenodesis. Strain of muscle(s) and tendon(s) of the rotator cuff of right shoulder, initial encounter [S46.011A]  Referring Physician:  Carlee Abbott MD MD Orders:  eval & treat, home exercise program and Full Motion, Full Strength; 2x/week x6 weeks. (22)   Return MD Appt:  23  Date of Onset:  Onset Date: 22 (surgery; 22 injury)  Allergies: Duloxetine and Erythromycin  Restrictions/Precautions: Post op restrictions and precautions R shoulder.    Interventions Planned (Treatment may consist of any combination of the following):    Current Treatment Recommendations: Strengthening; ROM; Manual Therapy - Soft Tissue Mobilization; Manual Therapy - Joint Manipulation; Pain management; Home exercise program; Modalities     Subjective Comments: Patient with complaints of shoudler soreness today  Initial:     3/10    Post Session:       3   Medications Last Reviewed: Naproxen; gabapen 2023  Updated Objective Findings: See Progress Report. Treatment   THERAPEUTIC EXERCISE: (55 minutes):    Exercises for shoulder girdle strength, stabilization, and active motion done as per grid. Exercises as indicated. Verbal, visual, and manual guiding cues for corrected scapulohumeral mechanics with these. Date  1/9/23 Date  1/11/23 Date:  1/17/23 Date  1/19/23 Date:  1/23/23 Date  1/25/23 Date  1/30/23 Date  2/2/23 Date:  2/6/23   Activity/Exercise   Parameters         UBE UBE L 2.5 x6' L. 2 x8' L2 x8' L. 2 x8' L3 8min; R UE only; alt forw/back L. 3 x8' on own - - L2 x 8' 1/1   ROM/Flexibility Assisted as above   Assisted as above Assisted as above Active wall slide 4 ways x15 ea;   Active mobility as above  As above  - Assisted as above CAM's in child pose D2 flex to hand behind back and return 2 x 5   Rhythmic Stabilization: - Standing ball drops at 90-deg scaption light ball 2x30 rep ea Standing ball drops at 90-degrees scaption light ball 2x30  Standing ball drops at 90-degrees scaption, flexion, abduciton with light ball x30 ea  - - - Flex at 90 and 110    ER/IR 2 x 10x   Strength:   Shoulder ext - Standing bilat  Blue tubing  2x15 Standing bilateral blue tubing 2x15 - Standing B w/palms forw and back w/ new green tubing; 20x Standing bilateral blue tubing 2x15 Prone bilat on table top, short arc  1# 2x10 Prone bilat on table top, short arc  1# 2x10 Prone bilat on table top, short arc  1# 2x10   Horiz abd/Middle Trap Standing unilat horiz abd  Single red 2X10 ea -  Standing Horiz abdunilat single blue tubing x20 Standing horiz abd w/ new green tubing; 20x - Prone bilat on table top, short arc  1# 2x10 Prone bilat on table top, short arc  1# 2x10 Prone bilat on table top, short arc  1# 2x10   Scaption/Lower Trap - Bilat wall slide Y with UE lift off/eccentric return  Yellow heavy band 3x5 Bilateral wall slides Y w/ UE lift off eccentric return  Yellow heavy band 3x5 -  - Prone bilat on table top, short arc  1# 2x10 Prone bilat on table top, short arc  1# 2x10 Prone bilat on table top, short arc  1# 2x10   ER/IR-- 90 deg - -  - Stdg w/red tubing @ 90/90 w/ scap stab A; 15x each Prone unilat  2# 2x15 ea Prone bilat  1# 2x15 -    Standing dynamic stab w/tband perpendicular     2#; RTB; 15x each direction  - -    Serratus Press Standing unilat horiz add  Single red  2x10 ea standing press, upward, bilat  Blue tubing 2x15  Standing press, upward bilateral blue tubing 2x15 -  Supine bilat  5# 2x10 Supine bilat  5# 2x10, 1x5 - Supine bilat  5# 2x10,    Row - Standing, bilat  Blue tubing 2x15 Standing, bilateral blue tubing 2x15 -  Standing bilat  Blue 2x15 Bent over  5# 2x15 -    ER -- plane of scapula - -  2# 2x15 ea  2# 2x10 ea 2# 3x10 ea 2# 3x10 ea Sidelying 1# 2 x 10   Overhead press - -  -  - Landmine press stick+green tubing  2x10 Landmine press stick+green tubing  3x10 Landmine press stick+red tubing  3x10   Pull down - -  -  - - -    ER/IR--neutral shoulder  Short arc motion  Single blue  2x10 ea Short arc motion single blue 2x10 ea Standing ER, IR  single blue 2x15 ea  - - - Standing with LTB 2 x 10 bilat   Forward Elevation - ER/forward reach combo  2x10  IR/Forward reach combo  Blue 2x10 ER/forward reach combo 2x10  IR/forward reach combo blue 2x10 Landmine press stick+red tubing 2x10  - - - Standing at wall for postural cues D2 flex 10 OTB   Diagonal Ext D1:   Single green 1x10 ea;  D2:  Single green   1x10 e - D1: blue band 15x  D2: blue band 15x Single blue tubing 1x20 ea  - - -    Diagonal Flex - -  -  - - -    Thread the needle     5x B for post capsular stretching - Quadruped rotation under/over  2x5 ea -    CKC UE Quadruped:  Respiratory belly lift with   B UE 1x 5 breath;    Quadruped to child's pose, quadruped to cobra pose x5 ea Quadruped opposite U/LE lift 3-5\" x5 ea Quadruped opposite UE/LE lift 3-5\" x5 each Quadruped opposite 2x5 ea;  Quadruped to downward dog 2x5 Quadruped alt UE/LE; 15x each;  Cat/cow moving into wil pose; 15x;  Puppy pose; 5x;  Down dog moving into plank; 15x  Modified plank (knees/forearms); 10 x 10sec - Quadruped:  Respiratory belly lift with   B UE, R UE, L UE   1x5 breath each;    Quadruped to down dog 1x10 - -Single arm L raise in qped 10x  -Alt leg ext 10x  -Table planks with extended arms 2 x 10 secs  -qped into child pose stretch 10x   Vasopnuematic compression (15 minutes ) for analgesia no charge  HEP: No new HEP. [employer] [facility]  7/5/22: [id number]  10/13/22: [id number] Initial scapulo-cuff strrength    Treatment/Session Summary:    Treatment Assessment: Tolerated treatment well. Required repeated cues to correct posture and encourage scapular setting  Communication/Consultation:  None today  Equipment provided today:  None  Recommendations/Intent for next treatment session: We will continue to progress R shoulder motion and re-strengthening.      Total Treatment Billable Duration:  55 minutes   Time In: 1320  Time Out: 1440    Carlos Lozano PT       Charge Capture Post Session Pain  PT Visit Info  [employer] [facility]  MD Guidelines  Scanned Media  Benefits  MyChart    Future Appointments   Date Time Provider Nader Jameson   2/9/2023  1:00 PM [address] [place] [address], PT [facility]   2/13/2023  1:00 PM [address] [place] [address], PT [facility]   2/16/2023  1:00 PM [address] [place] [address], PT [facility]   2/21/2023  2:30 PM Ann Ashby MD [facility]   2/23/2023  4:00  [facility], PT [facility]   2/27/2023  1:00 PM [address] [place] [address], PT [facility]   3/2/2023  1:00  [facility]   3/20/2023  4:00  [facility], PT [facility]   3/23/2023  4:00 PM [address] [place] [address], [facility]

## 2023-02-09 ENCOUNTER — HOSPITAL ENCOUNTER (OUTPATIENT)
Dept: PHYSICAL THERAPY | Age: 38
Setting detail: RECURRING SERIES
Discharge: HOME | End: 2023-02-12
Payer: COMMERCIAL

## 2023-02-09 PROCEDURE — 97110 THERAPEUTIC EXERCISES: CPT

## 2023-02-09 ASSESSMENT — PAIN SCALES - GENERAL: PAINLEVEL_OUTOF10: 4

## 2023-02-09 NOTE — PROGRESS NOTES
Celsaotis Meigs  : 1985  Primary: Geneva Allison  Secondary:  [facility]ENNIUM  [address] Alphonse Aase [address] [place] [id number]  Phone: [phone]  Fax: [phone] Plan Frequency: 2x/week for an additional 12 weeks    Plan of Care/Certification Expiration Date: 23      PT Visit Info: Total # of Visits to Date: 62  Progress Note Counter: 2  No Show: 1     Visit Count:  62   OUTPATIENT PHYSICAL THERAPY:OP NOTE TYPE: Treatment Note 2023       Episode  Appt Desk           Treatment Diagnosis:  Pain in Right Shoulder (M25.511)  Stiffness of Right Shoulder, Not elsewhere classified (M25.611)   Strain of muscle(s) and tendon(s) of the rotator cuff of right shoulder, sequela (S46.011S)  Medical/Referring Diagnosis: s/p  Arthroscopy of right shoulder, arthroscopic subacromial decompression, arthroscopic distal clavicle resection, extensive debridement of SLAP tear, biceps labral complex, glenohumeral joint, subacromial space, mini-open rotator cuff repair and biceps tenodesis. Strain of muscle(s) and tendon(s) of the rotator cuff of right shoulder, initial encounter [S46.011A]  Referring Physician:  Katie Beltran MD MD Orders:  eval & treat, home exercise program and Full Motion, Full Strength; 2x/week x6 weeks. (22)   Return MD Appt:  23  Date of Onset:  Onset Date: 22 (surgery; 22 injury)  Allergies: Duloxetine and Erythromycin  Restrictions/Precautions: Post op restrictions and precautions R shoulder. Interventions Planned (Treatment may consist of any combination of the following):    Current Treatment Recommendations: Strengthening; ROM; Manual Therapy - Soft Tissue Mobilization; Manual Therapy - Joint Manipulation; Pain management; Home exercise program; Modalities     Subjective Comments: Says she was very sore after last session--\"7/10 for the next 2 days\".   Initial:     4/10    Post Session:    same       Medications Last Reviewed: Naproxen; gabapen 2023  Updated Objective Findings:    Palpation: Tender knots noted to R pec major, subscap, mid deltoid, teres major. Treatment   Active warm up on UBE. THERAPEUTIC EXERCISE: (40 minutes): Exercises for shoulder motion with Assisted Active Isolated Stretches in supine to ER at 30, 45, and 90 degrees abduction, IR stabilized at 45 and 90 degrees abduction, ER and IR at 90 degrees flexion, abduction, forward elevation, horizontal abduction/adduction, D1 and D2 flexion--3\"x10 each with light contract/relax; passive physiologic hold/relax stretch to shoulder quadrant position 30\"x3. Positional Release and active release to subscapularis and pecs on R for muscle dysfunction. Assisted Active Isolated Stretch in standing to ER at 30 and 90 degrees abduction, IR at 90 degrees abduction, forward elevation, and extension/IR/adduction behind the back--3\"x10 each. Functional Dry Needling: (5 Minutes): After informed consent with review of benefits and risks, Functional Dry Needling done for muscle tender/trigger point dysfunction and restriction to R mid deltoid and R upper trap. Localized twitch response noted to each area. No adverse reaction. Dry Needles Used: 1   Dry Needles Removed: 1      Date  1/9/23 Date  1/11/23 Date:  1/17/23 Date  1/19/23 Date:  1/23/23 Date  1/25/23 Date  1/30/23 Date  2/2/23 Date:  2/6/23 Date  2/9/23   Activity/Exercise   Parameters          UBE UBE L 2.5 x6' L. 2 x8' L2 x8' L. 2 x8' L3 8min; R UE only; alt forw/back L. 3 x8' on own - - L2 x 8' 1/1 L 1 x10'   ROM/Flexibility Assisted as above   Assisted as above Assisted as above Active wall slide 4 ways x15 ea;   Active mobility as above  As above  - Assisted as above CAM's in child pose D2 flex to hand behind back and return 2 x 5 Assisted as above-   Rhythmic Stabilization: - Standing ball drops at 90-deg scaption light ball 2x30 rep ea Standing ball drops at 90-degrees scaption light ball 2x30  Standing ball drops at 90-degrees scaption, flexion, abduciton with light ball x30 ea  - - - Flex at 90 and 110    ER/IR 2 x 10x -   Strength:   Shoulder ext - Standing bilat  Blue tubing  2x15 Standing bilateral blue tubing 2x15 - Standing B w/palms forw and back w/ new green tubing; 20x Standing bilateral blue tubing 2x15 Prone bilat on table top, short arc  1# 2x10 Prone bilat on table top, short arc  1# 2x10 Prone bilat on table top, short arc  1# 2x10 -   Horiz abd/Middle Trap Standing unilat horiz abd  Single red 2X10 ea -  Standing Horiz abdunilat single blue tubing x20 Standing horiz abd w/ new green tubing; 20x - Prone bilat on table top, short arc  1# 2x10 Prone bilat on table top, short arc  1# 2x10 Prone bilat on table top, short arc  1# 2x10 -   Scaption/Lower Trap - Bilat wall slide Y with UE lift off/eccentric return  Yellow heavy band 3x5 Bilateral wall slides Y w/ UE lift off eccentric return  Yellow heavy band 3x5 -  - Prone bilat on table top, short arc  1# 2x10 Prone bilat on table top, short arc  1# 2x10 Prone bilat on table top, short arc  1# 2x10 -   ER/IR-- 90 deg - -  - Stdg w/red tubing @ 90/90 w/ scap stab A; 15x each Prone unilat  2# 2x15 ea Prone bilat  1# 2x15 -  -   Standing dynamic stab w/tband perpendicular     2#; RTB; 15x each direction  - -  -   Serratus Press Standing unilat horiz add  Single red  2x10 ea standing press, upward, bilat  Blue tubing 2x15  Standing press, upward bilateral blue tubing 2x15 -  Supine bilat  5# 2x10 Supine bilat  5# 2x10, 1x5 - Supine bilat  5# 2x10,  -   Row - Standing, bilat  Blue tubing 2x15 Standing, bilateral blue tubing 2x15 -  Standing bilat  Blue 2x15 Bent over  5# 2x15 -  -   ER -- plane of scapula - -  2# 2x15 ea  2# 2x10 ea 2# 3x10 ea 2# 3x10 ea Sidelying 1# 2 x 10 -   Overhead press - -  -  - Landmine press stick+green tubing  2x10 Landmine press stick+green tubing  3x10 Landmine press stick+red tubing  3x10 -   Pull down - -  -  - - -  -   ER/IR--neutral shoulder  Short arc motion  Single blue  2x10 ea Short arc motion single blue 2x10 ea Standing ER, IR  single blue 2x15 ea  - - - Standing with LTB 2 x 10 bilat -   Forward Elevation - ER/forward reach combo  2x10  IR/Forward reach combo  Blue 2x10 ER/forward reach combo 2x10  IR/forward reach combo blue 2x10 Landmine press stick+red tubing 2x10  - - - Standing at wall for postural cues D2 flex 10 OTB -   Diagonal Ext D1:   Single green 1x10 ea;  D2:  Single green   1x10 e - D1: blue band 15x  D2: blue band 15x Single blue tubing 1x20 ea  - - -  -   Diagonal Flex - -  -  - - -  -   Thread the needle     5x B for post capsular stretching - Quadruped rotation under/over  2x5 ea -  -   CKC UE Quadruped:  Respiratory belly lift with   B UE 1x 5 breath;    Quadruped to child's pose, quadruped to cobra pose x5 ea Quadruped opposite U/LE lift 3-5\" x5 ea Quadruped opposite UE/LE lift 3-5\" x5 each Quadruped opposite 2x5 ea;  Quadruped to downward dog 2x5 Quadruped alt UE/LE; 15x each;  Cat/cow moving into wil pose; 15x;  Puppy pose; 5x;  Down dog moving into plank; 15x  Modified plank (knees/forearms); 10 x 10sec - Quadruped:  Respiratory belly lift with   B UE, R UE, L UE   1x5 breath each;    Quadruped to down dog 1x10 - -Single arm L raise in qped 10x  -Alt leg ext 10x  -Table planks with extended arms 2 x 10 secs  -qped into child pose stretch 10x -     HEP: No new HEP. [employer] Portal  7/5/22: [id number]  10/13/22: [id number] Initial scapulo-cuff strrength    Treatment/Session Summary:    Treatment Assessment: Increased soreness reported after exercises last session. Today focused more on motion and myofascial dysfunction. Trigger point dry needling done for tender knots to mid deltoid and upper trap. Good localized twitch response noted. Communication/Consultation:  None today  Equipment provided today:  None  Recommendations/Intent for next treatment session:  We will continue to progress R shoulder motion and re-strengthening as able. May utilized the dry needling for muscle dysfunction to shoulder girdle.      Total Treatment Billable Duration:  45 minutes   Time In: 1330  Time Out: 1865  [facility], PT       Charge Capture Post Session Pain  PT Visit Info  [facility] Portal  MD Guidelines  Scanned Media  Benefits  MyChart    Future Appointments   Date Time Provider Nader Jameson   2/13/2023  1:00  [facility], PT [facility]   2/16/2023  1:00  [facility], PT [facility]   2/21/2023  2:30 PM Rita Hart MD [facility]   2/23/2023  4:00  [facility], PT [facility]   2/27/2023  1:00  [facility]   3/2/2023  1:00  [facility], [facility]   3/20/2023  4:00 PM [address] [place] [address], PT [facility]   3/23/2023  4:00 PM [address] [place] [address], PT [facility]

## 2023-02-13 ENCOUNTER — HOSPITAL ENCOUNTER (OUTPATIENT)
Dept: PHYSICAL THERAPY | Age: 38
Setting detail: RECURRING SERIES
Discharge: HOME | End: 2023-02-16
Payer: COMMERCIAL

## 2023-02-13 PROCEDURE — 97110 THERAPEUTIC EXERCISES: CPT

## 2023-02-13 NOTE — PROGRESS NOTES
Stephanie Corrigan  : 1985  Primary: Pamela Steele Sc  Secondary:  [facility]  [address] Haley Ley[place] [address]  Phone: [phone]  Fax: [phone] Plan Frequency: 2x/week for an additional 12 weeks    Plan of Care/Certification Expiration Date: 23      PT Visit Info: Total # of Visits to Date: 2425 OrbFlex  Progress Note Counter: 3  No Show: 1     Visit Count:  2425 OrbFlex   OUTPATIENT PHYSICAL THERAPY:OP NOTE TYPE: Treatment Note 2023       Episode  Appt Desk           Treatment Diagnosis:  Pain in Right Shoulder (M25.511)  Stiffness of Right Shoulder, Not elsewhere classified (M25.611)   Strain of muscle(s) and tendon(s) of the rotator cuff of right shoulder, sequela (S46.011S)  Medical/Referring Diagnosis: s/p  Arthroscopy of right shoulder, arthroscopic subacromial decompression, arthroscopic distal clavicle resection, extensive debridement of SLAP tear, biceps labral complex, glenohumeral joint, subacromial space, mini-open rotator cuff repair and biceps tenodesis. Strain of muscle(s) and tendon(s) of the rotator cuff of right shoulder, initial encounter [S46.011A]  Referring Physician:  Areli Butler MD MD Orders:  eval & treat, home exercise program and Full Motion, Full Strength; 2x/week x6 weeks. (22)   Return MD Appt:  23  Date of Onset:  Onset Date: 22 (surgery; 22 injury)  Allergies: Duloxetine and Erythromycin  Restrictions/Precautions: Post op restrictions and precautions R shoulder. Interventions Planned (Treatment may consist of any combination of the following):    Current Treatment Recommendations: Strengthening; ROM; Manual Therapy - Soft Tissue Mobilization; Manual Therapy - Joint Manipulation; Pain management; Home exercise program; Modalities     Subjective Comments: Says her shoulder is doing pretty good today. Not as much activity over the weekend, so got some recovery.  Says she was a little sore for 2 days after the needling last treatment, then felt good. Initial:      No VAS    Post Session:   No VAS. Medications Last Reviewed: Naproxen; gabapen 2/13/2023  Updated Objective Findings:    No new measure. Treatment   THERAPEUTIC EXERCISE: (40 minutes): Exercises for shoulder motion and strength as per grid below. Exercises modified as indicated. Date  1/9/23 Date  1/11/23 Date:  1/17/23 Date  1/19/23 Date:  1/23/23 Date  1/25/23 Date  1/30/23 Date  2/2/23 Date:  2/6/23 Date  2/9/23 Date  2/13/23   Activity/Exercise   Parameters           UBE UBE L 2.5 x6' L. 2 x8' L2 x8' L. 2 x8' L3 8min; R UE only; alt forw/back L. 3 x8' on own - - L2 x 8' 1/1 L 1 x10' -   ROM/Flexibility Assisted as above   Assisted as above Assisted as above Active wall slide 4 ways x15 ea;   Active mobility as above  As above  - Assisted as above CAM's in child pose D2 flex to hand behind back and return 2 x 5 Assisted as above- -   Rhythmic Stabilization: - Standing ball drops at 90-deg scaption light ball 2x30 rep ea Standing ball drops at 90-degrees scaption light ball 2x30  Standing ball drops at 90-degrees scaption, flexion, abduciton with light ball x30 ea  - - - Flex at 90 and 110    ER/IR 2 x 10x - -   Strength:   Shoulder ext - Standing bilat  Blue tubing  2x15 Standing bilateral blue tubing 2x15 - Standing B w/palms forw and back w/ new green tubing; 20x Standing bilateral blue tubing 2x15 Prone bilat on table top, short arc  1# 2x10 Prone bilat on table top, short arc  1# 2x10 Prone bilat on table top, short arc  1# 2x10 - -   Horiz abd/Middle Trap Standing unilat horiz abd  Single red 2X10 ea -  Standing Horiz abdunilat single blue tubing x20 Standing horiz abd w/ new green tubing; 20x - Prone bilat on table top, short arc  1# 2x10 Prone bilat on table top, short arc  1# 2x10 Prone bilat on table top, short arc  1# 2x10 - Bent over  2# 2x10 ea   Scaption/Lower Trap - Bilat wall slide Y with UE lift off/eccentric return  Yellow heavy band 3x5 Bilateral wall slides Y w/ UE lift off eccentric return  Yellow heavy band 3x5 -  - Prone bilat on table top, short arc  1# 2x10 Prone bilat on table top, short arc  1# 2x10 Prone bilat on table top, short arc  1# 2x10 - Bent over  2# 2x10 ea   ER/IR-- 90 deg - -  - Stdg w/red tubing @ 90/90 w/ scap stab A; 15x each Prone unilat  2# 2x15 ea Prone bilat  1# 2x15 -  - -   Standing dynamic stab w/tband perpendicular     2#; RTB; 15x each direction  - -  - -   Serratus Press Standing unilat horiz add  Single red  2x10 ea standing press, upward, bilat  Blue tubing 2x15  Standing press, upward bilateral blue tubing 2x15 -  Supine bilat  5# 2x10 Supine bilat  5# 2x10, 1x5 - Supine bilat  5# 2x10,  - Supine, bilat  6# 2x10   Row - Standing, bilat  Blue tubing 2x15 Standing, bilateral blue tubing 2x15 -  Standing bilat  Blue 2x15 Bent over  5# 2x15 -  - Bent over   6# 2x15 ea   ER -- plane of scapula - -  2# 2x15 ea  2# 2x10 ea 2# 3x10 ea 2# 3x10 ea Sidelying 1# 2 x 10 - 3# 2x10 ea   Overhead press - -  -  - Landmine press stick+green tubing  2x10 Landmine press stick+green tubing  3x10 Landmine press stick+red tubing  3x10 - 60 deg incline press, bilat  3# 2x10   Pull down - -  -  - - -  - Bilat  10# cable   2x10   ER/IR--neutral shoulder  Short arc motion  Single blue  2x10 ea Short arc motion single blue 2x10 ea Standing ER, IR  single blue 2x15 ea  - - - Standing with LTB 2 x 10 bilat - -   Forward Elevation - ER/forward reach combo  2x10  IR/Forward reach combo  Blue 2x10 ER/forward reach combo 2x10  IR/forward reach combo blue 2x10 Landmine press stick+red tubing 2x10  - - - Standing at wall for postural cues D2 flex 10 OTB - Standing scaption  2# 2x10   Diagonal Ext D1:   Single green 1x10 ea;  D2:  Single green   1x10 e - D1: blue band 15x  D2: blue band 15x Single blue tubing 1x20 ea  - - -  - -   Diagonal Flex - -  -  - - -  - -   Thread the needle     5x B for post capsular stretching - Quadruped rotation under/over  2x5 ea -  - -   CKC UE Quadruped:  Respiratory belly lift with   B UE 1x 5 breath;    Quadruped to child's pose, quadruped to cobra pose x5 Carlos Gals opposite U/LE lift 3-5\" x5 ea Quadruped opposite UE/LE lift 3-5\" x5 each Quadruped opposite 2x5 ea;  Quadruped to downward dog 2x5 Quadruped alt UE/LE; 15x each;  Cat/cow moving into wil pose; 15x;  Puppy pose; 5x;  Down dog moving into plank; 15x  Modified plank (knees/forearms); 10 x 10sec - Quadruped:  Respiratory belly lift with   B UE, R UE, L UE   1x5 breath each;    Quadruped to down dog 1x10 - -Single arm L raise in qped 10x  -Alt leg ext 10x  -Table planks with extended arms 2 x 10 secs  -qped into child pose stretch 10x - -     HEP: No new HEP. Provided ice for shoulder post treatment. [facility] Portal  7/5/22: [id number]  10/13/22: [id number] Initial scapulo-cuff strrength    Treatment/Session Summary:    Treatment Assessment: Good performance of the strength exercises today. Weak to the R shoulder. Communication/Consultation:  None today  Equipment provided today:  None  Recommendations/Intent for next treatment session: We will continue to progress R shoulder motion and re-strengthening as able. May utilized the dry needling for muscle dysfunction to shoulder girdle as appropriate.  .     Total Treatment Billable Duration:  40 minutes   Time In: 9496  Time Out: [address] [facility] [facility], PT       Charge Capture Post Session Pain  PT Visit Info  [email or web address] Portal  MD Guidelines  Scanned Media  Benefits  MyChart    Future Appointments   Date Time Provider Nader Jameson   2/16/2023  1:00 PM [address] [place] [address]   2/21/2023  2:30 PM Jacob Minaya MD [facility]   2/23/2023  4:00  [facility], PT [facility]   2/27/2023  1:00 PM [address] [place] [address], PT [facility]   3/2/2023  1:00 PM [address] [place] [address], PT [facility]   3/20/2023  4:00  [facility], PT [facility]   3/23/2023  4:00 PM [address] [place] [address], PT [facility]

## 2023-02-16 ENCOUNTER — HOSPITAL ENCOUNTER (OUTPATIENT)
Dept: PHYSICAL THERAPY | Age: 38
Setting detail: RECURRING SERIES
Discharge: HOME | End: 2023-02-19
Payer: COMMERCIAL

## 2023-02-16 PROCEDURE — 97110 THERAPEUTIC EXERCISES: CPT

## 2023-02-16 NOTE — PROGRESS NOTES
Valencia Mccrary  : 1985  Primary: Babak Bowles [place]  Secondary:  [facility]  [address]  [place] [address]  Phone: [phone]  Fax: [phone] Plan Frequency: 2x/week for an additional 12 weeks    Plan of Care/Certification Expiration Date: 23      PT Visit Info: Total # of Visits to Date: 61  Progress Note Counter: 4  No Show: 1     Visit Count:  61   OUTPATIENT PHYSICAL THERAPY:OP NOTE TYPE: Treatment Note 2023       Episode  Appt Desk           Treatment Diagnosis:  Pain in Right Shoulder (M25.511)  Stiffness of Right Shoulder, Not elsewhere classified (M25.611)   Strain of muscle(s) and tendon(s) of the rotator cuff of right shoulder, sequela (S46.011S)  Medical/Referring Diagnosis: s/p  Arthroscopy of right shoulder, arthroscopic subacromial decompression, arthroscopic distal clavicle resection, extensive debridement of SLAP tear, biceps labral complex, glenohumeral joint, subacromial space, mini-open rotator cuff repair and biceps tenodesis. Strain of muscle(s) and tendon(s) of the rotator cuff of right shoulder, initial encounter [S46.011A]  Referring Physician:  Amy Bales MD MD Orders:  eval & treat, home exercise program and Full Motion, Full Strength; 2x/week x6 weeks. (22)   Return MD Appt:  23  Date of Onset:  Onset Date: 22 (surgery; 22 injury)  Allergies: Duloxetine and Erythromycin  Restrictions/Precautions: Post op restrictions and precautions R shoulder. Interventions Planned (Treatment may consist of any combination of the following):    Current Treatment Recommendations: Strengthening; ROM; Manual Therapy - Soft Tissue Mobilization; Manual Therapy - Joint Manipulation; Pain management; Home exercise program; Modalities     Subjective Comments:    Initial:      No VAS    Post Session:   No VAS.         Medications Last Reviewed: Naproxen; gabapen 2023  Updated Objective Findings:    ROM:  AROM RUE (degrees)  R Shoulder Flexion (0-180): 175 (forward elevation)  R Shoulder Int Rotation  (0-70): 70 (at 90 deg abd; to T9 behind back)  R Shoulder Ext Rotation (0-90): 75 (by side, 90 deg at 90 deg abd)  Strength:   R Shoulder: strong and painless to all with Soft Tissue Differential testing; 5- each with manual testing.  Treatment   THERAPEUTIC EXERCISE: (45 minutes): Exercises for shoulder motion with  Muscle Balance moves for upper quarter, including standing with back against wall bilateral ER in shoulder neutral, ER and IR supported 90 deg abduction, shoulder flexion, bilat danny flexion, and unilat reverse flexion slide or lower trap/teres major, 3\"x10 each with emphasis on stable spine/scapula with mobile shoulder and disassociation of movement at shoulder. Assist for end ranges to each; standing shoulder ext/add/IR behind back 3\"x10.   Exercises for shoulder girdle strength as per grid below. Exercises modified as indicated.   Date  1/9/23 Date  1/11/23 Date:  1/17/23 Date  1/19/23 Date:  1/23/23 Date  1/25/23 Date  1/30/23 Date  2/2/23 Date:  2/6/23 Date  2/9/23 Date  2/13/23 Date  2/16/23   Activity/Exercise   Parameters            UBE UBE L 2.5 x6' L. 2 x8' L2 x8' L. 2 x8' L3 8min; R UE only; alt forw/back L. 3 x8' on own - - L2 x 8' 1/1 L 1 x10' - -   ROM/Flexibility Assisted as above   Assisted as above Assisted as above Active wall slide 4 ways x15 ea;  Active mobility as above  As above  - Assisted as above CAM's in child pose D2 flex to hand behind back and return 2 x 5 Assisted as above- - Active muscle balance drills with back to wall   Rhythmic Stabilization: - Standing ball drops at 90-deg scaption light ball 2x30 rep ea Standing ball drops at 90-degrees scaption light ball 2x30  Standing ball drops at 90-degrees scaption, flexion, abduciton with light ball x30 ea  - - - Flex at 90 and 110    ER/IR 2 x 10x - - -   Strength:   Shoulder ext - Standing bilat  Blue tubing  2x15 Standing bilateral blue tubing 2x15 -  Standing B w/palms forw and back w/ new green tubing; 20x Standing bilateral blue tubing 2x15 Prone bilat on table top, short arc  1# 2x10 Prone bilat on table top, short arc  1# 2x10 Prone bilat on table top, short arc  1# 2x10 - - -   Horiz abd/Middle Trap Standing unilat horiz abd  Single red 2X10 ea -  Standing Horiz abdunilat single blue tubing x20 Standing horiz abd w/ new green tubing; 20x - Prone bilat on table top, short arc  1# 2x10 Prone bilat on table top, short arc  1# 2x10 Prone bilat on table top, short arc  1# 2x10 - Bent over  2# 2x10 ea Quadruped  2# 1x10 ea,   Prone unilat  2# 1x10 ea   Scaption/Lower Trap - Bilat wall slide Y with UE lift off/eccentric return  Yellow heavy band 3x5 Bilateral wall slides Y w/ UE lift off eccentric return  Yellow heavy band 3x5 -  - Prone bilat on table top, short arc  1# 2x10 Prone bilat on table top, short arc  1# 2x10 Prone bilat on table top, short arc  1# 2x10 - Bent over  2# 2x10 ea Quadruped  2# 1x10 ea,   Prone unilat  2# 1x10 ea   ER/IR-- 90 deg - -  - Stdg w/red tubing @ 90/90 w/ scap stab A; 15x each Prone unilat  2# 2x15 ea Prone bilat  1# 2x15 -  - - -   Standing dynamic stab w/tband perpendicular     2#; RTB; 15x each direction  - -  - - -   Serratus Press Standing unilat horiz add  Single red  2x10 ea standing press, upward, bilat  Blue tubing 2x15  Standing press, upward bilateral blue tubing 2x15 -  Supine bilat  5# 2x10 Supine bilat  5# 2x10, 1x5 - Supine bilat  5# 2x10,  - Supine, bilat  6# 2x10 Supine, bilat  6# 2x10   Row - Standing, bilat  Blue tubing 2x15 Standing, bilateral blue tubing 2x15 -  Standing bilat  Blue 2x15 Bent over  5# 2x15 -  - Bent over   6# 2x15 ea Bent over   6# 2x15 ea   ER -- plane of scapula - -  2# 2x15 ea  2# 2x10 ea 2# 3x10 ea 2# 3x10 ea Sidelying 1# 2 x 10 - 3# 2x10 ea 3# 2x15 ea   Overhead press - -  -  - Landmine press stick+green tubing  2x10 Landmine press stick+green tubing  3x10 Landmine press stick+red tubing  3x10 - 60 deg incline press, bilat  3# 2x10 60 deg incline press, bilat  3# 2x10   Pull down - -  -  - - -  - Bilat  10# cable   2x10 Bilat  10# 2x10   ER/IR--neutral shoulder  Short arc motion  Single blue  2x10 ea Short arc motion single blue 2x10 ea Standing ER, IR  single blue 2x15 ea  - - - Standing with LTB 2 x 10 bilat - - -   Forward Elevation - ER/forward reach combo  2x10  IR/Forward reach combo  Blue 2x10 ER/forward reach combo 2x10  IR/forward reach combo blue 2x10 Landmine press stick+red tubing 2x10  - - - Standing at wall for postural cues D2 flex 10 OTB - Standing scaption  2# 2x10 Standing scaption  2# 2x10   Diagonal Ext D1:   Single green 1x10 ea;  D2:  Single green   1x10 e - D1: blue band 15x  D2: blue band 15x Single blue tubing 1x20 ea  - - -  - - -   Diagonal Flex - -  -  - - -  - - -   Thread the needle     5x B for post capsular stretching - Quadruped rotation under/over  2x5 ea -  - - -   CKC UE Quadruped:  Respiratory belly lift with   B UE 1x 5 breath;    Quadruped to child's pose, quadruped to cobra pose x5 ea Quadruped opposite U/LE lift 3-5\" x5 ea Quadruped opposite UE/LE lift 3-5\" x5 each Quadruped opposite 2x5 ea;  Quadruped to downward dog 2x5 Quadruped alt UE/LE; 15x each;  Cat/cow moving into wil pose; 15x;  Puppy pose; 5x;  Down dog moving into plank; 15x  Modified plank (knees/forearms); 10 x 10sec - Quadruped:  Respiratory belly lift with   B UE, R UE, L UE   1x5 breath each;    Quadruped to down dog 1x10 - -Single arm L raise in qped 10x  -Alt leg ext 10x  -Table planks with extended arms 2 x 10 secs  -qped into child pose stretch 10x - - Quadruped:  Respiratory belly lift with   B UE 1x 5 breath; Child's pose to cobra pose x5     HEP: No new HEP. Provided ice for shoulder post treatment.      [employer] Portal  7/5/22: [id number]  10/13/22: [id number] Initial scapulo-cuff strrength    Treatment/Session Summary:    Treatment Assessment: Very good performance of the exercises. Weakness is her primary impairmnent. .  Communication/Consultation:  None today  Equipment provided today:  None  Recommendations/Intent for next treatment session: She is to return to Dr. Luis Jane next week for follow up. We will plan to continue to progress R shoulder re-strengthening as ordered.      Total Treatment Billable Duration:  45 minutes   Time In: 2198  Time Out: [address], PT       Charge Capture Post Session Pain  PT Visit Info  [employer] Portal  MD Guidelines  Scanned Media  Benefits  MyChart    Future Appointments   Date Time Provider Nader Jameson   2/21/2023  2:30 PM Ivonne Hinton MD [facility]   2/23/2023  4:00 PM True Neil, PT [facility]   2/27/2023  1:00 PM True Neil, PT [facility]   3/2/2023  1:00 PM True Neil, PT [facility]   3/20/2023  4:00 PM True Neil, PT [facility]   3/23/2023  4:00 PM [facility]

## 2023-02-20 ENCOUNTER — APPOINTMENT (OUTPATIENT)
Dept: PHYSICAL THERAPY | Age: 38
End: 2023-02-20
Payer: COMMERCIAL

## 2023-02-21 ENCOUNTER — OFFICE VISIT (OUTPATIENT)
Dept: ORTHOPEDIC SURGERY | Age: 38
End: 2023-02-21

## 2023-02-21 DIAGNOSIS — Z47.89 ORTHOPEDIC AFTERCARE: Primary | ICD-10-CM

## 2023-02-21 NOTE — PROGRESS NOTES
Name: Saravanan Barton  YOB: 1985  Gender: female  MRN: [id number]          HPI: Saravanan Barton is a 40 y.o. right-hand-dominant female 8 months status post arthroscopy right shoulder ASD, ADCR, extensive debridement SLAP tear, biceps labral complex, glenohumeral joint, subacromial space, mini open rotator cuff repair and biceps tenodesis. Operative findings were notable for a 3 cm rotator cuff tear. She returns noting she is doing better. She still has some weakness    ROS/Meds/PSH/PMH/FH/SH: A ten system review of systems was performed and is negative other than what is in the HPI. Tobacco:  reports that she quit smoking about 16 months ago. Her smoking use included cigarettes. She has a 10.00 pack-year smoking history. She has never used smokeless tobacco.  There were no vitals taken for this visit. Physical Examination:  She is an awake alert pleasant female ambulating without difficulty  She has restricted range of cervical spine motion with bilateral trapezial tenderness right greater than left    The left shoulder has 0 to 180 degrees of active and 0 to 180 degrees passive forward elevation. Pain at extremes of motion and in the overhead position  Internal rotation is to T6. External rotation is to 60 degrees at the side. In the 90 degree abducted position 90 degrees of external and 90 degrees internal rotation  The AC joint is tender  SC joint is non-tender. Greater tuberosity is non-tender. negative biceps  Negative O'Briens sign  negative lift-off sign  Negative belly press sign  Negative bear huggers sign  negative drop sign  negative hornblower's sign  No posterior glenohumeral joint line tenderness. No evident excessive external rotation  Rotator cuff strength is 5/5.  negative external rotation stress test.   Negative empty can sign  There is no evident anterior or posterior apprehension with a negative sulcus sign.    No instability  negative external and internal Rotation lag sign  Neurovascularly intact. The right shoulder has well-healed incisions  She has pain and weakness at end range of motion  The right shoulder has 0 to 180 degrees of active and 0 to 180 degrees passive forward elevation. Internal rotation is to T6. External rotation is to 60 degrees at the side. In the 90 degree abducted position 90 degrees of external and 80 degrees internal rotation  The AC joint is non-tender  SC joint is non-tender. Greater tuberosity is non-tender. negative biceps  Negative O'Briens sign  negative lift-off sign  Negative belly press sign  Negative bear huggers sign  negative drop sign  negative hornblower's sign  No posterior glenohumeral joint line tenderness. No evident excessive external rotation  Rotator cuff strength is 5/5.  negative external rotation stress test.   Negative empty can sign  There is no evident anterior or posterior apprehension with a negative sulcus sign. No instability  negative external and internal Rotation lag sign  Neurovascularly intact. Data Reviewed:      Impression:   1. Orthopedic aftercare       status post arthroscopy right shoulder ASD, ADCR, extensive debridement SLAP tear, biceps labral complex, glenohumeral joint, subacromial space, mini open rotator cuff repair and biceps tenodesis 8 months  Left shoulder pain  History of right-sided DVT  History of seizure disorder   history of RSD  Throat pain  Back pain  Neck spasm      Plan:   I discussed the plan with the patient. She continues to improve. We will continue physical therapy for 6 weeks working on full strength and full motion. She can return to work. Work restrictions will include no lifting more than 30 pounds overhead with the right arm. These restrictions will stand for 2 months. I will recheck her back in 2 months    2. Self-limited problem    Follow up: Return in about 2 months (around 4/21/2023).              Deshaun Moreno MD

## 2023-02-23 ENCOUNTER — HOSPITAL ENCOUNTER (OUTPATIENT)
Dept: PHYSICAL THERAPY | Age: 38
Setting detail: RECURRING SERIES
Discharge: HOME | End: 2023-02-26
Payer: COMMERCIAL

## 2023-02-23 PROCEDURE — 97110 THERAPEUTIC EXERCISES: CPT

## 2023-02-23 ASSESSMENT — PAIN SCALES - GENERAL: PAINLEVEL_OUTOF10: 6

## 2023-02-23 NOTE — THERAPY RECERTIFICATION
Omer Lisa  : 1985  Primary: Hal Allison  Secondary:  [facility]  [address]  [place] [address]  Phone: [phone]  Fax: [phone] Plan Frequency: 2x/week for 6 additional weeks    Plan of Care/Certification Expiration Date: 23      PT Visit Info: Total # of Visits to Date: 61      OUTPATIENT PHYSICAL THERAPY:OP NOTE TYPE: Recertification                Episode  Appt Desk         Treatment Diagnosis:  Pain in Right Shoulder (M25.511)  Stiffness of Right Shoulder, Not elsewhere classified (M25.611)   Strain of muscle(s) and tendon(s) of the rotator cuff of right shoulder, sequela (S46.011S)  Medical/Referring Diagnosis: s/p  Arthroscopy of right shoulder, arthroscopic subacromial decompression, arthroscopic distal clavicle resection, extensive debridement of SLAP tear, biceps labral complex, glenohumeral joint, subacromial space, mini-open rotator cuff repair and biceps tenodesis. Strain of muscle(s) and tendon(s) of the rotator cuff of right shoulder, initial encounter [S46.011A]  Referring Physician:  Myles Fontenot MD MD Orders: PT eval & treat, home exercise program and Full Motion, Full Strength; 2x/week x6 weeks. (23)    Return MD Appt:  23  Date of Onset:  Onset Date: 22 (surgery; 22 injury)      OBJECTIVE   ROM:  AROM RUE (degrees)  R Shoulder Flexion: 175 (forward elevation)  R Shoulder Int Rotation: 70 (at 90 deg abd; to T9 behind back)  R Shoulder Ext Rotation: 75 (by side, 90 deg at 90 deg abd)  Strength:       R Shoulder: Strong and painless to all with Soft Tissue Differential testing; 5 to all with manual testing. Special Test: Empty can: negative.  Horiz Adduction test: positive pain, decreased motion on R.   ASSESSMENT   Re-Certification Assessment: Ms. Chandan Pollard is now 8 months s/p arthroscopy of right shoulder with arthroscopic subacromial decompression, arthroscopic distal clavicle resection, extensive debridement of a type 2 SLAP tear, biceps labral complex, glenohumeral joint, subacromial space, mini-open rotator cuff repair for a 3 cm full-thickness subscapularis and infraspinatus rotator cuff tear, and biceps tenodesis on 6/17/22. She continues to make steady progress. She continues to have pain to the shoulder, especially with increased active use. She has excellent shoulder ROM. Strength is good with manual testing, but there is functional weakness to the shoulder and whole UE. Weakness is her primary impairment. She is progressing toward her discharge goals. She will benefit from continued PT for the post op shoulder rehab for continued progressive strengthening, then prep for discharge to an advanced [facility]. Problem List: (Impacting functional limitations): Body Structures, Functions, Activity Limitations Requiring Skilled Therapeutic Intervention: Decreased strength; Increased pain        Therapy Prognosis:   Therapy Prognosis: Good       PLAN   Effective Dates: 2/16/23 TO Plan of Care/Certification Expiration Date: 04/02/23        Frequency/Duration: Plan Frequency: 2x/week for an additional 6 weeks    Interventions Planned (Treatment may consist of any combination of the following):    Current Treatment Recommendations: ROM; Manual Therapy - Soft Tissue Mobilization; Manual Therapy - Joint Manipulation; Pain management; Home exercise program; Modalities; progressing to Strengthening when appropriate. Goals: (Goals have been discussed and agreed upon with patient.)  Short-Term Functional Goals:   Report no more than 5/10 intermittent pain to R shoulder with compensatory use during basic functional activities, and score less than 50% on the DASH. Met 9/29/22  R shoulder PROM forward elevation greater than 135 degrees and external rotation greater than 60 degrees to progress into functional ranges. Met 9/2/22  Demonstrate good R shoulder isometric strength with manual testing to progress into strength phase.  Met 8/1/22  Independent with initial HEP. Met 8/1/22  Discharge Goals: Time Frame:6 additional weeks  No more than 3/10 intermittent pain R shoulder with return to normalized household and work-related activities, and score less than 30% on the DASH. ongoing 2/2/23  R shoulder AROM forward elevation greater than 135 degrees, external rotation greater than 60 degrees, and strength to shoulder are grossly WNL's for safe use with normalized activities. Met 10/24/22  Demonstrate good functional shoulder strength and endurance for return to normalized household and work activities. Progressed, ongoing 2/2/23   Independent with advanced shoulder HEP for continued self-management. Progressed, ongoing 2/2/23       Outcome Measure: Tool Used: Disabilities of the Arm, Shoulder and Hand (DASH) Questionnaire - Quick Version  Score  Initial: 44/55 or 75% disability 29/55 or 41% disability (9/29/22) 37/55 or 59% disability (1/4/23) Most Recent: 33/55 or 50% disability (2/16/23)   Interpretation of Score: The DASH is designed to measure the activities of daily living in person's with upper extremity dysfunction or pain. Each section is scored on a 1-5 scale, 5 representing the greatest disability. The scores of each section are added together for a total score of 55. Medical Necessity:   She is 8 months post op R shoulder. Impairments listed above are limiting comfort and basic functional use her R/dominant UE. Weakness is her primary impairment to the shoulder. She is progressing. Reason For Services/Other Comments:  Patient continues to require skilled intervention due to the complexity fo the surgical procedure and need for safe, progressive rehab to return to functional use of her R/dominant UE. Co-morbid cervical pain, strain and L shoulder pain and cuff pathology and history of CRPS to B LE will mostly likely make progress slower.      Regarding Hunter Zuniga therapy, I certify that the treatment plan above will be carried out by a therapist or under their direction.   Thank you for this referral,    Adrianne Julio, PT, MPST, OCS      Referring Physician Signature: Mitra Sandoval., MD                      Post Session Pain  Charge Capture  PT Visit Info  POC Link  Treatment Note Link  MD Guidelines  MyChart

## 2023-02-23 NOTE — PROGRESS NOTES
Joy Yepez  : 1985  Primary: Kaia Valencia Sc  Secondary:  [facility]  [address] [place] [address]  Phone: [phone]  Fax: [phone] Plan Frequency: 2x/week for 6 additional weeks    Plan of Care/Certification Expiration Date: 23      PT Visit Info: Total # of Visits to Date: 61  Progress Note Counter: 5  No Show: 1     Visit Count:  60   OUTPATIENT PHYSICAL THERAPY:OP NOTE TYPE: Treatment Note 2023       Episode  Appt Desk           Treatment Diagnosis:  Pain in Right Shoulder (M25.511)  Stiffness of Right Shoulder, Not elsewhere classified (M25.611)   Strain of muscle(s) and tendon(s) of the rotator cuff of right shoulder, sequela (S46.011S)  Medical/Referring Diagnosis: s/p  Arthroscopy of right shoulder, arthroscopic subacromial decompression, arthroscopic distal clavicle resection, extensive debridement of SLAP tear, biceps labral complex, glenohumeral joint, subacromial space, mini-open rotator cuff repair and biceps tenodesis. Strain of muscle(s) and tendon(s) of the rotator cuff of right shoulder, initial encounter [S46.011A]  Referring Physician:  Louie Tom MD MD Orders:  PT eval & treat, home exercise program and Full Motion, Full Strength; 2x/week x6 weeks. (23)   Return MD Appt: 23  Date of Onset:  Onset Date: 22 (surgery; 22 injury)  Allergies: Duloxetine and Erythromycin  Restrictions/Precautions: Post op restrictions and precautions R shoulder. Interventions Planned (Treatment may consist of any combination of the following):    Current Treatment Recommendations: Strengthening; Home exercise program; Modalities; Dry needling     Subjective Comments: Reports having her follow up [facility] Dr. Onesimo Fontana this week. He gave her a return to work order with restrictions, and a new PT order to continue for another 6 weeks. Says her shoulder is doing pretty good. Still with stiffness and soreness to the shoulder.  It is weak and gets more sore with active use. Initial:     6No VAS    Post Session:   No VAS. Medications Last Reviewed: Naproxen; gabapen 2/23/2023  Updated Objective Findings:    See re-certification. Treatment   Active warm up on UBE. THERAPEUTIC EXERCISE: (40 minutes): Exercises for shoulder and upper quarter motion and strength as per grid. Exercises for shoulder girdle strength as per grid below. Exercises modified as indicated. MANUAL THERAPY: (3 minutes): Spinal and costovertebral joint mobilizations for stiffness and segmental dysfunctions with mid and lower thoracic screwdriver technique and mid to upper thoracic pistol technique, grade 4+ + to 5. Audibles noted. No adverse effects immediately post.    Date  1/9/23 Date  1/11/23 Date:  1/17/23 Date  1/19/23 Date:  1/23/23 Date  1/25/23 Date  1/30/23 Date  2/2/23 Date:  2/6/23 Date  2/9/23 Date  2/13/23 Date  2/16/23 Date  2/23/23   Activity/Exercise   Parameters             UBE UBE L 2.5 x6' L. 2 x8' L2 x8' L. 2 x8' L3 8min; R UE only; alt forw/back L. 3 x8' on own - - L2 x 8' 1/1 L 1 x10' - - L 1 x6'   ROM/Flexibility Assisted as above   Assisted as above Assisted as above Active wall slide 4 ways x15 ea;   Active mobility as above  As above  - Assisted as above CAM's in child pose D2 flex to hand behind back and return 2 x 5 Assisted as above- - Active muscle balance drills with back to wall Active wall slides 4 ways x15 ea   Rhythmic Stabilization: - Standing ball drops at 90-deg scaption light ball 2x30 rep ea Standing ball drops at 90-degrees scaption light ball 2x30  Standing ball drops at 90-degrees scaption, flexion, abduciton with light ball x30 ea  - - - Flex at 90 and 110    ER/IR 2 x 10x - - - -   Strength:   Shoulder ext - Standing bilat  Blue tubing  2x15 Standing bilateral blue tubing 2x15 - Standing B w/palms forw and back w/ new green tubing; 20x Standing bilateral blue tubing 2x15 Prone bilat on table top, short arc  1# 2x10 Prone bilat on table top, short arc  1# 2x10 Prone bilat on table top, short arc  1# 2x10 - - - Prone, bilat  2# 2x10   Horiz abd/Middle Trap Standing unilat horiz abd  Single red 2X10 ea -  Standing Horiz abdunilat single blue tubing x20 Standing horiz abd w/ new green tubing; 20x - Prone bilat on table top, short arc  1# 2x10 Prone bilat on table top, short arc  1# 2x10 Prone bilat on table top, short arc  1# 2x10 - Bent over  2# 2x10 ea Quadruped  2# 1x10 ea,   Prone unilat  2# 1x10 ea Prone, bilat  2# 2x10   Scaption/Lower Trap - Bilat wall slide Y with UE lift off/eccentric return  Yellow heavy band 3x5 Bilateral wall slides Y w/ UE lift off eccentric return  Yellow heavy band 3x5 -  - Prone bilat on table top, short arc  1# 2x10 Prone bilat on table top, short arc  1# 2x10 Prone bilat on table top, short arc  1# 2x10 - Bent over  2# 2x10 ea Quadruped  2# 1x10 ea,   Prone unilat  2# 1x10 ea Prone, bilat  2# 2x10   ER/IR-- 90 deg - -  - Stdg w/red tubing @ 90/90 w/ scap stab A; 15x each Prone unilat  2# 2x15 ea Prone bilat  1# 2x15 -  - - - -   Standing dynamic stab w/tband perpendicular     2#; RTB; 15x each direction  - -  - - - -   Serratus Press Standing unilat horiz add  Single red  2x10 ea standing press, upward, bilat  Blue tubing 2x15  Standing press, upward bilateral blue tubing 2x15 -  Supine bilat  5# 2x10 Supine bilat  5# 2x10, 1x5 - Supine bilat  5# 2x10,  - Supine, bilat  6# 2x10 Supine, bilat  6# 2x10 Supine, bilat  6# 2x10   Row - Standing, bilat  Blue tubing 2x15 Standing, bilateral blue tubing 2x15 -  Standing bilat  Blue 2x15 Bent over  5# 2x15 -  - Bent over   6# 2x15 ea Bent over   6# 2x15 ea Bent over   6# 2x15 ea   ER -- plane of scapula - -  2# 2x15 ea  2# 2x10 ea 2# 3x10 ea 2# 3x10 ea Sidelying 1# 2 x 10 - 3# 2x10 ea 3# 2x15 ea 3# 2x15 ea   Overhead press - -  -  - Landmine press stick+green tubing  2x10 Landmine press stick+green tubing  3x10 Landmine press stick+red tubing  3x10 - 60 deg incline press, bilat  3# 2x10 60 deg incline press, bilat  3# 2x10 60 deg incline press, bilat  3# 2x10   Pull down - -  -  - - -  - Bilat  10# cable   2x10 Bilat  10# 2x10 Bilat  10# 2x10   ER/IR--neutral shoulder  Short arc motion  Single blue  2x10 ea Short arc motion single blue 2x10 ea Standing ER, IR  single blue 2x15 ea  - - - Standing with LTB 2 x 10 bilat - - - -   Forward Elevation - ER/forward reach combo  2x10  IR/Forward reach combo  Blue 2x10 ER/forward reach combo 2x10  IR/forward reach combo blue 2x10 Landmine press stick+red tubing 2x10  - - - Standing at wall for postural cues D2 flex 10 OTB - Standing scaption  2# 2x10 Standing scaption  2# 2x10 -   Diagonal Ext D1:   Single green 1x10 ea;  D2:  Single green   1x10 e - D1: blue band 15x  D2: blue band 15x Single blue tubing 1x20 ea  - - -  - - - -   Diagonal Flex - -  -  - - -  - - - -   Thread the needle     5x B for post capsular stretching - Quadruped rotation under/over  2x5 ea -  - - - done   CKC UE Quadruped:  Respiratory belly lift with   B UE 1x 5 breath;    Quadruped to child's pose, quadruped to cobra pose x5 ea Quadruped opposite U/LE lift 3-5\" x5 ea Quadruped opposite UE/LE lift 3-5\" x5 each Quadruped opposite 2x5 ea;  Quadruped to downward dog 2x5 Quadruped alt UE/LE; 15x each;  Cat/cow moving into wil pose; 15x;  Puppy pose; 5x;  Down dog moving into plank; 15x  Modified plank (knees/forearms); 10 x 10sec - Quadruped:  Respiratory belly lift with   B UE, R UE, L UE   1x5 breath each;    Quadruped to down dog 1x10 - -Single arm L raise in qped 10x  -Alt leg ext 10x  -Table planks with extended arms 2 x 10 secs  -qped into child pose stretch 10x - - Quadruped:  Respiratory belly lift with   B UE 1x 5 breath; Child's pose to cobra pose x5 Quadruped:  Respiratory belly lift with   B UE 1x 5 breath; Child's pose to cobra pose 2x5;    Child's pose to downward dog 2x5;    Opposites  2x10     HEP: No new HEP.       [employer] Payment Portal  7/5/22: [email or web address]  10/13/22: [email or web address] Initial scapulo-cuff strrength    Treatment/Session Summary:    Treatment Assessment: Good perforamnce of the exercises done today. She is weak to the R shoulder, and also to the L. Communication/Consultation:  None today  Equipment provided today:  None  Recommendations/Intent for next treatment session: We will continue to progress R shoulder and whole upper quarter re-strengthening as ordered.      Total Treatment Billable Duration:  43 minutes   Time In: 4346  Time Out: 9731 "[employer]", PT       Charge Capture Post Session Pain  PT Visit Info  [employer] Portal  MD Guidelines  Scanned Media  Benefits  MyChart    Future Appointments   Date Time Provider Nader Jameson   2/27/2023  1:00 PM [address] [place] [address], PT [facility]   3/2/2023  1:00 PM [address] [place] [address], PT [facility]   3/20/2023  4:00 PM [address] [place] [address], PT [facility]   3/23/2023  4:00 PM [address] [place] [address], PT [facility]   4/25/2023  2:15 PM MD BUBBA Vergara GVL AMB

## 2023-02-27 ENCOUNTER — HOSPITAL ENCOUNTER (OUTPATIENT)
Dept: PHYSICAL THERAPY | Age: 38
Setting detail: RECURRING SERIES
Discharge: HOME | End: 2023-03-02
Payer: COMMERCIAL

## 2023-02-27 PROCEDURE — 97110 THERAPEUTIC EXERCISES: CPT

## 2023-02-27 NOTE — PROGRESS NOTES
Alba Melendez  : 1985  Primary: Lo Worthington Sc  [facility]:  [facility]  [address] Chriss Palacios [address] [place] [address]  Phone: [phone]  Fax: [phone] Plan Frequency: 2x/week for 6 additional weeks    Plan of Care/Certification Expiration Date: 23      PT Visit Info: Total # of Visits to Date: 64  Progress Note Counter: 6  No Show: 1     Visit Count:  61   OUTPATIENT PHYSICAL THERAPY:OP NOTE TYPE: Treatment Note 2023       Episode  Appt Desk           Treatment Diagnosis:  Pain in Right Shoulder (M25.511)  Stiffness of Right Shoulder, Not elsewhere classified (M25.611)   Strain of muscle(s) and tendon(s) of the rotator cuff of right shoulder, sequela (S46.011S)  Medical/Referring Diagnosis: s/p  Arthroscopy of right shoulder, arthroscopic subacromial decompression, arthroscopic distal clavicle resection, extensive debridement of SLAP tear, biceps labral complex, glenohumeral joint, subacromial space, mini-open rotator cuff repair and biceps tenodesis. Strain of muscle(s) and tendon(s) of the rotator cuff of right shoulder, initial encounter [S46.011A]  Referring Physician:  Anu Vargas MD MD Orders:  PT eval & treat, home exercise program and Full Motion, Full Strength; 2x/week x6 weeks. (23)   Return MD Appt: 23  Date of Onset:  Onset Date: 22 (surgery; 22 injury)  Allergies: Duloxetine and Erythromycin  Restrictions/Precautions: Post op restrictions and precautions R shoulder. Interventions Planned (Treatment may consist of any combination of the following):    Current Treatment Recommendations: Strengthening; Home exercise program; Modalities; Dry needling     Subjective Comments: Says she is feeling weak in her lower body more than the upper body. Initial:         Post Session:           Medications Last Reviewed: Naproxen; gabapen 2023  Updated Objective Findings:    No new measure.    Treatment   Active warm up on NuStep level 1 x5'.  THERAPEUTIC EXERCISE: (30 minutes): Exercises for shoulder and upper quarter motion and strength as per grid. Exercises for shoulder girdle strength as per grid below. Exercises modified as indicated. Exercises done on own with supervision and routine layout by PT. MANUAL THERAPY: (3 minutes): Spinal and costovertebral joint mobilizations for stiffness and segmental dysfunctions with mid and lower thoracic screwdriver technique and mid to upper thoracic pistol technique, grade 4+ + to 5. Audibles noted. No adverse effects immediately post.    Date  2/16/23 Date  2/23/23 Date  2/27/23   Activity/Exercise      UBE - L 1 x6' NuStep   L1 x5' on own   ROM/Flexibility Active muscle balance drills with back to wall Active wall slides 4 ways x15 ea Active wall slides 4 ways x15 ea   Rhythmic Stabilization: - - -   Strength:   Shoulder ext - Prone, bilat  2# 2x10 Prone, bilat  1# 2x10   Horiz abd/Middle Trap Quadruped  2# 1x10 ea,   Prone unilat  2# 1x10 ea Prone, bilat  2# 2x10 Prone, bilat  1# 2x10   Scaption/Lower Trap Quadruped  2# 1x10 ea,   Prone unilat  2# 1x10 ea Prone, bilat  2# 2x10 Prone, bilat  1# 2x10   ER/IR-- 90 deg - - -   Standing dynamic stab w/tband perpendicular - - -   Serratus Press Supine, bilat  6# 2x10 Supine, bilat  6# 2x10 Supine, bilat  6# 2x10   Row Bent over   6# 2x15 ea Bilat  10# 2x10 Bilat  10# 2x10   ER -- plane of scapula 3# 2x15 ea 3# 2x15 ea 3# 2x15 ea   Overhead press 60 deg incline press, bilat  3# 2x10 60 deg incline press, bilat  3# 2x10 60 deg incline press, bilat  3# 3x15   Pull down Bilat  10# 2x10 Bilat  10# 2x10 Bilat  10# 2x15   ER/IR--neutral shoulder - - -   Forward Elevation Standing scaption  2# 2x10 - Standing bilat to 90-deg  1# 3x10   Diagonal Ext - - -   Diagonal Flex - - -   Thread the needle - done -   [facility] UE Quadruped:  Respiratory belly lift with   B UE 1x 5 breath;     Child's pose to cobra pose x5 Quadruped:  Respiratory belly lift with   B UE 1x 5 breath; Child's pose to cobra pose 2x5;    Child's pose to downward dog 2x5;    Opposites  2x10 Quadruped:  Respiratory belly lift with   B UE 1x 5 breath; Child's pose to cobra pose x10;    Child's pose to downward dog x10;    Opposites  2x10     HEP: No new HEP. [employer] Portal  7/5/22: [id number]  10/13/22: [email or web address] Initial scapulo-cuff strrength    Treatment/Session Summary:    Treatment Assessment: Good effort and performance of the exercises today. Increasing workload with the exercises without change in resistance. Fatigue and muscle sore after the exercises. Communication/Consultation:  None today  Equipment provided today:  None  Recommendations/Intent for next treatment session: We will continue to progress R shoulder and whole upper quarter re-strengthening as ordered.      Total Treatment Billable Duration:  30 minutes   Time In: 1418  Time Out: Víctor PT       Charge Capture Post Session Pain  PT Visit Info  [employer] Portal  MD Guidelines  Scanned Media  Benefits  MyChart    Future Appointments   Date Time Provider Nader Jameson   3/2/2023  7:00 PM [address] [place] [address], PT [facility]   3/20/2023  4:00 PM [address] [place] [address], [facility]   3/23/2023  4:00 PM [address] [place] [address], [facility]   4/25/2023  2:15 PM MD BUBBA Hager AMB

## 2023-03-02 ENCOUNTER — HOSPITAL ENCOUNTER (OUTPATIENT)
Dept: PHYSICAL THERAPY | Age: 38
Setting detail: RECURRING SERIES
End: 2023-03-02
Payer: COMMERCIAL

## 2023-03-02 NOTE — PROGRESS NOTES
[facility]   3/23/2023  4:00 PM Gara Lust, PT [facility]   3/27/2023  3:15 PM [facility], PT [facility]   3/30/2023  4:00 PM Gara Lust, PT [facility]   4/4/2023  3:15 PM [facility], PT [facility]   4/6/2023  3:15 PM [facility], PT [facility]   4/10/2023  3:15 PM Gara Lust, PT [facility]   4/13/2023  3:15 PM Gara Lust, PT [facility]   4/17/2023  3:15 PM Gara Lust, PT [facility]   4/20/2023  3:15 PM Gara Lust, PT [facility]   4/24/2023  3:15 PM Gara Lust, PT [facility]   4/25/2023  2:15 PM MD BUBBA Bryson [facility]   4/27/2023  3:15 PM Gara Lust, PT [facility]

## 2023-03-06 ENCOUNTER — HOSPITAL ENCOUNTER (OUTPATIENT)
Dept: PHYSICAL THERAPY | Age: 38
Setting detail: RECURRING SERIES
Discharge: HOME | End: 2023-03-09
Payer: COMMERCIAL

## 2023-03-06 PROCEDURE — 97110 THERAPEUTIC EXERCISES: CPT

## 2023-03-06 ASSESSMENT — PAIN SCALES - GENERAL: PAINLEVEL_OUTOF10: 5

## 2023-03-06 NOTE — PROGRESS NOTES
Perfecto Francisca  : 1985  Primary: Cynthia Farmer Sc  Secondary:  [facility]  [address]  [address] [facility] [address]  Phone: [phone]  Fax: [phone] Plan Frequency: 2x/week for 6 additional weeks    Plan of Care/Certification Expiration Date: 23      PT Visit Info: Total # of Visits to Date: 58  Progress Note Counter: 6  No Show: 1     Visit Count:  58   OUTPATIENT PHYSICAL THERAPY:OP NOTE TYPE: Treatment Note 3/6/2023       Episode  Appt Desk           Treatment Diagnosis:  Pain in Right Shoulder (M25.511)  Stiffness of Right Shoulder, Not elsewhere classified (M25.611)   Strain of muscle(s) and tendon(s) of the rotator cuff of right shoulder, sequela (S46.011S)  Medical/Referring Diagnosis: s/p  Arthroscopy of right shoulder, arthroscopic subacromial decompression, arthroscopic distal clavicle resection, extensive debridement of SLAP tear, biceps labral complex, glenohumeral joint, subacromial space, mini-open rotator cuff repair and biceps tenodesis. Strain of muscle(s) and tendon(s) of the rotator cuff of right shoulder, initial encounter [S46.011A]  Referring Physician:  Selena Bowers MD MD Orders:  PT eval & treat, home exercise program and Full Motion, Full Strength; 2x/week x6 weeks. (23)   Return MD Appt: 23  Date of Onset:  Onset Date: 22 (surgery; 22 injury)  Allergies: Duloxetine and Erythromycin  Restrictions/Precautions: Post op restrictions and precautions R shoulder. Interventions Planned (Treatment may consist of any combination of the following):    Current Treatment Recommendations: Strengthening; Home exercise program; Modalities; Dry needling     Subjective Comments: Feeling fine. Still some shoulder weakness out in front. Initial:     5   Post Session:       5   Medications Last Reviewed: Naproxen; gabapen 3/6/2023  Updated Objective Findings:    No new measure. Treatment   Active warm up on NuStep level 1 x5'.   THERAPEUTIC EXERCISE: (40 minutes): Exercises for shoulder and upper quarter motion and strength as per grid. Exercises for shoulder girdle strength as per grid below. Exercises modified as indicated. Exercises done on own with supervision and routine layout by PT. MANUAL THERAPY: (0 minutes): Spinal and costovertebral joint mobilizations for stiffness and segmental dysfunctions with mid and lower thoracic screwdriver technique and mid to upper thoracic pistol technique, grade 4+ + to 5. Audibles noted.  No adverse effects immediately post.    Date  2/16/23 Date  2/23/23 Date  2/27/23 Date:  3/6/23   Activity/Exercise    Parameters   UBE - L 1 x6' NuStep   L1 x5' on own    ROM/Flexibility Active muscle balance drills with back to wall Active wall slides 4 ways x15 ea Active wall slides 4 ways x15 ea Active wall slides 4 ways x15 each   Rhythmic Stabilization: - - -    Strength:   Shoulder ext - Prone, bilat  2# 2x10 Prone, bilat  1# 2x10 Prone bilateral 1# 2x10   Horiz abd/Middle Trap Quadruped  2# 1x10 ea,   Prone unilat  2# 1x10 ea Prone, bilat  2# 2x10 Prone, bilat  1# 2x10 Prone bilateral 1# 2x10   Scaption/Lower Trap Quadruped  2# 1x10 ea,   Prone unilat  2# 1x10 ea Prone, bilat  2# 2x10 Prone, bilat  1# 2x10 Prone bilateral 1# 2x10   ER/IR-- 90 deg - - -    Standing dynamic stab w/tband perpendicular - - -    Serratus Press Supine, bilat  6# 2x10 Supine, bilat  6# 2x10 Supine, bilat  6# 2x10 Supine bilateral 6# 2x10   Row Bent over   6# 2x15 ea Bilat  10# 2x10 Bilat  10# 2x10 Bilateral 10# cables 2x10   ER -- plane of scapula 3# 2x15 ea 3# 2x15 ea 3# 2x15 ea 3# 2x15 each   Overhead press 60 deg incline press, bilat  3# 2x10 60 deg incline press, bilat  3# 2x10 60 deg incline press, bilat  3# 3x15 60 degree incline press, bilateral 3# 3x15   Pull down Bilat  10# 2x10 Bilat  10# 2x10 Bilat  10# 2x15 Bilateral 10# 2x15 2 hand positions   ER/IR--neutral shoulder - - -    Forward Elevation Standing scaption  2# 2x10 - Standing bilat to 90-deg  1# 3x10 Standing bilateral to 90 deg 1# 3x10    Diagonal Ext - - -    Diagonal Flex - - -    Thread the needle - done -    CKC UE Quadruped:  Respiratory belly lift with   B UE 1x 5 breath; Child's pose to cobra pose x5 Quadruped:  Respiratory belly lift with   B UE 1x 5 breath; Child's pose to cobra pose 2x5;    Child's pose to downward dog 2x5;    Opposites  2x10 Quadruped:  Respiratory belly lift with   B UE 1x 5 breath; Child's pose to cobra pose x10;    Child's pose to downward dog x10;    Opposites  2x10 Quadruped: respiratory belly lift w/ BUE 1x5 breath    Child's post to cobra pose x10    Child's pose to downward dog x10    Bird dog 2x10   Rhomboid stretch    3x arms in front    Thoracic extension    3x standing  3x seated     HEP: No new HEP. [employer] Portal  7/5/22: [id number]  10/13/22: [email or web address] Initial scapulo-cuff strrength    Treatment/Session Summary:    Treatment Assessment: Pt required minimal cues for proper performance of exercises. She had some R rhomboid area soreness following exercises and stretches did not seem to help that soreness. Communication/Consultation:  None today  Equipment provided today:  None  Recommendations/Intent for next treatment session: We will continue to progress R shoulder and whole upper quarter re-strengthening as ordered.      Total Treatment Billable Duration:  40 minutes therex  Time In: 1430  Time Out: Tyson Villa PT       Charge Capture Post Session Pain  PT Visit Info  [address] Portal  MD Guidelines  Scanned Media  Benefits  MyChart    Future Appointments   Date Time Provider Nader Jameson   3/9/2023  3:15 PM Júnior Lemos PT [facility]   3/13/2023  3:15 PM Júnior Lemos PT [facility]   3/16/2023  3:15 PM Júnior Lemos PT [facility]   3/20/2023  4:00 [facility]   3/23/2023  4:00 PM Franklyn Dumont, PT [facility]   3/27/2023  3:15 PM Júnior Lemos PT [facility]   3/30/2023  4:00 PM [facility]   4/4/2023  3:15 PM [facility]   4/6/2023  3:15 PM Eliza Clay, PT [facility]   4/10/2023  3:15 PM Yuri Celestina, PT [facility]   4/13/2023  3:15 [facility]   4/17/2023  3:15 PM Yuri Celestina, PT [facility]   4/20/2023  3:15 PM Yuri Celestina, PT [facility]   4/24/2023  3:15 PM Yuri Celestina, PT [facility]   4/25/2023  2:15 PM MD BUBBA Grewal [facility]   4/27/2023  3:15 PM Yuri Celestina, PT [facility]

## 2023-03-09 ENCOUNTER — HOSPITAL ENCOUNTER (OUTPATIENT)
Dept: PHYSICAL THERAPY | Age: 38
Setting detail: RECURRING SERIES
Discharge: HOME | End: 2023-03-12
Payer: COMMERCIAL

## 2023-03-09 PROCEDURE — 97140 MANUAL THERAPY 1/> REGIONS: CPT

## 2023-03-09 PROCEDURE — 97110 THERAPEUTIC EXERCISES: CPT

## 2023-03-09 ASSESSMENT — PAIN SCALES - GENERAL: PAINLEVEL_OUTOF10: 6

## 2023-03-09 NOTE — PROGRESS NOTES
Donnell Monroe  : 1985  Primary: Alfonso Dougherty Sc  Secondary:  SFO MILLENNIUM  [address] Tanvir Cali [address] [place] [address]  Phone: [phone]  Fax: [phone] Plan Frequency: 2x/week for 6 additional weeks    Plan of Care/Certification Expiration Date: 23      PT Visit Info: Total # of Visits to Date: 61  Progress Note Counter: 6  No Show: 1     Visit Count:  61   OUTPATIENT PHYSICAL THERAPY:OP NOTE TYPE: Treatment Note 3/9/2023       Episode  Appt Desk           Treatment Diagnosis:  Pain in Right Shoulder (M25.511)  Stiffness of Right Shoulder, Not elsewhere classified (M25.611)   Strain of muscle(s) and tendon(s) of the rotator cuff of right shoulder, sequela (S46.011S)  Medical/Referring Diagnosis: s/p  Arthroscopy of right shoulder, arthroscopic subacromial decompression, arthroscopic distal clavicle resection, extensive debridement of SLAP tear, biceps labral complex, glenohumeral joint, subacromial space, mini-open rotator cuff repair and biceps tenodesis. Strain of muscle(s) and tendon(s) of the rotator cuff of right shoulder, initial encounter [S46.011A]  Referring Physician:  Mary Sullivan MD MD Orders:  PT eval & treat, home exercise program and Full Motion, Full Strength; 2x/week x6 weeks. (23)   Return MD Appt: 23  Date of Onset:  Onset Date: 22 (surgery; 22 injury)  Allergies: Duloxetine and Erythromycin  Restrictions/Precautions: Post op restrictions and precautions R shoulder. Interventions Planned (Treatment may consist of any combination of the following):    Current Treatment Recommendations: Strengthening; Home exercise program; Modalities; Dry needling     Subjective Comments: Still some soreness in shoulders. Very stressed today due to personal reasons. Initial:     6/10   Post Session:       3 /10  Medications Last Reviewed: Naproxen; gabapen 3/9/2023  Updated Objective Findings:    No new measure.    Treatment     THERAPEUTIC EXERCISE: (30 minutes): Exercises for shoulder and upper quarter motion and strength as per grid. Exercises for shoulder girdle strength as per grid below. Exercises modified as indicated. Exercises done on own with supervision and routine layout by PT.    MANUAL THERAPY: (10 minutes): STM to L pec and L upper trap/levator in sit to decrease pain/spasm and improve motion and radicular symptoms  MECHANICAL TRACTION (5 mins) 10# max pull, mobile unit to decrease radicular symptoms      Date  2/16/23 Date  2/23/23 Date  2/27/23 Date:  3/6/23 Date:  3/9/23   Activity/Exercise    Parameters Parameters   UBE - L 1 x6' NuStep   L1 x5' on own     ROM/Flexibility Active muscle balance drills with back to wall Active wall slides 4 ways x15 ea Active wall slides 4 ways x15 ea Active wall slides 4 ways x15 each    Rhythmic Stabilization: - - -     Strength:   Shoulder ext - Prone, bilat  2# 2x10 Prone, bilat  1# 2x10 Prone bilateral 1# 2x10 Prone bilateral 1# 2x10   Horiz abd/Middle Trap Quadruped  2# 1x10 ea,   Prone unilat  2# 1x10 ea Prone, bilat  2# 2x10 Prone, bilat  1# 2x10 Prone bilateral 1# 2x10 Prone bilateral 1# 2x10    Scaption/Lower Trap Quadruped  2# 1x10 ea,   Prone unilat  2# 1x10 ea Prone, bilat  2# 2x10 Prone, bilat  1# 2x10 Prone bilateral 1# 2x10 Prone bilateral 1# 2x10   ER/IR-- 90 deg - - -     Standing dynamic stab w/tband perpendicular - - -     Serratus Press Supine, bilat  6# 2x10 Supine, bilat  6# 2x10 Supine, bilat  6# 2x10 Supine bilateral 6# 2x10 Supine bilateral 6# 2x10   Row Bent over   6# 2x15 ea Bilat  10# 2x10 Bilat  10# 2x10 Bilateral 10# cables 2x10    ER -- plane of scapula 3# 2x15 ea 3# 2x15 ea 3# 2x15 ea 3# 2x15 each 3# 2x15 each   Overhead press 60 deg incline press, bilat  3# 2x10 60 deg incline press, bilat  3# 2x10 60 deg incline press, bilat  3# 3x15 60 degree incline press, bilateral 3# 3x15 60* incline press, bilateral 3# 3x15   Pull down Bilat  10# 2x10 Bilat  10# 2x10 Bilat  10# 2x15 Bilateral 10# 2x15 2 hand positions Bilateral 10# 2x15   Wide    ER/IR--neutral shoulder - - -     Forward Elevation Standing scaption  2# 2x10 - Standing bilat to 90-deg  1# 3x10 Standing bilateral to 90 deg 1# 3x10     Diagonal Ext - - -     Diagonal Flex - - -     Thread the needle - done -     CKC UE Quadruped:  Respiratory belly lift with   B UE 1x 5 breath; Child's pose to cobra pose x5 Quadruped:  Respiratory belly lift with   B UE 1x 5 breath; Child's pose to cobra pose 2x5;    Child's pose to downward dog 2x5;    Opposites  2x10 Quadruped:  Respiratory belly lift with   B UE 1x 5 breath; Child's pose to cobra pose x10;    Child's pose to downward dog x10;    Opposites  2x10 Quadruped: respiratory belly lift w/ BUE 1x5 breath    Child's post to cobra pose x10    Child's pose to downward dog x10    Bird dog 2x10    Rhomboid stretch    3x arms in front     Thoracic extension    3x standing  3x seated    Biceps     3# 5x until elbow pain flared   Triceps          HEP: No new HEP. [employer] Portal  7/5/22: [id number]  10/13/22: [id number] Initial scapulo-cuff strrength    Treatment/Session Summary:    Treatment Assessment: Pt reported during lat pull down that she had some tingling in her elbow and reports she gets tingling all the time down her arm when she moves her arm certain ways. With testing, she had improving tingling with traction and improved tingling with STM to L upper trap/levator and pec. Assess changes next session. Communication/Consultation:  None today  Equipment provided today:  None  Recommendations/Intent for next treatment session: We will continue to progress R shoulder and whole upper quarter re-strengthening as ordered.      Total Treatment Billable Duration:  30 minutes therex, 10 mins manual, 5 mins traction   Time In: 1517  Time Out: Nimesh Ayala PT       Charge Capture Post Session Pain  PT Visit Info  [employer] Portal  MD Guidelines  Scanned Media  Benefits Gail    Future Appointments   Date Time Provider Nader Jameson   3/13/2023  3:15 PM Amanda Espinoza, PT [facility]   3/16/2023  3:15 PM Amanda Espinoza, PT [facility]   3/20/2023  4:00 PM [facility]   3/23/2023  4:00 PM [facility]   3/27/2023  3:15 PM Amanda Espinoza, PT [facility]   3/30/2023  4:00 PM Mercedes Shah, PT [facility]   4/4/2023  3:15 PM Amanda Espinoza, PT [facility]   4/6/2023  3:15 PM Amanda Espinoza, PT [facility]   4/10/2023  3:15 PM Mercedes Shah, PT [facility]   4/13/2023  3:15 PM Mercedes Shah, PT [facility]   4/17/2023  3:15 PM [facility]   4/20/2023  3:15 PM [facility]   4/24/2023  3:15 PM Mercedes Shah, PT [facility]   4/25/2023  2:15 PM Noble Vu MD [facility]   4/27/2023  3:15 PM Mercedes Shah, PT [facility]

## 2023-03-13 ENCOUNTER — HOSPITAL ENCOUNTER (OUTPATIENT)
Dept: PHYSICAL THERAPY | Age: 38
Setting detail: RECURRING SERIES
Discharge: HOME | End: 2023-03-16
Payer: COMMERCIAL

## 2023-03-13 PROCEDURE — 97110 THERAPEUTIC EXERCISES: CPT

## 2023-03-13 ASSESSMENT — PAIN SCALES - GENERAL: PAINLEVEL_OUTOF10: 4

## 2023-03-13 NOTE — PROGRESS NOTES
Mayelaalec Mata  : 1985  Primary: Joe Costa Sc  Secondary:  [facility] MILLENNIUM  [address] Pavithra Fuentes [address] [place], [place] [address] [address]  Phone: [phone]  Fax: [phone] Plan Frequency: 2x/week for 6 additional weeks    Plan of Care/Certification Expiration Date: 23      PT Visit Info: Total # of Visits to Date: 59  Progress Note Counter: 6  No Show: 1     Visit Count:  64   OUTPATIENT PHYSICAL THERAPY:OP NOTE TYPE: Treatment Note 3/13/2023       Episode  Appt Desk           Treatment Diagnosis:  Pain in Right Shoulder (M25.511)  Stiffness of Right Shoulder, Not elsewhere classified (M25.611)   Strain of muscle(s) and tendon(s) of the rotator cuff of right shoulder, sequela (S46.011S)  Medical/Referring Diagnosis: s/p  Arthroscopy of right shoulder, arthroscopic subacromial decompression, arthroscopic distal clavicle resection, extensive debridement of SLAP tear, biceps labral complex, glenohumeral joint, subacromial space, mini-open rotator cuff repair and biceps tenodesis. Strain of muscle(s) and tendon(s) of the rotator cuff of right shoulder, initial encounter [S46.011A]  Referring Physician:  Floresita Paiz MD MD Orders:  PT eval & treat, home exercise program and Full Motion, Full Strength; 2x/week x6 weeks. (23)   Return MD Appt: 23  Date of Onset:  Onset Date: 22 (surgery; 22 injury)  Allergies: Duloxetine and Erythromycin  Restrictions/Precautions: Post op restrictions and precautions R shoulder. Interventions Planned (Treatment may consist of any combination of the following):    Current Treatment Recommendations: Strengthening; Home exercise program; Modalities; Dry needling     Subjective Comments: Soreness was better last time compared to the time before that. Has been stretching more and that is helping. Initial:     4/10   Post Session:       3 /10  Medications Last Reviewed: Naproxen; gabapen 3/13/2023  Updated Objective Findings:    No new measure.    Treatment THERAPEUTIC EXERCISE: (40 minutes): Exercises for shoulder and upper quarter motion and strength as per grid. Exercises for shoulder girdle strength as per grid below. Exercises modified as indicated. Exercises done on own with supervision and routine layout by PT.    MANUAL THERAPY: (0 minutes): STM to L pec and L upper trap/levator in sit to decrease pain/spasm and improve motion and radicular symptoms  MECHANICAL TRACTION (0 mins) 10# max pull, mobile unit to decrease radicular symptoms      Date  2/16/23 Date  2/23/23 Date  2/27/23 Date:  3/6/23 Date:  3/9/23 Date:  3/13/23   Activity/Exercise    Parameters Parameters    UBE - L 1 x6' NuStep   L1 x5' on own   4/4 2.0 working strength and motion   ROM/Flexibility Active muscle balance drills with back to wall Active wall slides 4 ways x15 ea Active wall slides 4 ways x15 ea Active wall slides 4 ways x15 each     Rhythmic Stabilization: - - -      Strength:   Shoulder ext - Prone, bilat  2# 2x10 Prone, bilat  1# 2x10 Prone bilateral 1# 2x10 Prone bilateral 1# 2x10 Prone bilateral 2x10 1#   Horiz abd/Middle Trap Quadruped  2# 1x10 ea,   Prone unilat  2# 1x10 ea Prone, bilat  2# 2x10 Prone, bilat  1# 2x10 Prone bilateral 1# 2x10 Prone bilateral 1# 2x10  Prone bilateral 2x10 1#   Scaption/Lower Trap Quadruped  2# 1x10 ea,   Prone unilat  2# 1x10 ea Prone, bilat  2# 2x10 Prone, bilat  1# 2x10 Prone bilateral 1# 2x10 Prone bilateral 1# 2x10 Prone bilateral 2x10 1#   ER/IR-- 90 deg - - -      Standing dynamic stab w/tband perpendicular - - -      Serratus Press Supine, bilat  6# 2x10 Supine, bilat  6# 2x10 Supine, bilat  6# 2x10 Supine bilateral 6# 2x10 Supine bilateral 6# 2x10 Supine bilateral 6# 2x10   Row Bent over   6# 2x15 ea Bilat  10# 2x10 Bilat  10# 2x10 Bilateral 10# cables 2x10  Bilateral 10# cables 2x10   ER -- plane of scapula 3# 2x15 ea 3# 2x15 ea 3# 2x15 ea 3# 2x15 each 3# 2x15 each 3# 2x15 each   Overhead press 60 deg incline press, bilat  3# 2x10 60 deg incline press, bilat  3# 2x10 60 deg incline press, bilat  3# 3x15 60 degree incline press, bilateral 3# 3x15 60* incline press, bilateral 3# 3x15 3# 60* incline bilateral 3# 2x15   Pull down Bilat  10# 2x10 Bilat  10# 2x10 Bilat  10# 2x15 Bilateral 10# 2x15 2 hand positions Bilateral 10# 2x15   Wide  Bilateral 10# 2x15 wide    ER/IR--neutral shoulder - - -      Forward Elevation Standing scaption  2# 2x10 - Standing bilat to 90-deg  1# 3x10 Standing bilateral to 90 deg 1# 3x10   Standing bilateral to 90* 1# 2x10   Diagonal Ext - - -      Diagonal Flex - - -      Thread the needle - done -      CKC UE Quadruped:  Respiratory belly lift with   B UE 1x 5 breath; Child's pose to cobra pose x5 Quadruped:  Respiratory belly lift with   B UE 1x 5 breath; Child's pose to cobra pose 2x5;    Child's pose to downward dog 2x5;    Opposites  2x10 Quadruped:  Respiratory belly lift with   B UE 1x 5 breath; Child's pose to cobra pose x10;    Child's pose to downward dog x10;    Opposites  2x10 Quadruped: respiratory belly lift w/ BUE 1x5 breath    Child's post to cobra pose x10    Child's pose to downward dog x10    Bird dog 2x10  Quadruped respiratory bellt lift w/ BUE 1x5    Child's pose to cobra pose x10    Child's pose to downward dog x10    Bird dog 2x10   Rhomboid stretch    3x arms in front      Thoracic extension    3x standing  3x seated     Biceps     3# 5x until elbow pain flared    Triceps           HEP: No new HEP. [employer] Portal  7/5/22: [id number]  10/13/22: [id number] Initial scapulo-cuff strrength    Treatment/Session Summary:    Treatment Assessment: Pt challenged w/ moving prone exercises to mat vs weight bench. Overall she is progressing towards her goals and has improved her strength. Still some residual strength deficits R vs L. Communication/Consultation:  None today  Equipment provided today:  None  Recommendations/Intent for next treatment session:  We will continue to progress R shoulder and whole upper quarter re-strengthening as ordered.      Total Treatment Billable Duration:  40 minutes therex,  Time In: 0483  Time Out: [address], [address] Session Pain  PT Visit Info  [facility] Portal  MD Guidelines  Scanned Media  Benefits  MyChart    Future Appointments   Date Time Provider Nader Gisell   3/16/2023  3:15 PM [facility]   3/20/2023  4:00 PM Thuy Alex, PT [facility]   3/23/2023  4:00 PM Thuy Alex, PT [facility]   3/27/2023  3:15 PM Tabby Curia, PT [facility]   3/30/2023  4:00 PM Thuy Alex, PT [facility]   4/4/2023  3:15 PM Tabby Curia, PT [facility]   4/6/2023  3:15 PM Tabby Curia, PT [facility]   4/10/2023  3:15 PM Thuy Alex, PT [facility]   4/13/2023  3:15 PM Thuy Alex, PT [facility]   4/17/2023  3:15 PM Thuy Alex, PT [facility]   4/20/2023  3:15 PM [facility]   4/24/2023  3:15 PM [facility]   4/25/2023  2:15 PM Brett Patten MD [facility]   4/27/2023  3:15 PM Thuy Alex, PT [facility]

## 2023-03-16 ENCOUNTER — HOSPITAL ENCOUNTER (OUTPATIENT)
Dept: PHYSICAL THERAPY | Age: 38
Setting detail: RECURRING SERIES
Discharge: HOME | End: 2023-03-19
Payer: COMMERCIAL

## 2023-03-16 PROCEDURE — 97110 THERAPEUTIC EXERCISES: CPT

## 2023-03-16 ASSESSMENT — PAIN SCALES - GENERAL: PAINLEVEL_OUTOF10: 3

## 2023-03-16 NOTE — PROGRESS NOTES
scapulo-cuff strrength    Treatment/Session Summary:    Treatment Assessment: Pt reported some soreness with exercises but liked the challenge of the exercises. Overall her strength is improving and her form was better this session. Communication/Consultation:  None today  Equipment provided today:  None  Recommendations/Intent for next treatment session: We will continue to progress R shoulder and whole upper quarter re-strengthening as ordered.      Total Treatment Billable Duration:  38 minutes therex  Time In: 1481  Time Out: [address], PT       Charge Capture Post Session Pain  PT Visit Info  [facility] Portal  MD Guidelines  Scanned Media  Benefits  MyChart    Future Appointments   Date Time Provider [place]   3/20/2023  4:00 PM [facility]eness[facility] Sledge [place]   3/23/2023  4:00 PM Ceci Girt, PT [facility]   3/27/2023  3:15 PM Javed Corrigan, PT [facility]   3/30/2023  4:00 PM Ceci Girt, PT [facility]   4/4/2023  3:15 PM Javed Corrigan, PT [facility]   4/6/2023  3:15 PM Javed Corrigan, PT [facility]   4/10/2023  3:15 PM Ceci Girt, PT [facility]   4/13/2023  3:15 PM Ceci Girt, PT [facility]   4/17/2023  3:15 PM Ceci Girt, PT [facility]   4/20/2023  3:15 PM Ceci Girt, PT [facility]   4/24/2023  3:15 PM Ceci Girt, PT [facility]   4/25/2023  2:15 PM Mckayla Munoz MD [facility]   4/27/2023  3:15 PM Ceci Girt, PT [facility]

## 2023-03-20 ENCOUNTER — HOSPITAL ENCOUNTER (OUTPATIENT)
Dept: PHYSICAL THERAPY | Age: 38
Setting detail: RECURRING SERIES
Discharge: HOME | End: 2023-03-23
Payer: COMMERCIAL

## 2023-03-20 PROCEDURE — 97110 THERAPEUTIC EXERCISES: CPT

## 2023-03-20 ASSESSMENT — PAIN SCALES - GENERAL: PAINLEVEL_OUTOF10: 4

## 2023-03-20 NOTE — PROGRESS NOTES
(45 minutes): Exercises for active shoulder motion with Muscle Balance moves for upper quarter, including standing with back against wall bilateral ER in shoulder neutral, ER and IR in abduction (close to 90 deg as possible), facing wall bilat ER at 90 deg flexion and bilat flexion forearm wall slide, back to wall bilat reverse wall slide Diamonds, reverse wall slide/lat dorsi active stretch, and straight arm flexion, 3\"x10 each with emphasis on stable spine/scapula with mobile shoulder and disassociation of movement at shoulder. Verbal and visual cues for these. Exercise for shoulder and upper quarter motion and strength as per grid. Exercises modified as indicated. Date  2/16/23 Date  2/23/23 Date  2/27/23 Date:  3/6/23 Date:  3/9/23 Date:  3/13/23 Date:  3/16/23 Date  3/20/23   Activity/Exercise    Parameters Parameters Parameters Parameters    UBE - L 1 x6' NuStep   L1 x5' on own   4/4 2.0 working strength and motion 3/3 2.0 working strength and motion  x8' on own   ROM/Flexibility Active muscle balance drills with back to wall Active wall slides 4 ways x15 ea Active wall slides 4 ways x15 ea Active wall slides 4 ways x15 each    Active mobility as above;    Child's pose x10   Rhythmic Stabilization: - - -     -   Strength:   Shoulder ext - Prone, bilat  2# 2x10 Prone, bilat  1# 2x10 Prone bilateral 1# 2x10 Prone bilateral 1# 2x10 Prone bilateral 2x10 1# Prone bilatereal 2x10 1# -   Horiz abd/Middle Trap Quadruped  2# 1x10 ea,   Prone unilat  2# 1x10 ea Prone, bilat  2# 2x10 Prone, bilat  1# 2x10 Prone bilateral 1# 2x10 Prone bilateral 1# 2x10  Prone bilateral 2x10 1# Prone bilateral 2x10 1# -   Scaption/Lower Trap Quadruped  2# 1x10 ea,   Prone unilat  2# 1x10 ea Prone, bilat  2# 2x10 Prone, bilat  1# 2x10 Prone bilateral 1# 2x10 Prone bilateral 1# 2x10 Prone bilateral 2x10 1# Prone bilateral 2x10 1# -   ER/IR-- 90 deg - - -     -   Standing dynamic stab w/tband perpendicular - - -     [employer]

## 2023-03-23 ENCOUNTER — HOSPITAL ENCOUNTER (OUTPATIENT)
Dept: PHYSICAL THERAPY | Age: 38
Setting detail: RECURRING SERIES
Discharge: HOME | End: 2023-03-26
Payer: COMMERCIAL

## 2023-03-23 PROCEDURE — 97110 THERAPEUTIC EXERCISES: CPT

## 2023-03-23 ASSESSMENT — PAIN SCALES - GENERAL: PAINLEVEL_OUTOF10: 3

## 2023-03-23 NOTE — PROGRESS NOTES
Michaelle Pena  : 1985  Primary: Rosy Guzman Sc  Secondary:  SFO MILLENNIUM  [address]  [place] [address]  Phone: [phone]  Fax: [phone] Plan Frequency: 2x/week for 6 additional weeks    Plan of Care/Certification Expiration Date: 23      PT Visit Info: Total # of Visits to Date: 79  Progress Note Counter: 7  No Show: 1     Visit Count:  79   OUTPATIENT PHYSICAL THERAPY:OP NOTE TYPE: Treatment Note 3/23/2023       Episode  Appt Desk           Treatment Diagnosis:  Pain in Right Shoulder (M25.511)  Stiffness of Right Shoulder, Not elsewhere classified (M25.611)   Strain of muscle(s) and tendon(s) of the rotator cuff of right shoulder, sequela (S46.011S)  Medical/Referring Diagnosis: s/p  Arthroscopy of right shoulder, arthroscopic subacromial decompression, arthroscopic distal clavicle resection, extensive debridement of SLAP tear, biceps labral complex, glenohumeral joint, subacromial space, mini-open rotator cuff repair and biceps tenodesis. Strain of muscle(s) and tendon(s) of the rotator cuff of right shoulder, initial encounter [S46.011A]  Referring Physician:  Mike Uribe MD MD Orders:  PT eval & treat, home exercise program and Full Motion, Full Strength; 2x/week x6 weeks. (23)   Return MD Appt: 23  Date of Onset:  Onset Date: 22 (surgery; 22 injury)    Allergies: Duloxetine and Erythromycin  Restrictions/Precautions: Post op restrictions and precautions R shoulder. Interventions Planned (Treatment may consist of any combination of the following):    Current Treatment Recommendations: Strengthening; Home exercise program; Modalities; Dry needling     Subjective Comments: Shoulder was sore after last session. Initial:     3/10  Post Session:         No VAS. Medications Last Reviewed: Naproxen; gabapen 3/23/2023  Updated Objective Findings:    No new measure. Treatment   UBE for active warm up on own.   THERAPEUTIC EXERCISE: (45

## 2023-03-27 ENCOUNTER — HOSPITAL ENCOUNTER (OUTPATIENT)
Dept: PHYSICAL THERAPY | Age: 38
Setting detail: RECURRING SERIES
Discharge: HOME | End: 2023-03-30
Payer: COMMERCIAL

## 2023-03-27 PROCEDURE — 97110 THERAPEUTIC EXERCISES: CPT

## 2023-03-27 ASSESSMENT — PAIN SCALES - GENERAL: PAINLEVEL_OUTOF10: 3

## 2023-03-27 NOTE — PROGRESS NOTES
Jonas Brooks  : 1985  Primary: Olamide Lynch Sc  Secondary:  O MILLENNIUM  [address] Babs Lunsford [address] [place] [address]  Phone: [phone]  Fax: [phone] Plan Frequency: 2x/week for 6 additional weeks    Plan of Care/Certification Expiration Date: 23      PT Visit Info: Total # of Visits to Date: 76  Progress Note Counter: 7  No Show: 1     Visit Count:  76   OUTPATIENT PHYSICAL THERAPY:OP NOTE TYPE: Treatment Note 3/27/2023       Episode  Appt Desk           Treatment Diagnosis:  Pain in Right Shoulder (M25.511)  Stiffness of Right Shoulder, Not elsewhere classified (M25.611)   Strain of muscle(s) and tendon(s) of the rotator cuff of right shoulder, sequela (S46.011S)  Medical/Referring Diagnosis: s/p  Arthroscopy of right shoulder, arthroscopic subacromial decompression, arthroscopic distal clavicle resection, extensive debridement of SLAP tear, biceps labral complex, glenohumeral joint, subacromial space, mini-open rotator cuff repair and biceps tenodesis. Strain of muscle(s) and tendon(s) of the rotator cuff of right shoulder, initial encounter [S46.011A]  Referring Physician:  Anastasia Reis MD MD Orders:  PT eval & treat, home exercise program and Full Motion, Full Strength; 2x/week x6 weeks. (23)   Return MD Appt: 23  Date of Onset:  Onset Date: 22 (surgery; 22 injury)    Allergies: Duloxetine and Erythromycin  Restrictions/Precautions: Post op restrictions and precautions R shoulder. Interventions Planned (Treatment may consist of any combination of the following):    Current Treatment Recommendations: Strengthening; Home exercise program; Modalities; Dry needling     Subjective Comments: Was sore after the therex increase last time. Initial:     3/10  Post Session:       3 No VAS. Medications Last Reviewed: Naproxen; gabapen 3/27/2023  Updated Objective Findings:    No new measure. Treatment   UBE for active warm up on own.   THERAPEUTIC EXERCISE:

## 2023-03-30 ENCOUNTER — HOSPITAL ENCOUNTER (OUTPATIENT)
Dept: PHYSICAL THERAPY | Age: 38
Setting detail: RECURRING SERIES
End: 2023-03-30
Payer: COMMERCIAL

## 2023-03-30 PROCEDURE — 97110 THERAPEUTIC EXERCISES: CPT

## 2023-03-31 NOTE — PROGRESS NOTES
Kendal Bolden  : 1985  Primary: Cindy Wang Sc  Secondary:  [facility] MILLENNIUM  [address] [place] [address]  Phone: [phone]  Fax: [phone] Plan Frequency: 2x/week for 6 additional weeks    Plan of Care/Certification Expiration Date: 23      PT Visit Info: Total # of Visits to Date: 71  Progress Note Counter: 9  No Show: 1     Visit Count:  71   OUTPATIENT PHYSICAL THERAPY:OP NOTE TYPE: Treatment Note 3/30/2023       Episode  Appt Desk           Treatment Diagnosis:  Pain in Right Shoulder (M25.511)  Stiffness of Right Shoulder, Not elsewhere classified (M25.611)   Strain of muscle(s) and tendon(s) of the rotator cuff of right shoulder, sequela (S46.011S)  Medical/Referring Diagnosis: s/p  Arthroscopy of right shoulder, arthroscopic subacromial decompression, arthroscopic distal clavicle resection, extensive debridement of SLAP tear, biceps labral complex, glenohumeral joint, subacromial space, mini-open rotator cuff repair and biceps tenodesis. Strain of muscle(s) and tendon(s) of the rotator cuff of right shoulder, initial encounter [S46.011A]  Referring Physician:  Monalisa Evans MD MD Orders:  PT eval & treat, home exercise program and Full Motion, Full Strength; 2x/week x6 weeks. (23)   Return MD Appt: 23  Date of Onset:  Onset Date: 22 (surgery; 22 injury)    Allergies: Duloxetine and Erythromycin  Restrictions/Precautions: Post op restrictions and precautions R shoulder. Interventions Planned (Treatment may consist of any combination of the following):    Current Treatment Recommendations: Strengthening; Home exercise program; Modalities; Dry needling     Subjective Comments:    Initial:      No VAS  Post Session:         No VAS. Medications Last Reviewed: Naproxen; gabapen 3/30/2023  Updated Objective Findings:    No new measure. Treatment   UBE for active warm up on own.   THERAPEUTIC EXERCISE: (30 minutes): Exercise for shoulder and bilatereal 2x10 1# - -  -   Horiz abd/Middle Trap Prone, bilat  1# 2x10 Prone bilateral 1# 2x10 Prone bilateral 1# 2x10  Prone bilateral 2x10 1# Prone bilateral 2x10 1# - -  -   Scaption/Lower Trap Prone, bilat  1# 2x10 Prone bilateral 1# 2x10 Prone bilateral 1# 2x10 Prone bilateral 2x10 1# Prone bilateral 2x10 1# - -  -   ER/IR-- 90 deg -     - -  -   Standing dynamic stab w/tband perpendicular -     - -  -   Serratus Press Supine, bilat  6# 2x10 Supine bilateral 6# 2x10 Supine bilateral 6# 2x10 Supine bilateral 6# 2x10 Supine bilateral 6# 2x10 Supine press, bilat with green tubing pull apart  6# 2x10 Supine press, bilat with orange band  pull apart  6# 3x10 Supine press bilateral w/ orange band pull apart 6# 3x10 -   Row Bilat  10# 2x10 Bilateral 10# cables 2x10  Bilateral 10# cables 2x10 Bilateral 10# cables 2x10  Bent over, bilat with green tuping pull apart  6# 2x15 Bent over, bilat with orange band pull apart  6# 2x10 Bent over, bilat with orange band pull apart  6# 2x10 -   ER -- plane of scapula 3# 2x15 ea 3# 2x15 each 3# 2x15 each 3# 2x15 each 3# 2x15 each - 4# 3x10 ea 4# 3x10 ea -   Overhead press 60 deg incline press, bilat  3# 3x15 60 degree incline press, bilateral 3# 3x15 60* incline press, bilateral 3# 3x15 60* incline bilateral 3# 2x15 60* incline bilateral 3# 2x15 - 60* incline bilateral, orange band pull apart  4# 3x10 60* incline bilateral, orange band pull apart  4# 3x10 Standing row/ER/press combo  Green tubing  2x10   Pull down Bilat  10# 2x15 Bilateral 10# 2x15 2 hand positions Bilateral 10# 2x15   Wide  Bilateral 10# 2x15 wide  Bilateral 10# 2x15 wide  - Bilat   Netural and underhand   13# 3x10 Bilat   Netural and underhand   13# 3x10 --   ER/IR--neutral shoulder -     - -  -   Forward Elevation Standing bilat to 90-deg  1# 3x10 Standing bilateral to 90 deg 1# 3x10   Standing bilateral to 90* 1# 2x10 Standing bilateral to 90* 1# 2x10 Standing bilateral to 90-deg with green

## 2023-04-04 ENCOUNTER — HOSPITAL ENCOUNTER (OUTPATIENT)
Dept: PHYSICAL THERAPY | Age: 38
Setting detail: RECURRING SERIES
Discharge: HOME | End: 2023-04-07
Payer: COMMERCIAL

## 2023-04-04 PROCEDURE — 97110 THERAPEUTIC EXERCISES: CPT

## 2023-04-04 ASSESSMENT — PAIN SCALES - GENERAL: PAINLEVEL_OUTOF10: 3

## 2023-04-04 NOTE — THERAPY RECERTIFICATION
extensive debridement of a type 2 SLAP tear, biceps labral complex, glenohumeral joint, subacromial space, mini-open rotator cuff repair for a 3 cm full-thickness subscapularis and infraspinatus rotator cuff tear, and biceps tenodesis on 6/17/22. She continues to make steady progress with functional strengthening. She continues to have pain to the shoulder, especially with increased active use. She has excellent shoulder ROM. Strength is good with manual testing, but there is functional weakness to the shoulder and whole UE. Weakness and pain levels are her primary impairments. She is progressing toward her discharge goals. She will benefit from continued PT for the post op shoulder rehab for continued progressive strengthening, then prep for discharge to an advanced [facility]. Problem List: (Impacting functional limitations): Body Structures, Functions, Activity Limitations Requiring Skilled Therapeutic Intervention: Decreased strength; Increased pain        Therapy Prognosis:   Therapy Prognosis: Good       PLAN   Effective Dates: 2/16/23 TO Plan of Care/Certification Expiration Date: 05/04/23        Frequency/Duration: Plan Frequency: 2x/week for an additional 6 weeks    Interventions Planned (Treatment may consist of any combination of the following):    Current Treatment Recommendations: ROM; Manual Therapy - Soft Tissue Mobilization; Manual Therapy - Joint Manipulation; Pain management; Home exercise program; Modalities; progressing to Strengthening when appropriate. Goals: (Goals have been discussed and agreed upon with patient.)  Short-Term Functional Goals:   Report no more than 5/10 intermittent pain to R shoulder with compensatory use during basic functional activities, and score less than 50% on the DASH. Met 9/29/22  R shoulder PROM forward elevation greater than 135 degrees and external rotation greater than 60 degrees to progress into functional ranges.  Met 9/2/22  Demonstrate good R shoulder

## 2023-04-04 NOTE — PROGRESS NOTES
elvation  Red tubing 2x10 Standing   ER/abd/forward elevation combo  Red tubig 2x10;    IR/add/forward elvation  Red tubing 2x10   Diagonal Ext    - D1 and D2  3# cable 1x15 ea D1 and D2 3# cables 1x15 each -    Diagonal Flex    - -  -    Thread the needle    - -      CKC UE  Quadruped respiratory bellt lift w/ BUE 1x5    Child's pose to cobra pose x10    Child's pose to downward dog x10    Bird dog 2x10 Quadruped respiratory bellt lift w/ BUE 1x5    Child's pose to cobra pose x10    Child's pose to downward dog x10    Bird dog 2x10 - - Quadruped respiratory bellt lift w/ BUE 1x5    Child's pose to cobra pose x10    Child's pose to downward dog x10    Bird dog 2x10 Quadruped respiratory bellt lift w/ BUE 1x5    Child's pose to cobra pose x10    Child's pose to downward dog x10    Bird dog 2x10 Quadruped respiratory bellt lift w/ BUE 1x5    Child's pose to cobra pose x10    Child's pose to downward dog x10    Bird dog 2x10   Rhomboid stretch    - -  -    Thoracic extension    - -  -    Biceps 3# 5x until elbow pain flared   - -  -    Triceps    - -  -      HEP: No new HEP. [employer] Portal  7/5/22: [id number]  10/13/22: [id number] Initial scapulo-cuff strrength    Treatment/Session Summary:    Treatment Assessment: Pt displayed good form with exercises today. No reports of increased pain, just increased muscle fatigue. Communication/Consultation:  None today  Equipment provided today:  None  Recommendations/Intent for next treatment session: We will continue to progress R shoulder and whole upper quarter re-strengthening as ordered.      Total Treatment Billable Duration: 40 minutes therex  Time In: 8931  Time Out: [address], PT       Charge Capture Post Session Pain  PT Visit Info  [address] Portal  MD Guidelines  Scanned Media  Benefits  MyChart    Future Appointments   Date Time Provider Nader Jameson   4/6/2023  3:15 PM Naseem Costa, SHANN[facility]   4/10/2023  3:15 PM Earl Grant,

## 2023-04-13 ENCOUNTER — HOSPITAL ENCOUNTER (OUTPATIENT)
Dept: PHYSICAL THERAPY | Age: 38
Setting detail: RECURRING SERIES
Discharge: HOME | End: 2023-04-16
Payer: COMMERCIAL

## 2023-04-13 PROCEDURE — 97110 THERAPEUTIC EXERCISES: CPT

## 2023-04-17 ENCOUNTER — HOSPITAL ENCOUNTER (OUTPATIENT)
Dept: PHYSICAL THERAPY | Age: 38
Setting detail: RECURRING SERIES
End: 2023-04-17
Payer: COMMERCIAL

## 2023-04-20 ENCOUNTER — HOSPITAL ENCOUNTER (OUTPATIENT)
Dept: PHYSICAL THERAPY | Age: 38
Setting detail: RECURRING SERIES
End: 2023-04-20
Payer: COMMERCIAL

## 2023-04-24 ENCOUNTER — HOSPITAL ENCOUNTER (OUTPATIENT)
Dept: PHYSICAL THERAPY | Age: 38
Setting detail: RECURRING SERIES
Discharge: HOME | End: 2023-04-27
Payer: COMMERCIAL

## 2023-04-24 PROCEDURE — 97110 THERAPEUTIC EXERCISES: CPT

## 2023-04-24 NOTE — PROGRESS NOTES
Pily Lewis  : 1985  Primary: Jono Pena Sc  Secondary:  [facility]ILL[facility]  [address]  [address] [facility] [address]  Phone: [phone]  Fax: [phone] Plan Frequency: 2x/week for 6 additional weeks    Plan of Care/Certification Expiration Date: 23      PT Visit Info: Total # of Visits to Date: 76  Progress Note Counter: 3  No Show: 1     Visit Count:  74   OUTPATIENT PHYSICAL THERAPY:OP NOTE TYPE: Treatment Note 2023       Episode  Appt Desk           Treatment Diagnosis:  Pain in Right Shoulder (M25.511)  Stiffness of Right Shoulder, Not elsewhere classified (M25.611)   Strain of muscle(s) and tendon(s) of the rotator cuff of right shoulder, sequela (S46.011S)  Medical/Referring Diagnosis: s/p  Arthroscopy of right shoulder, arthroscopic subacromial decompression, arthroscopic distal clavicle resection, extensive debridement of SLAP tear, biceps labral complex, glenohumeral joint, subacromial space, mini-open rotator cuff repair and biceps tenodesis. Strain of muscle(s) and tendon(s) of the rotator cuff of right shoulder, initial encounter [S46.011A]  Referring Physician:  Gay Munoz MD MD Orders:  PT eval & treat, home exercise program and Full Motion, Full Strength; 2x/week x6 weeks. (23)   Return MD Appt: 23  Date of Onset:  Onset Date: 22 (surgery; 22 injury)    Allergies: Duloxetine and Erythromycin  Restrictions/Precautions: Post op restrictions and precautions R shoulder. Interventions Planned (Treatment may consist of any combination of the following):    Current Treatment Recommendations: Strengthening; Home exercise program; Modalities; Dry needling     Subjective Comments: Shoulder has been doing much better over the past 6 weeks. Has been doing a lot of work using her arms. Pain is at 3-4/10. Initial:      No VAS  Post Session:         No VAS.    Medications Last Reviewed: Naproxen; gabapen 2023  Updated Objective Findings:

## 2023-04-25 ENCOUNTER — OFFICE VISIT (OUTPATIENT)
Dept: ORTHOPEDIC SURGERY | Age: 38
End: 2023-04-25
Payer: COMMERCIAL

## 2023-04-25 DIAGNOSIS — Z47.89 ORTHOPEDIC AFTERCARE: Primary | ICD-10-CM

## 2023-04-25 PROCEDURE — 99212 OFFICE O/P EST SF 10 MIN: CPT | Performed by: ORTHOPAEDIC SURGERY

## 2023-04-25 NOTE — PROGRESS NOTES
Name: Clay Abdi  YOB: 1985  Gender: female  MRN: [id number]          HPI: Clay Abdi is a 45 y.o. right-hand-dominant female 10 months status post arthroscopy right shoulder ASD, ADCR, extensive debridement SLAP tear, biceps labral complex, glenohumeral joint, subacromial space, mini open rotator cuff repair and biceps tenodesis. Operative findings were notable for a 3 cm rotator cuff tear. She returns noting she is doing fantastic. Minimal discomfort and weakness. Excellent motion. The last session of physical therapy with Johnny Castillo really helped    ROS/Meds/PSH/PMH/FH/SH: A ten system review of systems was performed and is negative other than what is in the HPI. Tobacco:  reports that she quit smoking about 18 months ago. Her smoking use included cigarettes. She has a 10.00 pack-year smoking history. She has never used smokeless tobacco.  There were no vitals taken for this visit. Physical Examination:  She is an awake alert pleasant female ambulating without difficulty  She has restricted range of cervical spine motion with bilateral trapezial tenderness right greater than left    The left shoulder has 0 to 180 degrees of active and 0 to 180 degrees passive forward elevation. Pain at extremes of motion and in the overhead position  Internal rotation is to T6. External rotation is to 60 degrees at the side. In the 90 degree abducted position 90 degrees of external and 90 degrees internal rotation  The AC joint is tender  SC joint is non-tender. Greater tuberosity is non-tender. negative biceps  Negative O'Briens sign  negative lift-off sign  Negative belly press sign  Negative bear huggers sign  negative drop sign  negative hornblower's sign  No posterior glenohumeral joint line tenderness.    No evident excessive external rotation  Rotator cuff strength is 5/5.  negative external rotation stress test.   Negative empty can sign  There is no evident anterior or posterior
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
## 2023-04-27 ENCOUNTER — HOSPITAL ENCOUNTER (OUTPATIENT)
Dept: PHYSICAL THERAPY | Age: 38
Setting detail: RECURRING SERIES
Discharge: HOME | End: 2023-04-30
Payer: COMMERCIAL

## 2023-04-27 PROCEDURE — 97110 THERAPEUTIC EXERCISES: CPT

## 2023-10-12 ENCOUNTER — HOSPITAL ENCOUNTER (OUTPATIENT)
Dept: PHYSICAL THERAPY | Age: 38
Setting detail: RECURRING SERIES
Discharge: HOME | End: 2023-10-15
Payer: COMMERCIAL

## 2023-10-12 DIAGNOSIS — M25.612 STIFFNESS OF LEFT SHOULDER, NOT ELSEWHERE CLASSIFIED: ICD-10-CM

## 2023-10-12 DIAGNOSIS — M25.511 RIGHT SHOULDER PAIN, UNSPECIFIED CHRONICITY: ICD-10-CM

## 2023-10-12 DIAGNOSIS — M25.512 LEFT SHOULDER PAIN, UNSPECIFIED CHRONICITY: Primary | ICD-10-CM

## 2023-10-12 DIAGNOSIS — M54.2 NECK PAIN: ICD-10-CM

## 2023-10-12 DIAGNOSIS — S00.83XS FACIAL CONTUSION, SEQUELA: ICD-10-CM

## 2023-10-12 PROCEDURE — 97162 PT EVAL MOD COMPLEX 30 MIN: CPT

## 2023-10-12 ASSESSMENT — PAIN SCALES - GENERAL: PAINLEVEL_OUTOF10: 8
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
## 2023-11-02 NOTE — PROGRESS NOTES
Detail Level: Detailed Kayr Villarreal  : 1985  Primary: Allyssa Going Sc  Secondary:  [address] [address]  Phone: [phone]  Fax: [phone] Plan Frequency: 2x/week (x2 weeks initially with plan to follow 12 weeks)    Plan of Care/Certification Expiration Date: 22      PT Visit Info: Total # of Visits to Date: 21     Visit Count:  21   OUTPATIENT PHYSICAL THERAPY:OP NOTE TYPE: Treatment Note 2022       Episode  Appt Desk             Treatment Diagnosis:  Pain in Right Shoulder (M25.511)  Stiffness of Right Shoulder, Not elsewhere classified (M25.611)   Strain of muscle(s) and tendon(s) of the rotator cuff of right shoulder, sequela (S46.011S)  Medical/Referring Diagnosis: s/p  Arthroscopy of right shoulder, arthroscopic subacromial decompression, arthroscopic distal clavicle resection, extensive debridement of SLAP tear, biceps labral complex, glenohumeral joint, subacromial space, mini-open rotator cuff repair and biceps tenodesis. Strain of muscle(s) and tendon(s) of the rotator cuff of right shoulder, initial encounter [S46.011A]  Referring Physician:  Kaleb Harvey MD MD Orders:   eval & treat, home exercise program and Full Motion, Full Strength, Aggressive; 2x/week x6 weeks. (22)   Date of Onset:  Onset Date: 22 (surgery; 22 injury)  Allergies: Duloxetine and Erythromycin  Restrictions/Precautions: Post op restrictions and precautions R shoulder. Interventions Planned (Treatment may consist of any combination of the following):    Current Treatment Recommendations: Strengthening; ROM; Manual Therapy - Soft Tissue Mobilization; Manual Therapy - Joint Manipulation; Pain management; Home exercise program; Modalities     Subjective Comments: Pt reports R shoulder soreness.   Initial: No VAS    (mild)  Post Session:        (mild pain)  Medications Last Reviewed:  2022  Updated Objective Findings:    ROM:     Strength:   L Quality 402: Tobacco Use And Help With Quitting Among Adolescents: Patient screened for tobacco and never smoked Shoulder: strong with pain to abd, flex, ER>IR, strong and painless to remainder with Soft Tissue Differential testing; 3- against gravity to flex, abd, ER. Treatment   Active warm up on UBE level 1 x6', then pulleys 3 ways x15-20 each, and standing wand ext/add/IR. THERAPEUTIC EXERCISE: (45 minutes): Exercises for active motion with standing with back against wall bilateral ER in shoulder neutral, ER and IR supported in abduction (close to 90 deg as possible), facing wall bilat ER at 90 deg flexion, facing wall bilat forearm flexion wall slide, and R only scaption full arc wall slide, 3\"x10 each with emphasis on stable spine/scapula with mobile shoulder and disassociation of movement at shoulder. In supine, Assisted Active Isolated Stretches to ER at 30, 45, and 80 degrees abduction, IR stabilized at 45 and 80 degrees abduction, ER and IR at 90 degrees flexion, abduction, and forward elevation, 3\"x10 each. Exercises for shoulder girdle muscle activation, initial strength with side-lying guided UE lift off 2x10 each to middle trap and lower trap positions; side-lying ER AROM with manual stab of scapula, 1x10, then x10 with 1#. Side lying middle trap with 1# x5. MODALITIES: (15 minutes, un-billable): Cold and compression for post treatment soreness to R shoulder using [employer], low pressure, 40-deg F, x15' while in sitting UE supported. Good relief with this. HEP: She is to continue with her existing HEP. She verbalizes understanding. [employer] Portal  7/5/22: [id number]    Treatment/Session Summary:    Treatment Assessment: Pt did not report increased pain. She is weak with over head motions but her PROM is improving. Communication/Consultation:   None  Equipment provided today:  None  Recommendations/Intent for next treatment session: We will continue to progress R shoulder and re-strengthening as pain allows. Continue with modalities as needed. Update HEP as appropriate.    Total Treatment Billable Duration: 45 minutes + 15 min unbilled  Time In: 6752  Time Out: 1620    Bernabe Glory, PTA       Charge Capture Post Session Pain  PT Visit Info  [employer] Portal  MD Guidelines  Scanned Media  Benefits  MyChart    Future Appointments   Date Time Provider Nader Jameson   9/9/2022 10:30 AM Fredrick Shove, PT [facility]   9/13/2022  1:45 PM Bernabe Glory, PTA [facility]   9/15/2022  1:45 PM Fredrick Shove, PT [facility]   9/20/2022  1:45 PM Bernabe Glory, PTA [facility]   9/22/2022  3:15 PM Fredrick Shove, PT [facility]   9/27/2022  1:45 PM Bernabe Glory, PTA [facility]   9/29/2022  2:30 PM Fredrick Shove, PT [facility]   10/4/2022  1:45 PM Bernabe Glory, PTA [facility]   10/6/2022  2:30 PM Fredrick Shove, PT [facility]   10/11/2022  1:45 PM Bernabe Glory, PTA [facility]   10/11/2022  2:45 PM Bart Patel MD [facility]   10/13/2022  2:30 PM Fredrick Shove, PT [facility] Quality 130: Documentation Of Current Medications In The Medical Record: Current Medications Documented Quality 110: Preventive Care And Screening: Influenza Immunization: Influenza Immunization Administered during Influenza season Quality 431: Preventive Care And Screening: Unhealthy Alcohol Use - Screening: Patient not identified as an unhealthy alcohol user when screened for unhealthy alcohol use using a systematic screening method Quality 226: Preventive Care And Screening: Tobacco Use: Screening And Cessation Intervention: Patient screened for tobacco use and is an ex/non-smoker

## 2023-11-13 ENCOUNTER — OFFICE VISIT (OUTPATIENT)
Dept: ORTHOPEDIC SURGERY | Age: 38
End: 2023-11-13

## 2023-11-13 DIAGNOSIS — M19.012 DEGENERATIVE JOINT DISEASE OF LEFT ACROMIOCLAVICULAR JOINT: ICD-10-CM

## 2023-11-13 DIAGNOSIS — S43.401A SPRAIN OF RIGHT SHOULDER, UNSPECIFIED SHOULDER SPRAIN TYPE, INITIAL ENCOUNTER: ICD-10-CM

## 2023-11-13 DIAGNOSIS — S43.402A SPRAIN OF LEFT SHOULDER, UNSPECIFIED SHOULDER SPRAIN TYPE, INITIAL ENCOUNTER: Primary | ICD-10-CM
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
## 2023-11-15 DIAGNOSIS — S43.402A SPRAIN OF LEFT SHOULDER, UNSPECIFIED SHOULDER SPRAIN TYPE, INITIAL ENCOUNTER: Primary | ICD-10-CM

## 2024-01-08 ENCOUNTER — TELEPHONE (OUTPATIENT)
Dept: ORTHOPEDIC SURGERY | Age: 39
End: 2024-01-08

## 2024-01-22 ENCOUNTER — OFFICE VISIT (OUTPATIENT)
Dept: ORTHOPEDIC SURGERY | Age: 39
End: 2024-01-22
Payer: COMMERCIAL

## 2024-01-22 DIAGNOSIS — S46.012A STRAIN OF TENDON OF LEFT ROTATOR CUFF, INITIAL ENCOUNTER: Primary | ICD-10-CM

## 2024-01-22 DIAGNOSIS — S46.112A STRAIN OF LONG HEAD OF BICEPS, LEFT, INITIAL ENCOUNTER: ICD-10-CM

## 2024-01-22 DIAGNOSIS — M19.012 DEGENERATIVE JOINT DISEASE OF LEFT ACROMIOCLAVICULAR JOINT: ICD-10-CM

## 2024-01-22 DIAGNOSIS — S43.402A SPRAIN OF LEFT SHOULDER, UNSPECIFIED SHOULDER SPRAIN TYPE, INITIAL ENCOUNTER: ICD-10-CM

## 2024-01-22 PROBLEM — S46.119A STRAIN OF LONG HEAD OF BICEPS: Status: ACTIVE | Noted: 2024-01-22

## 2024-01-22 PROCEDURE — 99214 OFFICE O/P EST MOD 30 MIN: CPT | Performed by: ORTHOPAEDIC SURGERY
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
## 2024-02-08 ENCOUNTER — TELEPHONE (OUTPATIENT)
Dept: ORTHOPEDIC SURGERY | Age: 39
End: 2024-02-08

## (undated) DEVICE — SUTURE N ABSRB BRAIDED 2-0 MO-6 39 IN 26 MM 1/2 CIR BLU BLK 3910900023

## (undated) DEVICE — PACK,SHOULDER,DRAPE,POUCH: Brand: MEDLINE

## (undated) DEVICE — SHOULDER SUSPENSION KIT 6 PER BOX

## (undated) DEVICE — SOLUTION IRRIG 3000ML 0.9% SOD CHL USP UROMATIC PLAS CONT

## (undated) DEVICE — SLING ARM AD ULT

## (undated) DEVICE — APPLICATOR MEDICATED 26 CC SOLUTION HI LT ORNG CHLORAPREP

## (undated) DEVICE — SHOULDER ARTHRO DR POSTA: Brand: MEDLINE INDUSTRIES, INC.

## (undated) DEVICE — Device

## (undated) DEVICE — SUTURE ABSORBABLE BRAIDED 0 CP-1 27 IN COAT UD VICRYL + VCP267H

## (undated) DEVICE — Z INACTIVE USE 2855128 SPONGE GZ 16 PLY WVN COT 4INX4IN  HHH

## (undated) DEVICE — SUTURE PROL SZ 2-0 L18IN NONABSORBABLE BLU FS L26MM 3/8 CIR 8685H

## (undated) DEVICE — PAD,ABDOMINAL,5"X9",ST,LF,25/BX: Brand: MEDLINE INDUSTRIES, INC.